# Patient Record
Sex: MALE | Race: BLACK OR AFRICAN AMERICAN | NOT HISPANIC OR LATINO | Employment: OTHER | URBAN - METROPOLITAN AREA
[De-identification: names, ages, dates, MRNs, and addresses within clinical notes are randomized per-mention and may not be internally consistent; named-entity substitution may affect disease eponyms.]

---

## 2017-03-27 RX ORDER — MELOXICAM 15 MG/1
TABLET ORAL
Qty: 90 TABLET | Refills: 0 | Status: SHIPPED | OUTPATIENT
Start: 2017-03-27 | End: 2017-06-25 | Stop reason: SDUPTHER

## 2017-06-25 RX ORDER — MELOXICAM 15 MG/1
TABLET ORAL
Qty: 90 TABLET | Refills: 0 | Status: SHIPPED | OUTPATIENT
Start: 2017-06-25 | End: 2017-09-07 | Stop reason: SDUPTHER

## 2017-09-07 RX ORDER — MELOXICAM 15 MG/1
TABLET ORAL
Qty: 90 TABLET | Refills: 0 | Status: SHIPPED | OUTPATIENT
Start: 2017-09-07 | End: 2017-11-17 | Stop reason: SDUPTHER

## 2017-09-07 NOTE — TELEPHONE ENCOUNTER
Please notify the patient that this will be the last refill until being seen in my office. Patient should call ASAP to make an appointement. Thank you.

## 2017-09-20 DIAGNOSIS — I10 UNSPECIFIED ESSENTIAL HYPERTENSION: ICD-10-CM

## 2017-09-20 RX ORDER — LISINOPRIL AND HYDROCHLOROTHIAZIDE 10; 12.5 MG/1; MG/1
TABLET ORAL
Qty: 90 TABLET | Refills: 0 | Status: SHIPPED | OUTPATIENT
Start: 2017-09-20 | End: 2018-01-04 | Stop reason: SDUPTHER

## 2017-10-30 ENCOUNTER — OFFICE VISIT (OUTPATIENT)
Dept: INTERNAL MEDICINE | Facility: CLINIC | Age: 67
End: 2017-10-30
Attending: INTERNAL MEDICINE
Payer: MEDICARE

## 2017-10-30 VITALS
OXYGEN SATURATION: 99 % | WEIGHT: 201 LBS | BODY MASS INDEX: 28.14 KG/M2 | SYSTOLIC BLOOD PRESSURE: 118 MMHG | DIASTOLIC BLOOD PRESSURE: 78 MMHG | HEART RATE: 57 BPM | HEIGHT: 71 IN

## 2017-10-30 DIAGNOSIS — Z13.31 SCREENING FOR DEPRESSION: ICD-10-CM

## 2017-10-30 DIAGNOSIS — G89.29 CHRONIC PAIN OF RIGHT ANKLE: ICD-10-CM

## 2017-10-30 DIAGNOSIS — Z13.39 SCREENING FOR ALCOHOLISM: ICD-10-CM

## 2017-10-30 DIAGNOSIS — Z00.00 ROUTINE HISTORY AND PHYSICAL EXAMINATION OF ADULT: Primary | ICD-10-CM

## 2017-10-30 DIAGNOSIS — R42 DIZZY SPELLS: ICD-10-CM

## 2017-10-30 DIAGNOSIS — Z23 NEED FOR VACCINATION AGAINST STREPTOCOCCUS PNEUMONIAE USING PNEUMOCOCCAL CONJUGATE VACCINE 7: ICD-10-CM

## 2017-10-30 DIAGNOSIS — Z23 INFLUENZA VACCINATION ADMINISTERED AT CURRENT VISIT: Primary | ICD-10-CM

## 2017-10-30 DIAGNOSIS — I10 ESSENTIAL HYPERTENSION: ICD-10-CM

## 2017-10-30 DIAGNOSIS — M25.571 CHRONIC PAIN OF RIGHT ANKLE: ICD-10-CM

## 2017-10-30 PROCEDURE — G0008 ADMIN INFLUENZA VIRUS VAC: HCPCS | Mod: S$GLB,,, | Performed by: INTERNAL MEDICINE

## 2017-10-30 PROCEDURE — 99214 OFFICE O/P EST MOD 30 MIN: CPT | Mod: 25,S$GLB,, | Performed by: INTERNAL MEDICINE

## 2017-10-30 PROCEDURE — G0009 ADMIN PNEUMOCOCCAL VACCINE: HCPCS | Mod: S$GLB,,, | Performed by: INTERNAL MEDICINE

## 2017-10-30 PROCEDURE — G0442 ANNUAL ALCOHOL SCREEN 15 MIN: HCPCS | Mod: 59,S$GLB,, | Performed by: INTERNAL MEDICINE

## 2017-10-30 PROCEDURE — 90686 IIV4 VACC NO PRSV 0.5 ML IM: CPT | Mod: S$GLB,,, | Performed by: INTERNAL MEDICINE

## 2017-10-30 PROCEDURE — G0444 DEPRESSION SCREEN ANNUAL: HCPCS | Mod: 59,S$GLB,, | Performed by: INTERNAL MEDICINE

## 2017-10-30 PROCEDURE — 90670 PCV13 VACCINE IM: CPT | Mod: S$GLB,,, | Performed by: INTERNAL MEDICINE

## 2017-10-30 NOTE — PROGRESS NOTES
Subjective:       Patient ID: Jerman Miguel is a 67 y.o. male.    Chief Complaint: Follow-up; Ankle Pain (right / cannot put weight on it / problems walking); and Dizziness    Right ankle hurts with weight bearing.  2 episodes of dizziness when 1st standing in the morning.      Ankle Pain    The incident occurred more than 1 week ago.   Dizziness:   Chronicity:  New  Onset:  1 to 4 weeks ago  Progression since onset:  Resolved    Review of Systems   Constitutional: Negative.    Respiratory: Negative.    Cardiovascular: Negative.    Musculoskeletal: Positive for arthralgias.   Neurological: Positive for dizziness.       Objective:      Physical Exam   Constitutional: He appears well-developed and well-nourished.   HENT:   Head: Normocephalic and atraumatic.   Eyes: Pupils are equal, round, and reactive to light.   Cardiovascular: Normal rate, regular rhythm and normal heart sounds.    Pulmonary/Chest: Effort normal.   Musculoskeletal: He exhibits no edema.        Right ankle: He exhibits decreased range of motion.   Neurological: He is alert.       Assessment:       1. Influenza vaccination administered at current visit    2. Need for vaccination against Streptococcus pneumoniae using pneumococcal conjugate vaccine 7    3. Chronic pain of right ankle    4. Dizzy spells        Plan:       Per orders and D/C instructions.  Continue meds/diet for HTN, which is stable.  Stay hydrated to prevent dizziness. Consider stopping HCTZ.  See Dr. Veliz for arthritis of right ankle.    Screening: The patient was screened for depression with the PHQ2 questionnaire and possible health consequences were discussed with the patient, who understands (15 minutes spent). The patient was screened for the misuse of alcohol, by asking the number of drinks per average week, and if pt has had more than 4 drinks (more than 3 for women and elderly) in 1 day within the past year. The health and legal consequences of misuse were  discussed (15 minutes spent). The patient was screened for obesity (BMI>30), If the current BMI > 30, then the possible consequences of obesity, as well as the benefits of diet, exercise, and weight loss were discussed (15 minutes spent).

## 2017-11-02 PROBLEM — E78.00 HIGH CHOLESTEROL: Status: ACTIVE | Noted: 2017-11-02

## 2017-11-02 LAB
25(OH)D3+25(OH)D2 SERPL-MCNC: 18 NG/ML (ref 30–100)
ALBUMIN SERPL-MCNC: 4.6 G/DL (ref 3.6–5.1)
ALBUMIN/GLOB SERPL: 1.8 (CALC) (ref 1–2.5)
ALP SERPL-CCNC: 55 U/L (ref 40–115)
ALT SERPL-CCNC: 13 U/L (ref 9–46)
AST SERPL-CCNC: 17 U/L (ref 10–35)
BASOPHILS # BLD AUTO: 71 CELLS/UL (ref 0–200)
BASOPHILS NFR BLD AUTO: 1 %
BILIRUB SERPL-MCNC: 0.7 MG/DL (ref 0.2–1.2)
BUN SERPL-MCNC: 17 MG/DL (ref 7–25)
BUN/CREAT SERPL: NORMAL (CALC) (ref 6–22)
CALCIUM SERPL-MCNC: 9.6 MG/DL (ref 8.6–10.3)
CHLORIDE SERPL-SCNC: 103 MMOL/L (ref 98–110)
CHOLEST SERPL-MCNC: 231 MG/DL
CHOLEST/HDLC SERPL: 5.1 (CALC)
CO2 SERPL-SCNC: 27 MMOL/L (ref 20–31)
CREAT SERPL-MCNC: 1.04 MG/DL (ref 0.7–1.25)
EOSINOPHIL # BLD AUTO: 121 CELLS/UL (ref 15–500)
EOSINOPHIL NFR BLD AUTO: 1.7 %
ERYTHROCYTE [DISTWIDTH] IN BLOOD BY AUTOMATED COUNT: 12.8 % (ref 11–15)
GFR SERPL CREATININE-BSD FRML MDRD: 73 ML/MIN/1.73M2
GLOBULIN SER CALC-MCNC: 2.6 G/DL (CALC) (ref 1.9–3.7)
GLUCOSE SERPL-MCNC: 83 MG/DL (ref 65–99)
HBA1C MFR BLD: 5.2 % OF TOTAL HGB
HCT VFR BLD AUTO: 42.8 % (ref 38.5–50)
HDLC SERPL-MCNC: 45 MG/DL
HGB BLD-MCNC: 14.1 G/DL (ref 13.2–17.1)
LDLC SERPL CALC-MCNC: 166 MG/DL (CALC)
LYMPHOCYTES # BLD AUTO: 1598 CELLS/UL (ref 850–3900)
LYMPHOCYTES NFR BLD AUTO: 22.5 %
MCH RBC QN AUTO: 29.1 PG (ref 27–33)
MCHC RBC AUTO-ENTMCNC: 32.9 G/DL (ref 32–36)
MCV RBC AUTO: 88.2 FL (ref 80–100)
MONOCYTES # BLD AUTO: 582 CELLS/UL (ref 200–950)
MONOCYTES NFR BLD AUTO: 8.2 %
NEUTROPHILS # BLD AUTO: 4729 CELLS/UL (ref 1500–7800)
NEUTROPHILS NFR BLD AUTO: 66.6 %
NONHDLC SERPL-MCNC: 186 MG/DL (CALC)
PLATELET # BLD AUTO: 196 THOUSAND/UL (ref 140–400)
PMV BLD REES-ECKER: 12.5 FL (ref 7.5–12.5)
POTASSIUM SERPL-SCNC: 4.7 MMOL/L (ref 3.5–5.3)
PROT SERPL-MCNC: 7.2 G/DL (ref 6.1–8.1)
PSA SERPL-MCNC: 0.8 NG/ML
RBC # BLD AUTO: 4.85 MILLION/UL (ref 4.2–5.8)
SODIUM SERPL-SCNC: 139 MMOL/L (ref 135–146)
TRIGL SERPL-MCNC: 91 MG/DL
TSH SERPL-ACNC: 0.66 MIU/L (ref 0.4–4.5)
VIT B12 SERPL-MCNC: 526 PG/ML (ref 200–1100)
VITAMIN D2 SERPL-MCNC: <4 NG/ML
VITAMIN D3 SERPL-MCNC: 18 NG/ML
WBC # BLD AUTO: 7.1 THOUSAND/UL (ref 3.8–10.8)

## 2017-11-18 RX ORDER — MELOXICAM 15 MG/1
TABLET ORAL
Qty: 90 TABLET | Refills: 1 | Status: SHIPPED | OUTPATIENT
Start: 2017-11-18 | End: 2018-05-15 | Stop reason: SDUPTHER

## 2018-01-04 DIAGNOSIS — I10 ESSENTIAL HYPERTENSION: ICD-10-CM

## 2018-01-04 RX ORDER — LISINOPRIL AND HYDROCHLOROTHIAZIDE 10; 12.5 MG/1; MG/1
TABLET ORAL
Qty: 90 TABLET | Refills: 3 | Status: SHIPPED | OUTPATIENT
Start: 2018-01-04 | End: 2018-12-27 | Stop reason: SDUPTHER

## 2018-01-11 ENCOUNTER — HOSPITAL ENCOUNTER (OUTPATIENT)
Dept: RADIOLOGY | Facility: OTHER | Age: 68
Discharge: HOME | End: 2018-01-11
Attending: INTERNAL MEDICINE
Payer: MEDICARE

## 2018-01-11 ENCOUNTER — OFFICE VISIT (OUTPATIENT)
Dept: INTERNAL MEDICINE | Facility: CLINIC | Age: 68
End: 2018-01-11
Attending: INTERNAL MEDICINE
Payer: MEDICARE

## 2018-01-11 VITALS
SYSTOLIC BLOOD PRESSURE: 112 MMHG | HEART RATE: 64 BPM | HEIGHT: 71 IN | WEIGHT: 200 LBS | OXYGEN SATURATION: 98 % | BODY MASS INDEX: 28 KG/M2 | DIASTOLIC BLOOD PRESSURE: 74 MMHG

## 2018-01-11 DIAGNOSIS — G89.29 CHRONIC PAIN OF RIGHT ANKLE: ICD-10-CM

## 2018-01-11 DIAGNOSIS — E78.00 HIGH CHOLESTEROL: ICD-10-CM

## 2018-01-11 DIAGNOSIS — I10 ESSENTIAL HYPERTENSION: Primary | ICD-10-CM

## 2018-01-11 DIAGNOSIS — M25.561 ACUTE PAIN OF RIGHT KNEE: ICD-10-CM

## 2018-01-11 DIAGNOSIS — M25.571 CHRONIC PAIN OF RIGHT ANKLE: ICD-10-CM

## 2018-01-11 PROCEDURE — 73562 X-RAY EXAM OF KNEE 3: CPT | Mod: TC,FY,RT

## 2018-01-11 PROCEDURE — 99214 OFFICE O/P EST MOD 30 MIN: CPT | Mod: S$GLB,,, | Performed by: INTERNAL MEDICINE

## 2018-01-11 PROCEDURE — 73562 X-RAY EXAM OF KNEE 3: CPT | Mod: 26,RT,, | Performed by: RADIOLOGY

## 2018-01-11 NOTE — PROGRESS NOTES
Subjective:       Patient ID: Jerman Miguel is a 67 y.o. male.    Chief Complaint: Knee Pain (started swelling Monday)    Seeing Dr. Veliz for ankle pain. Getting inserts. May need ankle replacement 1 day.  Sudden onset right knee pain 3 days ago. No trauma. Hard to walk.      Knee Pain    The incident occurred 3 to 5 days ago. There was no injury mechanism. The pain has been constant since onset.   Hypertension   This is a chronic problem. The problem is controlled.     Review of Systems   Constitutional: Negative.    Respiratory: Negative.    Cardiovascular: Negative.    Musculoskeletal: Positive for arthralgias.   Psychiatric/Behavioral: Negative for dysphoric mood.       Objective:      Physical Exam   Constitutional: He appears well-developed and well-nourished.   HENT:   Head: Normocephalic and atraumatic.   Eyes: Pupils are equal, round, and reactive to light.   Cardiovascular: Normal rate, regular rhythm and normal heart sounds.    Pulmonary/Chest: Effort normal.   Musculoskeletal: He exhibits no edema.        Right knee: He exhibits swelling.        Right ankle: He exhibits decreased range of motion.   Neurological: He is alert.       Assessment:       1. Essential hypertension    2. High cholesterol    3. Acute pain of right knee    4. Chronic pain of right ankle        Plan:       Per orders and D/C instructions.  Continue meds/diet for ankle DJD, HTN, and high cholest. which are stable.     RICE and knee brace for knee pain. Ortho if not better in a few weeks. Check x-ray.    Screening: The patient was screened for depression with the PHQ2 questionnaire and possible health consequences were discussed with the patient, who understands (15 minutes spent). The patient was screened for the misuse of alcohol, by asking the number of drinks per average week, and if pt has had more than 4 drinks (more than 3 for women and elderly) in 1 day within the past year. The health and legal consequences of  misuse were discussed (15 minutes spent). The patient was screened for obesity (BMI>30), If the current BMI > 30, then the possible consequences of obesity, as well as the benefits of diet, exercise, and weight loss were discussed (15 minutes spent).

## 2018-04-13 ENCOUNTER — OFFICE VISIT (OUTPATIENT)
Dept: URGENT CARE | Facility: CLINIC | Age: 68
End: 2018-04-13
Payer: MEDICARE

## 2018-04-13 VITALS
SYSTOLIC BLOOD PRESSURE: 135 MMHG | HEART RATE: 108 BPM | WEIGHT: 200 LBS | HEIGHT: 71 IN | TEMPERATURE: 99 F | BODY MASS INDEX: 28 KG/M2 | DIASTOLIC BLOOD PRESSURE: 88 MMHG | RESPIRATION RATE: 16 BRPM | OXYGEN SATURATION: 95 %

## 2018-04-13 DIAGNOSIS — S99.921A FOOT INJURY, RIGHT, INITIAL ENCOUNTER: ICD-10-CM

## 2018-04-13 DIAGNOSIS — S93.324A LISFRANC DISLOCATION, RIGHT, INITIAL ENCOUNTER: ICD-10-CM

## 2018-04-13 DIAGNOSIS — S92.321A CLOSED DISPLACED FRACTURE OF SECOND METATARSAL BONE OF RIGHT FOOT, INITIAL ENCOUNTER: Primary | ICD-10-CM

## 2018-04-13 PROCEDURE — 29515 APPLICATION SHORT LEG SPLINT: CPT | Mod: RT,S$GLB,, | Performed by: EMERGENCY MEDICINE

## 2018-04-13 PROCEDURE — 3075F SYST BP GE 130 - 139MM HG: CPT | Mod: CPTII,S$GLB,, | Performed by: EMERGENCY MEDICINE

## 2018-04-13 PROCEDURE — 3079F DIAST BP 80-89 MM HG: CPT | Mod: CPTII,S$GLB,, | Performed by: EMERGENCY MEDICINE

## 2018-04-13 PROCEDURE — 99203 OFFICE O/P NEW LOW 30 MIN: CPT | Mod: 25,S$GLB,, | Performed by: EMERGENCY MEDICINE

## 2018-04-13 RX ORDER — HYDROCODONE BITARTRATE AND ACETAMINOPHEN 7.5; 325 MG/1; MG/1
1 TABLET ORAL EVERY 6 HOURS PRN
Qty: 20 TABLET | Refills: 0 | Status: SHIPPED | OUTPATIENT
Start: 2018-04-13 | End: 2018-04-18

## 2018-04-13 NOTE — PATIENT INSTRUCTIONS
Understanding Lisfranc Joint Injury  A Lisfranc joint injury is a kind of injury to the bones or ligaments in the arch of your foot. There is often also damage to the cartilage that covers these bones. The injury gets its name from a [place] surgeon.  Parts of the arch of the foot  In the middle region of your foot, a group of 10 small bones forms an arch. Five of these long bones (metatarsals) lead to your toes. The group also includes the cuboid bone and the medial, middle, and lateral cuneiform bones. Tight connective tissue bands hold all of these bones in place and give the joint its stability. This area of your foot is important to keep your arch steady. It also moves the force from your calves to the front of your feet when you walk.  What causes a Lisfranc joint injury?  A twisting fall may break one or more of these bones or move them out of place. This causes a Lisfranc injury. Its also called a tarsometatarsal joint injury. There are different types of Lisfranc injuries. They depend on the direction of the shifted metatarsals and how badly they are shifted.  A Lisfranc joint injury may be the result of twisting and falling on a foot that is pointing down. This is common in football and soccer players. Lisfranc injuries can also happen from a car accident.  What are the symptoms of a Lisfranc joint injury?  A Lisfranc joint injury can cause symptoms such as:  · Pain in your midfoot  · Pain to the touch  · Swelling or deformity in the middle part of your foot  · Inability to put weight on your foot  · Bruising in the middle of your foot  How is a Lisfranc joint injury diagnosed?  It is important to have a Lisfranc joint injury diagnosed correctly. It has many of the same symptoms as an ankle sprain. But the treatment for an ankle sprain is very different. Your healthcare provider will ask about your health history. He or she will ask questions about your symptoms and recent accidents. He or she will also  check your foot, looking for pain, deformity, bruising, and swelling. Your healthcare provider may hold your toes and move them up and down to see if this causes pain.  You will likely have X-rays. The X-rays will be done at special angles, so they can show the injury. A Lisfranc joint injury may not show up on standard X-rays.  You may also need other imaging tests of your foot. These tests may show a Lisfranc joint injury that an X-ray may not. You may have tests such as:  · MRI scan. This uses strong magnets and a computer to make images of the body. It gives more information about damage to the soft tissues in your foot.  · CT scan. This uses a series of X-rays and a computer to make detailed images. A special kind of imaging dye may be used. The scan gives information about damage to your bones.  Date Last Reviewed: 4/1/2017  © 5647-7671 [employer]. [address] [place] [address]. All rights reserved. This information is not intended as a substitute for professional medical care. Always follow your healthcare professional's instructions.        Foot Fracture  You have a broken bone (fracture) in your foot. This will cause pain, swelling, and often bruising. It will usually take about 4 to 8 weeks to heal. A foot fracture may be treated with a special shoe, splint, cast, or boot.  Home care  Follow these guidelines when caring for yourself at home:  · You may be given a splint, cast, shoe, or boot to keep the injured area from moving. Unless you were told otherwise, use crutches or a walker. Dont put weight on the injured foot until your health care provider says you can do so. (You can rent crutches and a walker at many pharmacies and surgical or orthopedic supply stores.) Dont put weight on a splint, or it will break.  · Keep your leg elevated to reduce pain and swelling. When sleeping, put a pillow under the injured leg. When sitting, support the injured leg so it is above your  waist. This is very important during the first 2 days (48 hours).  · Put an ice pack on the injured area. Do this for 20 minutes every 1 to 2 hours the first day for pain relief. You can make an ice pack by wrapping a plastic bag of ice cubes in a thin towel. As the ice melts, be careful that the splint, cast, boot, or shoe doesnt get wet. You can place the ice pack directly over the splint or cast. Unless told otherwise, you can open the boot or shoe to apply the ice pack. Continue using the ice pack 3 to 4 times a day for the next 2 days. Then use the ice pack as needed to ease pain and swelling.  · Keep the splint, cast, boot, or shoe dry. When bathing, protect it with a large plastic bag, rubber-banded at the top end. If a fiberglass splint or cast or boot gets wet, you can dry it with a hair dryer. Unless told otherwise, you can take off the boot or shoe to bathe.  · You may use acetaminophen or ibuprofen to control pain, unless another pain medicine was prescribed. If you have chronic liver or kidney disease, talk with your healthcare provider before using these medicines. Also talk with your provider if youve had a stomach ulcer or gastrointestinal bleeding.  · Dont put creams or objects under the cast if you have itching.  Follow-up care  Follow up with your healthcare provider, or as advised. This is to make sure the bone is healing the way it should. If you were given a splint, it may be changed to a cast or boot at your follow-up visit.  X-rays may be taken. You will be told of any new findings that may affect your care.  When to seek medical advice  Call your healthcare provider right away if any of these occur:  · The cast or splint cracks  · The plaster cast or splint becomes wet or soft  · The fiberglass cast or splint stays wet for more than 24 hours  · Bad odor from the cast or wound fluid stains the cast  · Tightness or pain under the cast or splint gets worse  · Toes become swollen, cold, blue,  numb, or tingly  · You cant move your toes  · Skin around cast or splint becomes red  · Fever of 100.4ºF (38ºC) or higher, or as directed by your healthcare provider  Date Last Reviewed: 2/1/2017 © 2000-2017 [employer]. [address] [place] [address]. All rights reserved. This information is not intended as a substitute for professional medical care. Always follow your healthcare professional's instructions.        Treatment for Lisfranc Joint Injury   A Lisfranc joint injury is a kind of injury to the bones or ligaments in the arch of your foot. There is often also damage to the cartilage that covers these bones. The injury gets its name from a [place] surgeon.  Types of treatment   Your treatment may vary depending on how severe of your injury is. Your treatment may include:  · Pain medicine  · Wearing a nonweight-bearing cast for 6 weeks  · Wearing a weight-bearing cast or a special foot support after the first 6 weeks  · Serial X-rays to see how your foot is healing  It is important not to put weight on your foot during the initial healing period.  In some cases, you may need surgery. You may need surgery if you:  · Have broken bones  · Your bones are not lined up correctly  · Your ligaments are completely torn  The types of surgery include:  · Open reduction and internal fixation (ORIF). This is the most common type of surgery for the injury. The bones are lined up correctly. Injured ligaments are repaired. Special metal plates and screws hold the bones in place. These may be removed at later date.  · Joint fusion. This type of surgery is only done if the damage is very severe and cant be repaired. This surgery fuses one or more of your bones together. They then heal into a single, solid piece.  After surgery, you need to use a cast for several weeks. You will not be able to put weight on your foot.  Possible complications of a Lisfranc joint injury  A Lisfranc joint injury can cause  arthritis in the injured bones of your foot. This can lead to chronic pain in the area. You are more likely to develop arthritis if you had a severe Lisfranc joint injury that damaged a lot of the cartilage. Arthritis may occur even if your surgery worked well. Some people need to have joint fusion surgery to relieve these symptoms if the arthritis is severe.  There is also a risk that your bones will not heal properly. This may require a follow-up surgery. These risks may be higher if you smoke or have thinned bones (osteoporosis).  When to call your healthcare provider  Call your healthcare provider right away if you have any of these:  · Fever of 100.4°F (38°C) or higher  · Pain that gets worse  · Numbness in your foot   Date Last Reviewed: 8/10/2015  © 0062-2031 [email or web address]. [address] [place] [address], [place] [address]. All rights reserved. This information is not intended as a substitute for professional medical care. Always follow your healthcare professional's instructions.      URGENT REFERRAL TO ORTHOPEDICS DONE FROM CLINIC AND THEY WILL CONTACT YOU FOR APPOINTMENT.    MOBIC OR MOTRIN FOR PAIN/INFLAMMATION  [facility]CO RX FOR SEVERE PAIN  ELEVATE FOOT AND LEG WHEN POSSIBLE  NON-WEIGHT BEARING AT ALL UNTIL SEEN AND FURTHER CASTING OR OTHER RECS GIVEN  CRUTCHES AND POSTERIOR SPLINT AND NONWEIGHTBEARING UNTIL ORTHOPEDICS/BONE AND JOINT DOCTOR APPOINTMENT WHICH IS VERY IMPORTANT WITH THIS TYPE OF INJURY.    RETURN FOR ANY CONCERNS OR PROBLEMS  GO TO THE ER IF FOOT/TOES CHANGING COLORS, GOING NUMB, OR PAIN SEVERE DESPITE MEDS.

## 2018-04-13 NOTE — PROGRESS NOTES
"Subjective:       Patient ID: Jerman Miguel is a 67 y.o. male.    Vitals:    04/13/18 1648   BP: 135/88   Pulse: 108   Resp: 16   Temp: 98.5 °F (36.9 °C)   SpO2: 95%   Weight: 90.7 kg (200 lb)   Height: 5' 11" (1.803 m)       Chief Complaint: Foot Pain    Pt states he fell off a ladder this afternoon. Pt estimates he fell about 7 feet landing on his right foot. Right foot is painful and swollen. HAVING SEVERE PAIN AND SWELLING TO THE RIGHT FOOT AND UNABLE TO BEAR WEIGHT./ PAIN LOCATED DORSAL MIDFOOT. NO ANKLE PAIN, NO KNEE PAIN. NO PRIOR FRACTURE/INJURY TO THAT FOOT PER PATIENT. HE WAS HELPING A FRIEND IN CONSTRUCTION.      Foot Pain   This is a new problem. The current episode started today. The problem occurs constantly. The problem has been unchanged. Associated symptoms include joint swelling. Pertinent negatives include no abdominal pain, chest pain, chills, fever, headaches, nausea, rash, sore throat or vomiting. The symptoms are aggravated by walking. He has tried ice for the symptoms.     Review of Systems   Constitution: Negative for chills and fever.   HENT: Negative for sore throat.    Eyes: Negative for blurred vision.   Cardiovascular: Negative for chest pain.   Respiratory: Negative for shortness of breath.    Skin: Negative for rash.   Musculoskeletal: Positive for joint pain and joint swelling. Negative for back pain.   Gastrointestinal: Negative for abdominal pain, diarrhea, nausea and vomiting.   Neurological: Negative for headaches.   Psychiatric/Behavioral: The patient is not nervous/anxious.        Objective:      Physical Exam   Constitutional: He is oriented to person, place, and time. He appears well-developed and well-nourished. He is cooperative.  Non-toxic appearance. He does not appear ill. No distress.   HENT:   Head: Normocephalic and atraumatic. Head is without abrasion, without contusion and without laceration.   Right Ear: Hearing, tympanic membrane, external ear and ear canal " normal. No hemotympanum.   Left Ear: Hearing, tympanic membrane, external ear and ear canal normal. No hemotympanum.   Nose: No mucosal edema, rhinorrhea or nasal deformity. No epistaxis. Right sinus exhibits no maxillary sinus tenderness and no frontal sinus tenderness. Left sinus exhibits no maxillary sinus tenderness and no frontal sinus tenderness.   Mouth/Throat: Uvula is midline and mucous membranes are normal. No trismus in the jaw. Normal dentition. No uvula swelling. No posterior oropharyngeal erythema.   Eyes: Conjunctivae, EOM and lids are normal. Pupils are equal, round, and reactive to light. Right eye exhibits no discharge. Left eye exhibits no discharge.   Sclera clear bilat   Neck: Trachea normal, normal range of motion, full passive range of motion without pain and phonation normal. Neck supple. No spinous process tenderness and no muscular tenderness present. No neck rigidity. No tracheal deviation present.   Cardiovascular: Normal rate, regular rhythm, normal heart sounds, intact distal pulses and normal pulses.    Pulmonary/Chest: Effort normal and breath sounds normal. No respiratory distress.   Abdominal: Soft. Normal appearance and bowel sounds are normal. He exhibits no pulsatile midline mass.   Musculoskeletal: He exhibits edema and tenderness (SEVERE TTP OF THE MIDFOOT DORSALLY BY THE MID/BASE OF THE 2/3 METATARSAL). He exhibits no deformity.   IS DISTALLY NV INTACT   Neurological: He is alert and oriented to person, place, and time. He has normal strength. No sensory deficit. He exhibits normal muscle tone. He displays no seizure activity. GCS eye subscore is 4. GCS verbal subscore is 5. GCS motor subscore is 6.   Skin: Skin is warm, dry and intact. Capillary refill takes less than 2 seconds. No abrasion, no bruising, no burn, no ecchymosis and no laceration noted. He is not diaphoretic. No pallor.   Psychiatric: He has a normal mood and affect. His speech is normal and behavior is normal.  "Cognition and memory are normal.   Nursing note and vitals reviewed.      X-ray Foot Complete Right    Result Date: 4/13/2018  EXAMINATION: XR FOOT COMPLETE 3 VIEW RIGHT CLINICAL HISTORY: . Unspecified injury of right foot, initial encounter TECHNIQUE: AP, lateral, and oblique views of the right foot were performed. COMPARISON: None FINDINGS: There is approximately 5 mm lateral displacement of the 2nd metatarsal consistent with Lisfranc injury, uncertain if this represents acute or chronic injury.  Otherwise no acute fracture or dislocation seen.  Prominent degenerative changes are visualized involving the hindfoot and ankle.  There is significant soft tissue swelling seen over the dorsum of the foot.  No radiopaque retained foreign body seen.     Acute versus chronic Lisfranc injury.  Correlate with mechanism of injury.  Orthopedic follow-up could be obtained. Electronically signed by: Leslye Washington MD Date:    04/13/2018 Time:    17:29        RIGHT POSTERIOR SPLINT PLACED SO NONWEIGHT BEARING FOLLOWED. NV INTACT PRIOR TO THE PROCEDURE AND NV INTACT AFTER PLACEMENT. FITS WELL. PLASTER AND CAST PADDING AND ACE WRAP USED. 5", 4", 4" RESPECTIVELY. NO COMPLICATION AND FITS/TOLERATED WELL. CRUTCHES ATTEMPTED AND SUCCESSFUL. HE WILL WEAR THIS UNTIL HE SEES ORTHOPEDICS.  Assessment:       1. Closed displaced fracture of second metatarsal bone of right foot, initial encounter    2. Foot injury, right, initial encounter    3. Lisfranc dislocation, right, initial encounter        Plan:       Jerman was seen today for foot pain.    Diagnoses and all orders for this visit:    Closed displaced fracture of second metatarsal bone of right foot, initial encounter  -     Ambulatory referral to Orthopedics    Foot injury, right, initial encounter  -     X-Ray Foot Complete Right; Future    Lisfranc dislocation, right, initial encounter  -     Ambulatory referral to Orthopedics    Other orders  -     hydrocodone-acetaminophen " 7.5-325mg (NORCO) 7.5-325 mg per tablet; Take 1 tablet by mouth every 6 (six) hours as needed for Pain.          Patient Instructions       Understanding Lisfranc Joint Injury  A Lisfranc joint injury is a kind of injury to the bones or ligaments in the arch of your foot. There is often also damage to the cartilage that covers these bones. The injury gets its name from a French surgeon.  Parts of the arch of the foot  In the middle region of your foot, a group of 10 small bones forms an arch. Five of these long bones (metatarsals) lead to your toes. The group also includes the cuboid bone and the medial, middle, and lateral cuneiform bones. Tight connective tissue bands hold all of these bones in place and give the joint its stability. This area of your foot is important to keep your arch steady. It also moves the force from your calves to the front of your feet when you walk.  What causes a Lisfranc joint injury?  A twisting fall may break one or more of these bones or move them out of place. This causes a Lisfranc injury. Its also called a tarsometatarsal joint injury. There are different types of Lisfranc injuries. They depend on the direction of the shifted metatarsals and how badly they are shifted.  A Lisfranc joint injury may be the result of twisting and falling on a foot that is pointing down. This is common in football and soccer players. Lisfranc injuries can also happen from a car accident.  What are the symptoms of a Lisfranc joint injury?  A Lisfranc joint injury can cause symptoms such as:  · Pain in your midfoot  · Pain to the touch  · Swelling or deformity in the middle part of your foot  · Inability to put weight on your foot  · Bruising in the middle of your foot  How is a Lisfranc joint injury diagnosed?  It is important to have a Lisfranc joint injury diagnosed correctly. It has many of the same symptoms as an ankle sprain. But the treatment for an ankle sprain is very different. Your  healthcare provider will ask about your health history. He or she will ask questions about your symptoms and recent accidents. He or she will also check your foot, looking for pain, deformity, bruising, and swelling. Your healthcare provider may hold your toes and move them up and down to see if this causes pain.  You will likely have X-rays. The X-rays will be done at special angles, so they can show the injury. A Lisfranc joint injury may not show up on standard X-rays.  You may also need other imaging tests of your foot. These tests may show a Lisfranc joint injury that an X-ray may not. You may have tests such as:  · MRI scan. This uses strong magnets and a computer to make images of the body. It gives more information about damage to the soft tissues in your foot.  · CT scan. This uses a series of X-rays and a computer to make detailed images. A special kind of imaging dye may be used. The scan gives information about damage to your bones.  Date Last Reviewed: 4/1/2017 © 2000-2017 [employer]. [address] [place] [id number]. All rights reserved. This information is not intended as a substitute for professional medical care. Always follow your healthcare professional's instructions.        Foot Fracture  You have a broken bone (fracture) in your foot. This will cause pain, swelling, and often bruising. It will usually take about 4 to 8 weeks to heal. A foot fracture may be treated with a special shoe, splint, cast, or boot.  Home care  Follow these guidelines when caring for yourself at home:  · You may be given a splint, cast, shoe, or boot to keep the injured area from moving. Unless you were told otherwise, use crutches or a walker. Dont put weight on the injured foot until your health care provider says you can do so. (You can rent crutches and a walker at many pharmacies and surgical or orthopedic supply stores.) Dont put weight on a splint, or it will break.  · Keep your leg  elevated to reduce pain and swelling. When sleeping, put a pillow under the injured leg. When sitting, support the injured leg so it is above your waist. This is very important during the first 2 days (48 hours).  · Put an ice pack on the injured area. Do this for 20 minutes every 1 to 2 hours the first day for pain relief. You can make an ice pack by wrapping a plastic bag of ice cubes in a thin towel. As the ice melts, be careful that the splint, cast, boot, or shoe doesnt get wet. You can place the ice pack directly over the splint or cast. Unless told otherwise, you can open the boot or shoe to apply the ice pack. Continue using the ice pack 3 to 4 times a day for the next 2 days. Then use the ice pack as needed to ease pain and swelling.  · Keep the splint, cast, boot, or shoe dry. When bathing, protect it with a large plastic bag, rubber-banded at the top end. If a fiberglass splint or cast or boot gets wet, you can dry it with a hair dryer. Unless told otherwise, you can take off the boot or shoe to bathe.  · You may use acetaminophen or ibuprofen to control pain, unless another pain medicine was prescribed. If you have chronic liver or kidney disease, talk with your healthcare provider before using these medicines. Also talk with your provider if youve had a stomach ulcer or gastrointestinal bleeding.  · Dont put creams or objects under the cast if you have itching.  Follow-up care  Follow up with your healthcare provider, or as advised. This is to make sure the bone is healing the way it should. If you were given a splint, it may be changed to a cast or boot at your follow-up visit.  X-rays may be taken. You will be told of any new findings that may affect your care.  When to seek medical advice  Call your healthcare provider right away if any of these occur:  · The cast or splint cracks  · The plaster cast or splint becomes wet or soft  · The fiberglass cast or splint stays wet for more than 24  hours  · Bad odor from the cast or wound fluid stains the cast  · Tightness or pain under the cast or splint gets worse  · Toes become swollen, cold, blue, numb, or tingly  · You cant move your toes  · Skin around cast or splint becomes red  · Fever of 100.4ºF (38ºC) or higher, or as directed by your healthcare provider  Date Last Reviewed: 2/1/2017 © 2000-2017 [employer]. [address] [place] [address]. All rights reserved. This information is not intended as a substitute for professional medical care. Always follow your healthcare professional's instructions.        Treatment for Lisfranc Joint Injury   A Lisfranc joint injury is a kind of injury to the bones or ligaments in the arch of your foot. There is often also damage to the cartilage that covers these bones. The injury gets its name from a Yi surgeon.  Types of treatment   Your treatment may vary depending on how severe of your injury is. Your treatment may include:  · Pain medicine  · Wearing a nonweight-bearing cast for 6 weeks  · Wearing a weight-bearing cast or a special foot support after the first 6 weeks  · Serial X-rays to see how your foot is healing  It is important not to put weight on your foot during the initial healing period.  In some cases, you may need surgery. You may need surgery if you:  · Have broken bones  · Your bones are not lined up correctly  · Your ligaments are completely torn  The types of surgery include:  · Open reduction and internal fixation (ORIF). This is the most common type of surgery for the injury. The bones are lined up correctly. Injured ligaments are repaired. Special metal plates and screws hold the bones in place. These may be removed at later date.  · Joint fusion. This type of surgery is only done if the damage is very severe and cant be repaired. This surgery fuses one or more of your bones together. They then heal into a single, solid piece.  After surgery, you need to use a  cast for several weeks. You will not be able to put weight on your foot.  Possible complications of a Lisfranc joint injury  A Lisfranc joint injury can cause arthritis in the injured bones of your foot. This can lead to chronic pain in the area. You are more likely to develop arthritis if you had a severe Lisfranc joint injury that damaged a lot of the cartilage. Arthritis may occur even if your surgery worked well. Some people need to have joint fusion surgery to relieve these symptoms if the arthritis is severe.  There is also a risk that your bones will not heal properly. This may require a follow-up surgery. These risks may be higher if you smoke or have thinned bones (osteoporosis).  When to call your healthcare provider  Call your healthcare provider right away if you have any of these:  · Fever of 100.4°F (38°C) or higher  · Pain that gets worse  · Numbness in your foot   Date Last Reviewed: 8/10/2015  © 1901-7156 [employer]. [address] [place] [address]. All rights reserved. This information is not intended as a substitute for professional medical care. Always follow your healthcare professional's instructions.      URGENT REFERRAL TO ORTHOPEDICS DONE FROM CLINIC AND THEY WILL CONTACT YOU FOR APPOINTMENT.    MOBIC OR MOTRIN FOR PAIN/INFLAMMATION  NORCO RX FOR SEVERE PAIN  ELEVATE FOOT AND LEG WHEN POSSIBLE  NON-WEIGHT BEARING AT ALL UNTIL SEEN AND FURTHER CASTING OR OTHER RECS GIVEN  CRUTCHES AND POSTERIOR SPLINT AND NONWEIGHTBEARING UNTIL ORTHOPEDICS/BONE AND JOINT DOCTOR APPOINTMENT WHICH IS VERY IMPORTANT WITH THIS TYPE OF INJURY.    RETURN FOR ANY CONCERNS OR PROBLEMS  GO TO THE ER IF FOOT/TOES CHANGING COLORS, GOING NUMB, OR PAIN SEVERE DESPITE MEDS.

## 2018-04-30 ENCOUNTER — OFFICE VISIT (OUTPATIENT)
Dept: INTERNAL MEDICINE | Facility: CLINIC | Age: 68
End: 2018-04-30
Attending: INTERNAL MEDICINE
Payer: MEDICARE

## 2018-04-30 VITALS
DIASTOLIC BLOOD PRESSURE: 80 MMHG | HEIGHT: 71 IN | HEART RATE: 89 BPM | BODY MASS INDEX: 28.56 KG/M2 | OXYGEN SATURATION: 99 % | WEIGHT: 204 LBS | SYSTOLIC BLOOD PRESSURE: 124 MMHG

## 2018-04-30 DIAGNOSIS — E78.00 HIGH CHOLESTEROL: ICD-10-CM

## 2018-04-30 DIAGNOSIS — I10 ESSENTIAL HYPERTENSION: Primary | ICD-10-CM

## 2018-04-30 DIAGNOSIS — Z00.00 ROUTINE ADULT HEALTH MAINTENANCE: ICD-10-CM

## 2018-04-30 DIAGNOSIS — Z13.39 SCREENING FOR ALCOHOLISM: ICD-10-CM

## 2018-04-30 DIAGNOSIS — S92.334D CLOSED NONDISPLACED FRACTURE OF THIRD METATARSAL BONE OF RIGHT FOOT WITH ROUTINE HEALING, SUBSEQUENT ENCOUNTER: ICD-10-CM

## 2018-04-30 DIAGNOSIS — Z13.31 SCREENING FOR DEPRESSION: ICD-10-CM

## 2018-04-30 PROCEDURE — 3079F DIAST BP 80-89 MM HG: CPT | Mod: CPTII,S$GLB,, | Performed by: INTERNAL MEDICINE

## 2018-04-30 PROCEDURE — G0444 DEPRESSION SCREEN ANNUAL: HCPCS | Mod: 59,S$GLB,, | Performed by: INTERNAL MEDICINE

## 2018-04-30 PROCEDURE — 1159F MED LIST DOCD IN RCRD: CPT | Mod: S$GLB,,, | Performed by: INTERNAL MEDICINE

## 2018-04-30 PROCEDURE — G0442 ANNUAL ALCOHOL SCREEN 15 MIN: HCPCS | Mod: 59,S$GLB,, | Performed by: INTERNAL MEDICINE

## 2018-04-30 PROCEDURE — 1160F RVW MEDS BY RX/DR IN RCRD: CPT | Mod: S$GLB,,, | Performed by: INTERNAL MEDICINE

## 2018-04-30 PROCEDURE — 99214 OFFICE O/P EST MOD 30 MIN: CPT | Mod: 25,S$GLB,, | Performed by: INTERNAL MEDICINE

## 2018-04-30 PROCEDURE — 3074F SYST BP LT 130 MM HG: CPT | Mod: CPTII,S$GLB,, | Performed by: INTERNAL MEDICINE

## 2018-04-30 PROCEDURE — 3008F BODY MASS INDEX DOCD: CPT | Mod: CPTII,S$GLB,, | Performed by: INTERNAL MEDICINE

## 2018-04-30 RX ORDER — VIT C/E/ZN/COPPR/LUTEIN/ZEAXAN 250MG-90MG
2000 CAPSULE ORAL DAILY
COMMUNITY

## 2018-04-30 NOTE — PROGRESS NOTES
Subjective:       Patient ID: Jerman Miguel is a 67 y.o. male.    Chief Complaint: Follow-up    Fell off a latter and Fx right 3rd and 4th metatarsals.      Hypertension   This is a chronic problem. The current episode started more than 1 year ago. The problem is controlled.     Review of Systems   Constitutional: Negative.    Respiratory: Negative.    Cardiovascular: Negative.    Psychiatric/Behavioral: Negative for dysphoric mood.       Objective:      Physical Exam   Constitutional: He appears well-developed and well-nourished.   HENT:   Head: Normocephalic and atraumatic.   Eyes: Pupils are equal, round, and reactive to light.   Cardiovascular: Normal rate, regular rhythm and normal heart sounds.    Pulmonary/Chest: Effort normal.   Musculoskeletal: He exhibits no edema.        Right knee: He exhibits swelling.        Right ankle: He exhibits decreased range of motion.   Right foot wrapped in ACE.   Neurological: He is alert.       Assessment:       1. Essential hypertension    2. High cholesterol    3. Routine adult health maintenance    4. Screening for depression    5. Screening for alcoholism    6. Closed nondisplaced fracture of third metatarsal bone of right foot with routine healing, subsequent encounter        Plan:       Per orders and D/C instructions.  Continue meds/diet for HTN, and high cholest. which are stable.     F/u with Dr. Veliz for metatarsal Fx.    Screening: The patient was screened for depression with the PHQ2 questionnaire and possible health consequences were discussed with the patient, who understands (15 minutes spent). The patient was screened for the misuse of alcohol, by asking the number of drinks per average week, and if pt has had more than 4 drinks (more than 3 for women and elderly) in 1 day within the past year. The health and legal consequences of misuse were discussed (15 minutes spent). The patient was screened for obesity (BMI>30), If the current BMI > 30, then  the possible consequences of obesity, as well as the benefits of diet, exercise, and weight loss were discussed (15 minutes spent).

## 2018-04-30 NOTE — PATIENT INSTRUCTIONS
Low-Fat Diet    A low-fat diet will help you lose weight. It also can lower cholesterol and prevent symptoms of gallbladder disease. The average American diet contains up to 50% fat. This means that 50% of all calories come from fat (about 80 grams to 100 grams of fat per day). Choosing normal portions of foods from the list below can help lower your fat intake. Experts recommend that only 20% to 35% of your daily calories come from fat. The remaining 65% to 80% of calories will come from protein and carbohydrates. This is much healthier for you.  Breads  Ok: Whole-wheat or rye bread, giovanny or soda crackers, nicole toast, plain rolls, bagels, English muffins  Avoid: Rolls and breads containing whole milk or egg; waffles, pancakes, biscuits, corn bread; cheese crackers, other flavored crackers, pastries, doughnuts  Cereals  Ok: Oatmeal, whole-wheat, bran, multigrain, rice  Avoid: Granola or other cereals that have oil, coconut, or more than 2 grams of fat per serving  Cheese and eggs  Ok: Cheeses labeled low-fat; 3 whole eggs per week; egg whites and egg substitutes as desired  Avoid: All other cheeses  Desserts  Ok: Gelatin, slushy, desi food cake, meringues, nonfat yogurt, and puddings or sherbet made with nonfat milk  Avoid: Any other store-bought desserts, or desserts that have fat, whole milk, cream, chocolate, and coconut  Drinks  Ok: Nonfat milk, coffee, tea, fizzy (carbonated) drinks  Avoid: Whole and reduced-fat milk, evaporated and condensed milk, hot chocolate mixes, milk shakes, malts, eggnog  Fats  Ok: You may have up to 3 teaspoons of fat daily. This can be butter, margarine, mayonnaise, or healthy oils (canola or olive)  Avoid: Cream, nondairy creams, cream cheese, gravies, and cream sauces  Fruits  Ok: All fruits made without fat  Avoid: Coconut, olives  Meats, poultry, fish  Ok: Limit meat to 6 ounces daily (broiled, roasted, baked, grilled, or boiled). Buy lean cuts, and trim off the fat. Try  beef, fish, lamb, pork, and canned fish packed in water; also chicken and turkey with the skin removed.  Avoid: Fried meats, fish, or poultry; fried eggs, and fish canned in oils; fatty meats such as mayorga, sausage, corned beef, hot dogs, and lunch meats; meats with gravies and sauces  Potatoes, beans, pasta  Ok: Dried beans, split peas, lentils, potatoes, rice, pasta made without added fat  Avoid: French fries, potato chips, potatoes prepared with butter, refried beans  Soups  Ok: Clear broth soups without fat and with allowed vegetables  Avoid: Cream-based soups  Vegetables  Ok: Fresh, frozen, canned or dried vegetables, all made without added fat  Avoid: Fried vegetables and those prepared with butter, cream, sauces  Miscellaneous  Ok: Salt, sugar, jelly, hard candy, marshmallows, honey, syrup, spices and herbs, mustard, ketchup, lemon, and vinegar. Try to limit sweets and added sugars.  Avoid: Chocolate, nuts, coconut, and cream candies; sunflower, sesame, and other seeds; fried foods; cream sauces and gravies; pizza  Date Last Reviewed: 8/1/2016 © 2000-2017 [employer]. [address] [place] [id number]. All rights reserved. This information is not intended as a substitute for professional medical care. Always follow your healthcare professional's instructions.

## 2018-05-15 DIAGNOSIS — R52 PAIN: Primary | ICD-10-CM

## 2018-05-15 RX ORDER — MELOXICAM 15 MG/1
TABLET ORAL
Qty: 90 TABLET | Refills: 0 | Status: SHIPPED | OUTPATIENT
Start: 2018-05-15 | End: 2019-04-29 | Stop reason: SDUPTHER

## 2018-07-05 DIAGNOSIS — S93.324D DISLOCATION OF TARSOMETATARSAL JOINT OF RIGHT FOOT, SUBSEQUENT ENCOUNTER: Primary | ICD-10-CM

## 2018-07-12 NOTE — PROGRESS NOTES
The following patient has been discharged.  Please complete the discharge summary for this patient.

## 2018-07-13 ENCOUNTER — CLINICAL SUPPORT (OUTPATIENT)
Dept: REHABILITATION | Facility: HOSPITAL | Age: 68
End: 2018-07-13
Payer: MEDICARE

## 2018-07-13 DIAGNOSIS — M25.673 DECREASED ROM OF ANKLE: ICD-10-CM

## 2018-07-13 DIAGNOSIS — M79.671 RIGHT FOOT PAIN: ICD-10-CM

## 2018-07-13 PROCEDURE — 97110 THERAPEUTIC EXERCISES: CPT | Mod: PO

## 2018-07-13 PROCEDURE — G8978 MOBILITY CURRENT STATUS: HCPCS | Mod: CL,PO

## 2018-07-13 PROCEDURE — G8979 MOBILITY GOAL STATUS: HCPCS | Mod: CK,PO

## 2018-07-13 PROCEDURE — 97161 PT EVAL LOW COMPLEX 20 MIN: CPT | Mod: PO

## 2018-07-13 NOTE — PLAN OF CARE
[facility]  Physical Therapy Initial Evaluation    Name: Jerman Miguel  Clinic Number: [id number]    Therapy Diagnosis:   Encounter Diagnoses   Name Primary?    Right foot pain     Decreased ROM of ankle      Physician: Fan Veliz MD    Physician Orders: PT Eval and Treat/ gait training  Medical Diagnosis from Referral: Dislocation of tarsometatarsal joints of R foot w/ fracture   Evaluation Date: 7/13/2018  Authorization Period Expiration: 6/5/18  Plan of Care Expiration: 9/13/18  Visit # / Visits authorized: 1/ 1    Time In: 9:00AM  Time Out: 10:00AM  Total Billable Time: 60 minutes    Precautions: Weightbearing as tolerated for 3 weeks, ween from the boot at the 3 week cindy (as of 6/19/18)    Subjective   Date of onset: 4/13/18   History of current condition - Jerman reports: on 4/13/18 he was on a ladder assisting his brother with a house project when he slipped off the ladder and fell about 8 ft and landed on his R foot. Pt reports he went to urgent care at which time it was determined conservative treatment would be sufficient, however after getting a second opinion from Dr. Boateng pt opted to undergo surgery and received an ORIF in his R foot on 5/14/17t. Pt had received orders for PT however did not get insurance approval until recently. Pt has been ambulating with walking boot and crutches and using a WC for longer distances or when experiencing pain. Pt reports difficulty driving, picking up objects, navigating stairs, and standing for extended periods.     Past Medical History:   Diagnosis Date    Arthritis of right foot 10/5/2016    HTN (hypertension) 12/2/2014 2014     Jerman Miguel  has a past surgical history that includes Spine surgery (1995) and Right elbow (1988).    Jerman has a current medication list which includes the following prescription(s): cholecalciferol (vitamin d3), lisinopril-hydrochlorothiazide, and meloxicam.    Review of  patient's allergies indicates:  No Known Allergies     Imaging, x-ray positive for fx of 2-3rd metatarsal fx and tarsometatarsal dislocation:     Prior Therapy: None  Social History: Pt lives on a two story house, his bed, kitchen and bathroom are all on first floor lives with their spouse and lives with their daughter  Occupation: Retired   Prior Level of Function: Pt was independent with all ADLs  Current Level of Function: Pt has been ambulating WBAT in walking boot with bilateral axillary crutches and using WC for long distances and when experiencing increased pain    Pain:  Current 6/10, worst 7/10, best 3/10   Location: right feet   Description: Aching, Deep and Sharp  Aggravating Factors: Sitting, Night Time and Getting out of bed/chair, picking objects off floor in standing  Easing Factors: relaxation, pain medication, ice, rest and elevation    Pts goals: Pt would like to be able to walk without assistive device, navigate stairs, pick objects up of the floor and regain strength and mobility in his R foot    Objective     Ankle Range of Motion: AROM (PROM)  Ankle Left Right   Dorsiflexion 0  5    Plantarflexion 50  23    Inversion 30  25    Eversion 10  15     1st MTPext     38        20  Strength:  Ankle Left Right   Dorsiflexion 5 4+   Plantarflexion 5 NT   Inversion 5 4-   Eversion 5 4     Joint play:    Talocrural joint:  R = 2/6   L= 3/6  Subtalar joint:  R= 2/6   L = 3/6  *Significant crepitus bilat during joint play assessment; pt reports no pain but feels stretch in joint at end range    Edema:    Figure 8:  R=58 cm L=58cm  Transmalleolar:  R=31cm L= 30.5cm    Integumentary    2 scars on R foot showing good healing no draining or signs of infection  1. Between 2nd-3rd met = 5cm  2. Medial aspect of 1st met = 2.5cm    CMS Impairment/Limitation/Restriction for FOTO Foot Survey    Therapist reviewed FOTO scores for Jerman Miguel on 7/13/2018.   FOTO documents entered into EPIC - see Media  section.    Limitation Score: 63%  Category: Mobility    Current : CL = least 60% but < 80% impaired, limited or restricted  Goal: CK = at least 40% but < 60% impaired, limited or restricted  Discharge: TBD         TREATMENT   Treatment Time In: 9:00AM  Treatment Time Out: 10:00AM  Total Treatment time separate from Evaluation: 25 minutes    Jerman received therapeutic exercises to develop strength, endurance, ROM and flexibility for 15 minutes including:    R ankle only    OTB inversion 2x20  OTB eversion 2x20  OTB plantar flexion 2x20  Strap gastroc stretch: 2x30 sec  AROM alphabet 2 rounds    Jerman received the following manual therapy techniques: Joint mobilizations were applied to the: R ankle for 5 minutes, including:    Posterior TC glide Gr II-III  Anterior TC glide Gr II-III  TC distraction Gr III-IIII  Subtalar supination/ pronation mobilization Gr II-III  Plantar/dorsal glides to metatarsal 2-4 Gr II-III  Plantar glide for 1st MTP extension Gr II-III    Jerman participated in gait training to improve functional mobility and safety for 5  minutes, including:    Sequencing with bilat axillary crutches during gait cycle. Adjusted crutches to decrease risk of axillary neurovascular impingement    Jerman received cold pack for 15 minutes to R ankle.    Home Exercises and Patient Education Provided    Education provided re: assistive device use, weight bearing precautions, pain/edema management, role of PT    Written Home Exercises Provided: Yes; exercises performed today printed and given to pt.  Exercises were reviewed and Jerman was able to demonstrate them prior to the end of the session.   Pt received a written copy of exercises to perform at home. Jerman demonstrated good  understanding of the education provided.     See EMR under patient instructions for exercises given.   Assessment   Jerman is a 67 y.o. male referred to outpatient Physical Therapy s/p ORIF to 2nd-3rd metatarsals. Pt presents with  decreased strength, decreased ROM, decreased flexibility, decreased joint play, impaired gait, and increased pain. Due to impairments, pt is unable to pick objects off the floor, walk for extended periods, drive and navigate stairs.    Pt prognosis is Good.   Pt will benefit from skilled outpatient Physical Therapy to address the deficits stated above and in the chart below, provide pt/family education, and to maximize pt's level of independence.     Plan of care discussed with patient: Yes  Pt's spiritual, cultural and educational needs considered and patient is agreeable to the plan of care and goals as stated below:     Anticipated Barriers for therapy: difficulty getting a ride to therapy, prolonged initiation of PT due to late insurance authorization    Medical Necessity is demonstrated by the following  History  Co-morbidities and personal factors that may impact the plan of care Co-morbidities:   HTN    Personal Factors:   no deficits     low   Examination  Body Structures and Functions, activity limitations and participation restrictions that may impact the plan of care Body Regions:   lower extremities    Body Systems:    ROM  strength  gait  transfers    Participation Restrictions:   Pt unable to ambulate or pick objects off the floor    Activity limitations:   Learning and applying knowledge  no deficits    General Tasks and Commands  no deficits    Communication  no deficits    Mobility  lifting and carrying objects  walking  moving around using equipment (WC)  using transportation (bus, train, plane, car)  driving (bike, car, motorcycle)    Self care  no deficits    Domestic Life  shopping  cooking  doing house work (cleaning house, washing dishes, laundry)    Interactions/Relationships  no deficits    Life Areas  no deficits    Community and Social Life  no deficits         low   Clinical Presentation stable and uncomplicated low   Decision Making/ Complexity Score: low     Goals:  Short Term Goals: 3  weeks   1. Pt will demonstrate a 5 degree increase in ankle dorsiflexion bilat  2. Pt will be able to ambulate with walking boot and without any assistive device reporting no pain  3. Pt will be able to tolerate SLS for 10 seconds on R LE without LOB    Long Term Goals: 10 weeks   1. Pt will demonstrate symmetrical ankle strength at least 4+/5 in all directions  2. Pt will be able to walk for 1 mile reporting no pian in the R foot and ankle   3. Pt will be independent in home exercises/maintenance routine    Plan   Plan of care Certification: 7/13/2018 to 9/13/17.    Outpatient Physical Therapy 2 times weekly for 10 weeks to include the following interventions: Aquatic Therapy, Gait Training, Manual Therapy, Moist Heat/ Ice, Neuromuscular Re-ed, Patient Education, Self Care, Therapeutic Activites, Therapeutic Exercise and Ultrasound.     Alvino Hung, PT

## 2018-07-16 ENCOUNTER — CLINICAL SUPPORT (OUTPATIENT)
Dept: REHABILITATION | Facility: HOSPITAL | Age: 68
End: 2018-07-16
Payer: MEDICARE

## 2018-07-16 DIAGNOSIS — M25.673 DECREASED ROM OF ANKLE: ICD-10-CM

## 2018-07-16 DIAGNOSIS — M79.671 RIGHT FOOT PAIN: Primary | ICD-10-CM

## 2018-07-16 PROCEDURE — 97140 MANUAL THERAPY 1/> REGIONS: CPT | Mod: PO

## 2018-07-16 PROCEDURE — 97110 THERAPEUTIC EXERCISES: CPT | Mod: PO

## 2018-07-16 NOTE — PROGRESS NOTES
Physical Therapy Daily Treatment Note     Name: Jerman GALINDO [facility]  Clinic Number: [id number]    Therapy Diagnosis:   Encounter Diagnoses   Name Primary?    Right foot pain Yes    Decreased ROM of ankle      Visit Date: 7/16/2018  Physician Orders: PT Eval and Treat/ gait training  Medical Diagnosis from Referral: Dislocation of tarsometatarsal joints of R foot w/ fracture   Evaluation Date: 7/13/2018  Authorization Period Expiration: 6/5/18  Plan of Care Expiration: 9/13/18  Visit # / Visits authorized: 2/ 12  Time In:8:00  Time Out:  9:00  Treatment time: 50  Total Billable Time: 50 minutes    Precautions:Weightbearing as tolerated for 3 weeks, ween from the boot at the 3 week cindy (as of 6/19/18)      Subjective     Pt reports: some continued (R) foot pain/discomfort. States that the pain is mostly on the dorsum of his (R) foot.   Pain: 5-6/10     Objective   Patient to clinic ambulating Modified independently with (B) axillary crutches, (R) LE walking boot.    Jerman received therapeutic exercises to develop (R) foot/ankle strength, endurance, ROM and flexibility for 40 minutes including:    Seated ex's:   · Gastroc stretch with strap 2 x 30 sec  · Ankle alphabet x 1  · Seated heel slides for DF/PF stretch x 10 with 5 sec hold  · Seated INv/EV towel sweeps x 10 with 5 sec hold  · Ankle ROM/proprioceptive training performing DF/PF, Inv/EV and CW/CCW motions x 10 each ( cues to avoid compensations )  · Seated heel raises 2 x 10  · 4 way ankle t-band with OTB 2 x 20 each     ( Next visit: Stationary bike, weight shifts with shoe on?)    Patient instructed in gait training x 5 minutes with (U) axillary crutch on (L), (R) walking boot  with CGA.50 feet x 2 trials + cues for sequence/technique.    Jerman received the following manual therapy techniques: Joint mobilizations were applied to the: (R) ankle for 10 minutes, including:  Posterior TC glide Gr II-III  Anterior TC glide Gr II-III  TC distraction Gr  III-IIII  Subtalar supination/ pronation mobilization Gr II-III  Plantar/dorsal glides to metatarsal 2-4 Gr II-III  Plantar glide for 1st MTP extension Gr II-III     Written Home Exercises Provided:  Patient educated to continue with previously issued HEP to tolerance along with updated HEP to include: Seated/heel/toe rasies.  Exercises were reviewed and Jerman was able to demonstrate them prior to the end of the session.  Jerman demonstrated good  understanding of the education provided.     Assessment     Patient allan Tx fairly well. He was able to perform the above ex's/activities without increase pain symptoms. He was progressed to perform limited gait with use of (U) crutch and walking boot however, he did have some unsteadiness with this requiring CGA + cues for coordination/sequencing. He was educated to continue using (B) crutches at this time for safety which he voices understanding. He was also instructed to bring his tennis shoe next session for practice with weaning out of the walking boot.  Patient noted to have pes planus (B) and may benefit from arch supports. Some improvement with (L) ankle/foot flexibility after manual techniques. Pain level remained 3-4/10 post session. Will monitor and attempt to progress as tolerated.  Pt will continue to benefit from skilled outpatient physical therapy to address the deficits listed in the problem list box on initial evaluation, provide pt/family education and to maximize pt's level of independence in the home and community environment.      Goals:  Short Term Goals: 3 weeks   1. Pt will demonstrate a 5 degree increase in ankle dorsiflexion bilat  2. Pt will be able to ambulate with walking boot and without any assistive device reporting no pain  3. Pt will be able to tolerate SLS for 10 seconds on R LE without LOB     Long Term Goals: 10 weeks   1. Pt will demonstrate symmetrical ankle strength at least 4+/5 in all directions  2. Pt will be able to walk for 1  jessica reporting no pian in the R foot and ankle   3. Pt will be independent in home exercises/maintenance routine    Plan     Continue PT towards established plan of care and goals.     Collin Crow, PTA

## 2018-07-18 ENCOUNTER — CLINICAL SUPPORT (OUTPATIENT)
Dept: REHABILITATION | Facility: HOSPITAL | Age: 68
End: 2018-07-18
Payer: MEDICARE

## 2018-07-18 DIAGNOSIS — G89.29 CHRONIC PAIN OF RIGHT ANKLE: ICD-10-CM

## 2018-07-18 DIAGNOSIS — M25.571 CHRONIC PAIN OF RIGHT ANKLE: ICD-10-CM

## 2018-07-18 DIAGNOSIS — M79.671 RIGHT FOOT PAIN: ICD-10-CM

## 2018-07-18 DIAGNOSIS — M25.673 DECREASED ROM OF ANKLE: ICD-10-CM

## 2018-07-18 PROCEDURE — 97140 MANUAL THERAPY 1/> REGIONS: CPT | Mod: PO

## 2018-07-18 PROCEDURE — 97110 THERAPEUTIC EXERCISES: CPT | Mod: PO

## 2018-07-18 NOTE — PROGRESS NOTES
Physical Therapy Daily Treatment Note     Name: Jerman Miguel  Clinic Number: [id number]    Therapy Diagnosis:   No diagnosis found.  Visit Date: 7/18/2018  Physician Orders: PT Eval and Treat/ gait training  Medical Diagnosis from Referral: Dislocation of tarsometatarsal joints of R foot w/ fracture   Evaluation Date: 7/13/2018  Authorization Period Expiration: 6/5/18  Plan of Care Expiration: 9/13/18  Visit # / Visits authorized: 3/ 12  Time In:8:00  Time Out:  8;55  Treatment time: 55  Total Billable Time: 50 minutes    Precautions:Weightbearing as tolerated for 3 weeks, ween from the boot at the 3 week cindy (as of 6/19/18)      Subjective     Pt reports: some continued (R) foot pain/discomfort. States that the pain is mostly on the dorsum of his (R) foot. He reports no problems after last session.  He reports some (R) foot swelling on occasion.   Pain: 5/10 to (R) foot     Objective   Patient to clinic ambulating Modified independently with (B) axillary crutches, wearing (R)LE tennis shoe    Jerman received therapeutic exercises to develop (R) foot/ankle strength, endurance, ROM and flexibility for 45 minutes including:    Recumbent stationary bike x 5 minutes level 1    Seated ex's:   · Gastroc stretch with strap 2 x 30 sec  · Ankle alphabet x 1   · Seated heel slides for DF/PF stretch x 10 with 5 sec hold  · Seated INv/EV towel sweeps x 10 with 5 sec hold  · Ankle ROM/proprioceptive training performing DF/PF, Inv/EV and CW/CCW motions x 20 each ( cues to avoid compensations )  · Seated heel raises with min manual resistance 2 x 10  · 4 way ankle t-band with OTB 2 x 20 each     Standing ex's:   · Lateral weight shifts <-> x 2 minutes with Light UE support  · Staggered stance weight shifts x 2 minutes with focus on (R)LE push off, Heel strike->foot flat  ( with (U) supoport)         Patient instructed in gait training x 5 minutes with (U) axillary crutch on (L),  with SBA .75feet x 2 trials     [facility]  received the following manual therapy techniques: Joint mobilizations were applied to the: (R) ankle for 10 minutes, including:  Posterior TC glide Gr II-III  Anterior TC glide Gr II-III  TC distraction Gr III-IIII  Subtalar supination/ pronation mobilization Gr II-III  Plantar/dorsal glides to metatarsal 2-4 Gr II-III  Plantar glide for 1st MTP extension Gr II-III     Patient to apply cryotherapy at home.     Written Home Exercises Provided:  Patient educated to continue with previously issued HEP to tolerance. Educated on weaning out of boot techniques with lateral and staggered stance weight shifts with UE support as needed.  Exercises were reviewed and Jerman was able to demonstrate them prior to the end of the session.  Jerman demonstrated good  understanding of the education provided.     Assessment     Patient allan Tx fairly well. He was progressed to perform weaning from boot activities while wearing a tennis shoe on (R) activities without increased pain symptoms.. Improved coordination/technique for progression to gait with (U) crutch on (L) on level surfaces today although remains cautious.  Some improvement with (L) ankle/foot flexibility after manual techniques. Pain level =  3-4/10 post session. Encouraged continued cryotherapy and elevation at home to assist with pain/edema contgorl. . Will monitor and attempt to progress as tolerated.  Pt will continue to benefit from skilled outpatient physical therapy to address the deficits listed in the problem list box on initial evaluation, provide pt/family education and to maximize pt's level of independence in the home and community environment.      Goals:  Short Term Goals: 3 weeks   1. Pt will demonstrate a 5 degree increase in ankle dorsiflexion bilat  2. Pt will be able to ambulate with walking boot and without any assistive device reporting no pain  3. Pt will be able to tolerate SLS for 10 seconds on R LE without LOB     Long Term Goals: 10 weeks   1.  Pt will demonstrate symmetrical ankle strength at least 4+/5 in all directions  2. Pt will be able to walk for 1 mile reporting no pian in the R foot and ankle   3. Pt will be independent in home exercises/maintenance routine    Plan     Continue PT towards established plan of care and goals.     Collin Crow, PTA

## 2018-07-23 ENCOUNTER — CLINICAL SUPPORT (OUTPATIENT)
Dept: REHABILITATION | Facility: HOSPITAL | Age: 68
End: 2018-07-23
Payer: MEDICARE

## 2018-07-23 DIAGNOSIS — M79.671 RIGHT FOOT PAIN: ICD-10-CM

## 2018-07-23 DIAGNOSIS — M25.673 DECREASED ROM OF ANKLE: ICD-10-CM

## 2018-07-23 PROCEDURE — 97110 THERAPEUTIC EXERCISES: CPT | Mod: PO

## 2018-07-23 PROCEDURE — 97140 MANUAL THERAPY 1/> REGIONS: CPT | Mod: PO

## 2018-07-23 NOTE — PROGRESS NOTES
Physical Therapy Daily Treatment Note     Name: Jerman Miguel  Clinic Number: [id number]    Therapy Diagnosis:   Encounter Diagnoses   Name Primary?    Right foot pain     Decreased ROM of ankle      Visit Date: 7/23/2018  Physician Orders: PT Eval and Treat/ gait training  Medical Diagnosis from Referral: Dislocation of tarsometatarsal joints of R foot w/ fracture   Evaluation Date: 7/13/2018  Authorization Period Expiration: 6/5/18  Plan of Care Expiration: 9/13/18  Visit # / Visits authorized: 4/ 12  Time In:8:00  Time Out:  8;55  Treatment time: 55  Total Billable Time: 25 minutes    Precautions:Weightbearing as tolerated for 3 weeks, ween from the boot at the 3 week cindy (as of 6/19/18)      Subjective     Pt reports: some continued (R) foot pain/discomfort. States that the pain is mostly on the dorsum of his (R) foot. He reports that he has been trying to walk more around the house with just one crutch. States that he had some increased foot swelling on the weekend but was able to ice it.   Pain: 5/10 to (R) foot     Objective   Patient to clinic ambulating Modified independently with (B) axillary crutches, wearing (R)LE tennis shoe    Jerman received therapeutic exercises to develop (R) foot/ankle strength, endurance, ROM and flexibility for 45 minutes including:    Recumbent stationary bike x 5 minutes level 1    Seated ex's:   · Gastroc stretch with strap 3 x 30 sec  · Ankle alphabet x 1   · Seated heel slides for DF/PF stretch x 10 with 5 sec hold  · Seated INv/EV towel sweeps x 10 with 5 sec hold  · Ankle ROM/proprioceptive training performing DF/PF, Inv/EV and CW/CCW motions x 20 each ( cues to avoid compensations )  · Seated heel raises with min manual resistance 2 x 10  · 4 way ankle t-band with GTB   x 20 each     Standing ex's:   · Lateral weight shifts <-> x 2 minutes with Light UE support--NP  · Staggered stance weight shifts x 2 minutes with focus on (R)LE push off, Heel strike->foot flat   ( with (U) supoport)   · Rocker board weight shifts A/P and laterally x 20 each with UE support    Supine ex's:   ·  SLR 2 x 10  · SAQ with 2# 2 x 10     Patient instructed in gait training x 5 minutes with (U) axillary crutch on (L),  with SBA .75feet x 2 trials     Jerman received the following manual therapy techniques: Joint mobilizations were applied to the: (R) ankle for 10 minutes, including:  Posterior TC glide Gr II-III  Anterior TC glide Gr II-III  TC distraction Gr III-IIII  Subtalar supination/ pronation mobilization Gr II-III  Plantar/dorsal glides to metatarsal 2-4 Gr II-III  Plantar glide for 1st MTP extension Gr II-III     Patient to apply cryotherapy at home.     Written Home Exercises Provided:  Patient educated to continue with previously issued HEP to tolerance. Educated on weaning out of boot techniques with lateral and staggered stance weight shifts with UE support as needed.  Exercises were reviewed and Jerman was able to demonstrate them prior to the end of the session.  Jerman demonstrated good  understanding of the education provided.     Assessment     Patient allan Tx fairly well.He is still getting used to (R)LE wb'ing tolerance out of his boot and pain is still somewhat of an issue although he was able to demonstrate improved confidence/stability for gait with (U crutch today. He was progressed to perform rocker board weight shifts with UE support without much difficulty or c/o.  .  Some improvement with (L) ankle/foot flexibility after manual techniques. Pain level =  3-4/10 post session. Encouraged continued cryotherapy and elevation at home to assist with pain/edema contgorl. . Will monitor and attempt to progress as tolerated.  Pt will continue to benefit from skilled outpatient physical therapy to address the deficits listed in the problem list box on initial evaluation, provide pt/family education and to maximize pt's level of independence in the home and community environment.       Goals:  Short Term Goals: 3 weeks   1. Pt will demonstrate a 5 degree increase in ankle dorsiflexion bilat  2. Pt will be able to ambulate with walking boot and without any assistive device reporting no pain  3. Pt will be able to tolerate SLS for 10 seconds on R LE without LOB     Long Term Goals: 10 weeks   1. Pt will demonstrate symmetrical ankle strength at least 4+/5 in all directions  2. Pt will be able to walk for 1 mile reporting no pian in the R foot and ankle   3. Pt will be independent in home exercises/maintenance routine    Plan     Continue PT towards established plan of care and goals.     Collin Crow, PTA

## 2018-07-25 ENCOUNTER — CLINICAL SUPPORT (OUTPATIENT)
Dept: REHABILITATION | Facility: HOSPITAL | Age: 68
End: 2018-07-25
Payer: MEDICARE

## 2018-07-25 DIAGNOSIS — M25.673 DECREASED ROM OF ANKLE: ICD-10-CM

## 2018-07-25 DIAGNOSIS — M79.671 RIGHT FOOT PAIN: ICD-10-CM

## 2018-07-25 PROCEDURE — 97140 MANUAL THERAPY 1/> REGIONS: CPT | Mod: PO

## 2018-07-25 PROCEDURE — 97110 THERAPEUTIC EXERCISES: CPT | Mod: PO

## 2018-07-25 NOTE — PROGRESS NOTES
Physical Therapy Daily Treatment Note     Name: Jerman Miguel  Clinic Number: [id number]    Therapy Diagnosis:   Encounter Diagnoses   Name Primary?    Right foot pain     Decreased ROM of ankle      Visit Date: 7/25/2018  Physician Orders: PT Eval and Treat/ gait training  Medical Diagnosis from Referral: Dislocation of tarsometatarsal joints of R foot w/ fracture   Evaluation Date: 7/13/2018  Authorization Period Expiration: 6/5/18  Plan of Care Expiration: 9/13/18  Visit # / Visits authorized: 5/ 12  Time In:8:00  Time Out:  8;55  Treatment time: 55  Total Billable Time: 25 minutes    Precautions:Weightbearing as tolerated for 3 weeks, ween from the boot at the 3 week cindy (as of 6/19/18)      Subjective     Pt reports: some continued (R) foot pain/discomfort but feels it is improving. States that he has been tryin to owean more out of his walking boot and is walking with only 1 crutch consistently now. . States that he met with his MD yesterday who said things are healing appropriately  Pain: 4/10 to (R) foot ( dorsal aspect)     Objective   Patient to clinic ambulating Modified independently with (B) axillary crutches, wearing (R)LE tennis shoe    Jerman received therapeutic exercises to develop (R) foot/ankle strength, endurance, ROM and flexibility for 45 minutes including:    Recumbent stationary bike x8 minutes level 1    Seated ex's:   · Gastroc stretch with strap 3 x 30 sec  · Ankle alphabet x 1   · Seated heel slides for DF/PF stretch x 10 with 5 sec hold  · Seated INv/EV towel sweeps x 10 with 5 sec hold  · Seated heel raises with min manual resistance 3 x 10  · 4 way ankle t-band with GTB   x 20 each     Standing ex's:   · Lateral weight shifts <-> x 2 minutes with Light UE support--NP  · Staggered stance weight shifts x 2 minutes with focus on (R)LE push off, Heel strike->foot flat  ( with (U) supoport)   · Rocker board weight shifts A/P and laterally x 25 each with UE support  · Gastroc  stretch on incline board 3 x 20 sec, level 2  · Ankle ROM/proprioceptive training performing DF/PF, Inv/EV and CW/CCW motions x 20 each ( cues to avoid compensations )  · Lateral step over cups while working on (R)LE weight shift 3 laps of 10 feet with light UE support on counter top    Supine ex's:   ·  SLR with 1# 2 x 10  · SAQ with 2.5# 2 x 10  · Hooklying Hip abdER with GTB 2 x 10      Jerman received the following manual therapy techniques: Joint mobilizations were applied to the: (R) ankle for 10 minutes, including:  Posterior TC glide Gr II-III  Anterior TC glide Gr II-III  TC distraction Gr III-IIII  Subtalar supination/ pronation mobilization Gr II-III  Plantar/dorsal glides to metatarsal 2-4 Gr II-III  Plantar glide for 1st MTP extension Gr II-III     Patient to apply cryotherapy at home.     Written Home Exercises Provided:  Patient educated to continue with previously issued HEP to tolerance. Educated on continued weaning out of boot techniques to tolerance along with   Exercises were reviewed and Jerman was able to demonstrate them prior to the end of the session.  Jerman demonstrated good  understanding of the education provided.     Assessment     Patient allan Tx fairly well. He has progressed to perform consistent gait with (U) crutch Modified independently and is weaning out of the boot and is demonstrating improving (R)LE limb confidence.  Appropriate healing per MD visit this week.   He was progressed to perform (R) ankle proprioceptive training/ankle ROM on dynadisc in standing  Today without much difficulty or c/o Some improvement with (L) ankle/foot flexibility after manual techniques. Pain level =  3-4/10 post session. Encouraged continued cryotherapy and elevation at home to assist with pain/edema control. . Will monitor and attempt to progress as tolerated.  Pt will continue to benefit from skilled outpatient physical therapy to address the deficits listed in the problem list box on initial  evaluation, provide pt/family education and to maximize pt's level of independence in the home and community environment.      Goals:  Short Term Goals: 3 weeks   1. Pt will demonstrate a 5 degree increase in ankle dorsiflexion bilat  2. Pt will be able to ambulate with walking boot and without any assistive device reporting no pain  3. Pt will be able to tolerate SLS for 10 seconds on R LE without LOB     Long Term Goals: 10 weeks   1. Pt will demonstrate symmetrical ankle strength at least 4+/5 in all directions  2. Pt will be able to walk for 1 mile reporting no pian in the R foot and ankle   3. Pt will be independent in home exercises/maintenance routine    Plan     Continue PT towards established plan of care and goals.     Collin Crow, PTA

## 2018-07-30 ENCOUNTER — CLINICAL SUPPORT (OUTPATIENT)
Dept: REHABILITATION | Facility: HOSPITAL | Age: 68
End: 2018-07-30
Payer: MEDICARE

## 2018-07-30 DIAGNOSIS — M25.673 DECREASED ROM OF ANKLE: ICD-10-CM

## 2018-07-30 DIAGNOSIS — M79.671 RIGHT FOOT PAIN: ICD-10-CM

## 2018-07-30 PROCEDURE — 97140 MANUAL THERAPY 1/> REGIONS: CPT | Mod: PO

## 2018-07-30 PROCEDURE — 97110 THERAPEUTIC EXERCISES: CPT | Mod: PO

## 2018-07-30 NOTE — PROGRESS NOTES
Physical Therapy Daily Treatment Note     Name: Jerman Miguel  Clinic Number: [id number]    Therapy Diagnosis:   Encounter Diagnoses   Name Primary?    Right foot pain     Decreased ROM of ankle      Visit Date: 7/30/2018  Physician Orders: PT Eval and Treat/ gait training  Medical Diagnosis from Referral: Dislocation of tarsometatarsal joints of R foot w/ fracture   Evaluation Date: 7/13/2018  Authorization Period Expiration: 6/5/18  Plan of Care Expiration: 9/13/18  Visit # / Visits authorized: 6/ 12  Time In:7:50  Time Out:  8:50  Treatment time: 55  Total Billable Time: 25 minutes    Precautions:Weightbearing as tolerated for 3 weeks, ween from the boot at the 3 week cindy (as of 6/19/18)      Subjective     Pt reports: some continued (R) foot pain/discomfort but feels it is improving. States that he has started driving again which is encouraging.   Pain: 4/10 to (R) foot ( dorsal aspect)     Objective   Patient to clinic ambulating Modified independently with ((U) axillary crutch on (L)     Jerman received therapeutic exercises to develop (R) foot/ankle strength, endurance, ROM and flexibility for 45 minutes including:    Recumbent stationary bike x 10 minutes level 1    Seated ex's:   · Gastroc stretch with strap 3 x 30 sec--NP (HEP)  · Ankle alphabet x 1-NP (HEP)  · Seated heel slides for DF/PF stretch x 10 with 5 sec hold--NP ( HEP  · Seated INv/EV towel sweeps x 10 with 5 sec hold-NP ( HEP  · Seated heel raises with min manual resistance 3 x 10  · 4 way ankle t-band with GTB   x 20 each     Standing ex's:   · Lateral weight shifts <-> x 2 minutes with Light UE support--NP  · Staggered stance weight shifts x 2 minutes with focus on (R)LE push off, Heel strike->foot flat  ( with (U) supoport)   · Rocker board weight shifts A/P and laterally x 25 each with UE support  · Gastroc stretch on incline board 3 x 20 sec, level 2  · Ankle ROM/proprioceptive training performing DF/PF, Inv/EV and CW/CCW motions x  20 each ( cues to avoid compensations )  · Lateral step over cups while working on (R)LE weight shift 3 laps of 10 feet with light UE support on counter top  · Lateral band walk with YTB 4 laps of 10 feet    Supine ex's:   ·  SLR with 1# 2 x 10  · SAQ with 3# 2 x 10  · Hooklying Hip abdER with BTB 2 x 10  · Bridging 2 x 10    Machines:    · Shuttle leg press with 2.5bands (B) 2 x 10  · Heel raises on shuttle with 1 black band 2 x 10    Jerman received the following manual therapy techniques: Joint mobilizations were applied to the: (R) ankle for 10 minutes, including:  Posterior TC glide Gr II-III  Anterior TC glide Gr II-III  TC distraction Gr III-IIII  Subtalar supination/ pronation mobilization Gr II-III  Plantar/dorsal glides to metatarsal 2-4 Gr II-III  Plantar glide for 1st MTP extension Gr II-III     Patient to apply cryotherapy at home.     Written Home Exercises Provided:  Patient educated to continue with previously issued HEP to tolerance.    Exercises were reviewed and Jerman was able to demonstrate them prior to the end of the session.  Jerman demonstrated good  understanding of the education provided.     Assessment     Patient allan Tx fairly well.  He was progresses to perform the shuttle leg press and heel raises on shuttle leg press without c/o. He demonstrates improved function with his ability to drivie again .  He has progressed to perform consistent gait with (U) crutch Modified independently and is weaning out of the boot and is demonstrating improving (R)LE limb confidence although still not ready to ambulate without AD at this time. .  Appropriate healing per MD visit last week.    Some improvement with (L) ankle/foot flexibility after manual techniques. Pain level =  3-4/10 post session. Encouraged continued cryotherapy and elevation at home to assist with pain/edema control. . Will monitor and attempt to progress as tolerated.  Pt will continue to benefit from skilled outpatient physical  therapy to address the deficits listed in the problem list box on initial evaluation, provide pt/family education and to maximize pt's level of independence in the home and community environment.      Goals:  Short Term Goals: 3 weeks   1. Pt will demonstrate a 5 degree increase in ankle dorsiflexion bilat  2. Pt will be able to ambulate with walking boot and without any assistive device reporting no pain  3. Pt will be able to tolerate SLS for 10 seconds on R LE without LOB     Long Term Goals: 10 weeks   1. Pt will demonstrate symmetrical ankle strength at least 4+/5 in all directions  2. Pt will be able to walk for 1 mile reporting no pian in the R foot and ankle   3. Pt will be independent in home exercises/maintenance routine    Plan     Continue PT towards established plan of care and goals.     Collin Crow, PTA

## 2018-08-01 ENCOUNTER — CLINICAL SUPPORT (OUTPATIENT)
Dept: REHABILITATION | Facility: HOSPITAL | Age: 68
End: 2018-08-01
Payer: MEDICARE

## 2018-08-01 DIAGNOSIS — M79.671 RIGHT FOOT PAIN: ICD-10-CM

## 2018-08-01 DIAGNOSIS — M25.673 DECREASED ROM OF ANKLE: ICD-10-CM

## 2018-08-01 PROCEDURE — 97110 THERAPEUTIC EXERCISES: CPT | Mod: PO

## 2018-08-01 NOTE — PROGRESS NOTES
Re-assessment   Physical Therapy Daily Treatment Note     Name: Jerman Miguel  Clinic Number: [id number]    Therapy Diagnosis:   Encounter Diagnoses   Name Primary?    Right foot pain     Decreased ROM of ankle      Visit Date: 8/1/2018  Physician Orders: PT Eval and Treat/ gait training  Medical Diagnosis from Referral: Dislocation of tarsometatarsal joints of R foot w/ fracture   Evaluation Date: 7/13/2018  Authorization Period Expiration: 6/5/18  Plan of Care Expiration: 9/13/18  Visit # / Visits authorized: 6/ 12  Time In:9:00  Time Out:  10:10  Treatment time: 60  Total Billable Time: 25 minutes    Precautions:Weightbearing as tolerated for 3 weeks, ween from the boot at the 3 week cindy (as of 6/19/18)      Subjective     Pt reports: he feels everything seems to be going well. Pt has just about completely d/c walking boot, only uses it about 3 hrs a day if ti is feeling more sore. Pt continues to ambulate with one crutch due to balance concerns and some soreness. Pt reports some concern about the swelling, reports he has been icing at least one hour a day (15min on 15 min off). Pt reports he still sleeps with the walking boot.     Pain: 4/10 to (R) foot ( dorsal aspect)     Objective     Re-assessment = 20 min    Patient to clinic ambulating Modified independently with ((U) axillary crutch on (L)     Gait: pt demonstrates increased pronation bilat however falls into supination at push off, potentially due to restricted 1st MTP extension. Pt demonstrates decreased stride length L>R, decreased stance time on R. Pt reports walking barefoot feels better than walking in his shoes at times    Ankle Range of Motion: AROM (PROM)  Ankle Left Right   Dorsiflexion 8  5    Plantarflexion 25 26   Inversion 30  15   Eversion 10  10      Strength:  Ankle Left Right   Dorsiflexion 5 5   Plantarflexion 5 4- P!   Inversion 5 4   Eversion 5 4     SLS:  L = 3 sec  R = 2 sec P! On the dorsal aspect of foot    Jerman dominique  therapeutic exercises to develop (R) foot/ankle strength, endurance, ROM and flexibility for 38 minutes including:    Recumbent stationary bike x 10 minutes level 1    Seated ex's:   · Seated heel slides for DF/PF stretch x 10 with 5 sec hold--NP ( HEP  · Seated INv/EV towel sweeps x 10 with 5 sec hold-NP ( HEP  · Seated heel raises with min manual resistance 3 x 10 - NP  · 4 way ankle t-band with GTB   x 20 each - NP  · Arch doming 2x10  · Toe curl with ankle circles 2x15 each way    Standing ex's:   · Lateral weight shifts <-> x 2 minutes with Light UE support--NP  · Staggered stance weight shifts x 2 minutes with focus on (R)LE push off, Heel strike->foot flat  ( with (U) supoport)   · Rocker board weight shifts A/P and laterally x 25 each with UE support  · Gastroc stretch on incline board 3 x 20 sec, level 2  · Ankle ROM/proprioceptive training performing DF/PF, Inv/EV and CW/CCW motions x 20 each ( cues to avoid compensations )  · Lateral step over cups while working on (R)LE weight shift 3 laps of 10 feet with light UE support on counter top  · Lateral band walk with YTB 4 laps of 10 feet  · Supination/ Pronation step 2x10  · 1st MTP extension push off 2x15 each    Supine ex's:   ·  SLR with 1# 2 x 10  · SAQ with 3# 2 x 10  · Hooklying Hip abdER with BTB 2 x 10  · Bridging 2 x 10    Machines:    · Shuttle leg press with 2.5bands (B) 2 x 10  · Heel raises on shuttle with 1 black band 2 x 10    Jerman received the following manual therapy techniques: Joint mobilizations were applied to the: (R) ankle for 2 minutes, including:  Posterior TC glide Gr II-III  Anterior TC glide Gr II-III  TC distraction Gr III-IIII  Subtalar supination/ pronation mobilization Gr II-III  Plantar/dorsal glides to metatarsal 2-4 Gr II-III  Plantar glide for 1st MTP extension Gr II-III     Applied cold pack to R ankle for 15 min post treatment    Patient to apply cryotherapy at home.     Written Home Exercises Provided:  Patient  educated to continue with previously issued HEP to tolerance.    Exercises were reviewed and Jerman was able to demonstrate them prior to the end of the session.  Jerman demonstrated good  understanding of the education provided.     Assessment     Patient has made modest gains in function since the eval. Pt has been able to ween off one crutch and the walking boot but still requires one axillary crutch for balance and so he does not have to be FWB on R. Some objective measurements could be skewed due to increased edema in R ankle compared to eval. Pt continues to demonstrate decreased strength, decreased ROM, decreased flexibility, faulty posture, decreased balance, impaired gait, and increased pain. Due to impairments, pt is unable to ambulate for extended periods, navigate stairs and  objects from the floor . Pt will continue to benefit from skilled outpatient physical therapy to address the deficits listed in the problem list box on initial evaluation, provide pt/family education and to maximize pt's level of independence in the home and community environment.      Goals:  Short Term Goals: 3 weeks   1. Pt will demonstrate a 5 degree increase in ankle dorsiflexion bilat - MET  2. Pt will be able to ambulate with walking boot and without any assistive device reporting no pain - progressing  3. Pt will be able to tolerate SLS for 10 seconds on R LE without LOB - Progressing     Long Term Goals: 10 weeks   1. Pt will demonstrate symmetrical ankle strength at least 4+/5 in all directions  2. Pt will be able to walk for 1 mile reporting no pian in the R foot and ankle   3. Pt will be independent in home exercises/maintenance routine    New/updated goals:    1. Pt will be able to walk without assistive device or any loss of balance within 2 weeks  2. Pt will be able to ascend/ descend 1 flight of stairs reporting <2/10 pain within 6 weeks to demonstrate improved ankle ROM / function     Plan     Continue PT  towards established plan of care and goals.     Alvino Hung, PT

## 2018-08-06 ENCOUNTER — CLINICAL SUPPORT (OUTPATIENT)
Dept: REHABILITATION | Facility: HOSPITAL | Age: 68
End: 2018-08-06
Payer: MEDICARE

## 2018-08-06 DIAGNOSIS — M79.671 RIGHT FOOT PAIN: ICD-10-CM

## 2018-08-06 DIAGNOSIS — M25.673 DECREASED ROM OF ANKLE: ICD-10-CM

## 2018-08-06 PROCEDURE — 97140 MANUAL THERAPY 1/> REGIONS: CPT | Mod: PO

## 2018-08-06 PROCEDURE — 97110 THERAPEUTIC EXERCISES: CPT | Mod: PO

## 2018-08-06 NOTE — PROGRESS NOTES
Re-assessment   Physical Therapy Daily Treatment Note     [facility]  [facility] Number: [id number]    Therapy Diagnosis:   No diagnosis found.  Visit Date: 8/6/2018  Physician Orders: PT Eval and Treat/ gait training  Medical Diagnosis from Referral: Dislocation of tarsometatarsal joints of R foot w/ fracture   Evaluation Date: 7/13/2018  Authorization Period Expiration: 6/5/18  Plan of Care Expiration: 9/13/18  Visit # / Visits authorized: 7/ 12  Time In: 8:00  Time Out:  9:00  Treatment time: 60  Total Billable Time: 60 minutes    Precautions:Weightbearing as tolerated for 3 weeks, ween from the boot at the 3 week cindy (as of 6/19/18)      Subjective     Pt reports:he has been having too much pain with shoes, prefers to walk with walking boot. Pt reports his wife bought him some new shoes however the crease continues to dig into the dorsal aspect of his foot which has been painful. Pt reports walking barefoot is more comfortable than walking in sneakers. Continues to ice, no problems with HEP exercises although balance continues to be a challenge    Pain: 3/10 to (R) foot ( dorsal aspect)     Objective       Jerman received therapeutic exercises to develop (R) foot/ankle strength, endurance, ROM and flexibility for 37 minutes including:    Recumbent stationary bike x 10 minutes level 1    Seated ex's:   · Seated heel slides for DF/PF stretch x 10 with 5 sec hold--NP ( HEP  · Seated INv/EV towel sweeps x 10 with 5 sec hold-NP ( HEP  · Seated heel raises with min manual resistance 3 x 10 - NP  · 4 way ankle t-band with GTB   x 20 each   · Arch doming 2x10  · Toe curl with ankle circles 2x15 each way    Standing ex's:   · Lateral weight shifts <-> x 2 minutes with Light UE support--NP  · Staggered stance weight shifts x 2 minutes with focus on (R)LE push off, Heel strike->foot flat  ( with (U) supoport)   · Rocker board weight shifts A/P and laterally x 25 each with UE support - NP  · Gastroc stretch on  incline board 3 x 20 sec, level 2  · Ankle ROM/proprioceptive training performing DF/PF, Inv/EV and CW/CCW motions x 20 each ( cues to avoid compensations) - NP  · Lateral step over cups while working on (R)LE weight shift 3 laps of 10 feet with light UE support on counter top  · Lateral band walk with YTB 4 laps of 10 feet - NP  · Supination/ Pronation step 2x10 (limited ROM on the cross over step)  · 1st MTP extension push off 2x15 each  · Green foam SLS star drill (bilat UE support w/ dowels) 2x10  · Gait training with ladder and retro walk with focus on supination/protation during gait cycle (UE support from dowels) - 10 min  · Standing feet together balance:  · Eyes closed 2x10 sec  · 10 head turns  · 10 head nods    Supine ex's:   ·  SLR with 1# 2 x 10 - NP  · SAQ with 3# 2 x 10 - NP  · Hooklying Hip abdER with BTB 2 x 10  · Bridging 2 x 10 - NP    Machines:    · Shuttle leg press with 2.5bands (B) 2 x 10 - NP  - Heel raises on shuttle with 1 black band 2 x 10 - NP      Jerman received the following manual therapy techniques: Joint mobilizations were applied to the: (R) ankle for 13 minutes, including:  Posterior TC glide Gr II-III  Anterior TC glide Gr II-III  TC distraction Gr III-IIII  Subtalar supination/ pronation mobilization Gr II-III  Plantar/dorsal glides to metatarsal 2-4 Gr II-III  Plantar glide for 1st MTP extension Gr II-III     Applied cold pack to R ankle for 15 min post treatment -- NP    Patient to apply cryotherapy at home.     Written Home Exercises Provided:  Patient educated to continue with previously issued HEP to tolerance.    Exercises were reviewed and Jerman was able to demonstrate them prior to the end of the session.  Jerman demonstrated good  understanding of the education provided.     Assessment     Pt presents with walking boot and no assistive device. Pt demonstrates improved edema since last treatment. Pt continues to be restricted into 1st MTP extension, benefits from  mobiliziations and stretching to increase ROM. Pt demonstrates decreased balance bilat, modified some standing exercises to decrease balance demands. Pt tolerated gait training barefoot today without any reports of pain, although does demonstrate difficulty during single leg stance. In double leg stance pt able to maintain balance with eyes closed and with head turns well. Will continue to focus on dynamic balance exercises.        Goals:  Short Term Goals: 3 weeks   1. Pt will demonstrate a 5 degree increase in ankle dorsiflexion bilat - MET  2. Pt will be able to ambulate with walking boot and without any assistive device reporting no pain - progressing  3. Pt will be able to tolerate SLS for 10 seconds on R LE without LOB - Progressing     Long Term Goals: 10 weeks   1. Pt will demonstrate symmetrical ankle strength at least 4+/5 in all directions  2. Pt will be able to walk for 1 mile reporting no pian in the R foot and ankle   3. Pt will be independent in home exercises/maintenance routine    New/updated goals:    1. Pt will be able to walk without assistive device or any loss of balance within 2 weeks  2. Pt will be able to ascend/ descend 1 flight of stairs reporting <2/10 pain within 6 weeks to demonstrate improved ankle ROM / function     Plan     Continue PT towards established plan of care and goals.     Alvino Hung, PT

## 2018-08-08 ENCOUNTER — CLINICAL SUPPORT (OUTPATIENT)
Dept: REHABILITATION | Facility: HOSPITAL | Age: 68
End: 2018-08-08
Payer: MEDICARE

## 2018-08-08 DIAGNOSIS — M25.673 DECREASED ROM OF ANKLE: ICD-10-CM

## 2018-08-08 DIAGNOSIS — M79.671 RIGHT FOOT PAIN: ICD-10-CM

## 2018-08-08 PROCEDURE — 97110 THERAPEUTIC EXERCISES: CPT | Mod: PO

## 2018-08-08 PROCEDURE — 97140 MANUAL THERAPY 1/> REGIONS: CPT | Mod: PO

## 2018-08-08 NOTE — PROGRESS NOTES
Re-assessment   Physical Therapy Daily Treatment Note     Name: Jerman Miguel  Clinic Number: [id number]    Therapy Diagnosis:   No diagnosis found.  Visit Date: 8/8/2018  Physician Orders: PT Eval and Treat/ gait training  Medical Diagnosis from Referral: Dislocation of tarsometatarsal joints of R foot w/ fracture   Evaluation Date: 7/13/2018  Authorization Period Expiration: 6/5/18  Plan of Care Expiration: 9/13/18  Visit # / Visits authorized: 9/ 12  Time In: 8:00  Time Out:  9:00  Treatment time: 60  Total Billable Time: 60 minutes    Precautions:Weightbearing as tolerated for 3 weeks, ween from the boot at the 3 week cindy (as of 6/19/18)      Subjective     Pt reports: he was looking for shoes yesterday with his wife. Found some that did not cause pain on the top of foot however they were too expensive. Pt reports he has an appointment with an orthopedic shoe specialist Thursday. Pt reports no soreness after last treatment no issues walking barefoot around the house.  Pt reports he noticed his R knee got swollen after performing the exercise but reports no pain.   Pain: 3-4/10 to (R) foot ( dorsal aspect) mostly when wearing shoes    Objective     BP taken in L UE seated (after riding stationary bike): 125/93    Circumference at mid patella  R knee 43 cm  L knee 42 cm    Jerman received therapeutic exercises to develop (R) foot/ankle strength, endurance, ROM and flexibility for 40 minutes including:    Recumbent stationary bike x 10 minutes level 1    Seated ex's:   · Seated heel slides for DF/PF stretch x 10 with 5 sec hold--NP ( HEP  · 4 way ankle t-band with GTB   x 20 each   · Arch doming 2x10  · Toe yoga 2x10  · Toe curl with ankle circles 2x15 each way    Standing ex's:   · Staggered stance weight shifts x 2 minutes with focus on (R)LE push off, Heel strike->foot flat  ( with (U) supoport)   · Rocker board weight shifts A/P and laterally x 25 each with UE support  · Gastroc stretch on incline board 3  x 20 sec, level 2  · Ankle ROM/proprioceptive training performing DF/PF, Inv/EV and CW/CCW motions x 20 each ( cues to avoid compensations) - NP  · Lateral step over cups while working on (R)LE weight shift 3 laps of 10 feet with light UE support on counter top  · Lateral band walk with OTB 4 laps of 10 feet  · Supination/ Pronation step 2x10 (limited ROM on the cross over step)  · 1st MTP extension push off 2x15 each  · Green foam SLS star drill (bilat UE support w/ dowels) 2x10 - NP  · Gait training with two dowel UE support) and retro walk with focus on supination/protation during gait cycle (UE support from dowels) - 10 min  · Standing feet together/ tandem stance balance:  · Eyes closed 2x10 sec  · 10 head turns  · 10 head nods    Supine ex's:   ·  SLR with 1# 2 x 10 - NP  · SAQ with 3# 2 x 10 - NP  · Hooklying Hip abdER with BTB 2 x 10  · Bridging 2 x 10 - NP    Machines:    · Shuttle leg press with 2.5bands (B) 2 x 10 - NP  - Heel raises on shuttle with 1 black band 2 x 10 - NP      Jerman received the following manual therapy techniques: Joint mobilizations were applied to the: (R) ankle for 10 minutes, including:  Posterior TC glide Gr II-III  Anterior TC glide Gr II-III  TC distraction Gr III-IIII  Subtalar supination/ pronation mobilization Gr II-III  Plantar/dorsal glides to metatarsal 2-4 Gr II-III  Plantar glide for 1st MTP extension Gr II-III     Applied cold pack to R ankle for 15 min post treatment -- NP    Patient to apply cryotherapy at home.     Written Home Exercises Provided:  Patient educated to continue with previously issued HEP to tolerance adding balance work and monitoring swelling in the knee.    Exercises were reviewed and Jerman was able to demonstrate them prior to the end of the session.  Jerman demonstrated good  understanding of the education provided.     Assessment     Pt presents with no walking boot and 1 axillary crutch. Pt able to ambulate without crutches if he is not  wearing shoes. Pt demonstrates slight edema to R knee compared to L. Pt benefits from 1st MTP mobilizations and anterior TC mobilizations (pt anterior TC capsule more restricted than posterior), continues to demonstrate greater 1st MTP extension on R vs L. Pt demonstrates good balance with feet together however once in tandem exhibits more ankle/hip strategies.  Pt will continue to benefit from balance, gait and ROM exercises.      Goals:  Short Term Goals: 3 weeks   1. Pt will demonstrate a 5 degree increase in ankle dorsiflexion bilat - MET  2. Pt will be able to ambulate with walking boot and without any assistive device reporting no pain - progressing  3. Pt will be able to tolerate SLS for 10 seconds on R LE without LOB - Progressing     Long Term Goals: 10 weeks   1. Pt will demonstrate symmetrical ankle strength at least 4+/5 in all directions  2. Pt will be able to walk for 1 mile reporting no pian in the R foot and ankle   3. Pt will be independent in home exercises/maintenance routine    New/updated goals:    1. Pt will be able to walk without assistive device or any loss of balance within 2 weeks  2. Pt will be able to ascend/ descend 1 flight of stairs reporting <2/10 pain within 6 weeks to demonstrate improved ankle ROM / function     Plan     Continue PT towards established plan of care and goals.     Alvino Hung, PT

## 2018-08-13 ENCOUNTER — CLINICAL SUPPORT (OUTPATIENT)
Dept: REHABILITATION | Facility: HOSPITAL | Age: 68
End: 2018-08-13
Payer: MEDICARE

## 2018-08-13 DIAGNOSIS — M25.673 DECREASED ROM OF ANKLE: ICD-10-CM

## 2018-08-13 DIAGNOSIS — M79.671 RIGHT FOOT PAIN: ICD-10-CM

## 2018-08-13 PROCEDURE — 97110 THERAPEUTIC EXERCISES: CPT | Mod: PO

## 2018-08-13 PROCEDURE — 97140 MANUAL THERAPY 1/> REGIONS: CPT | Mod: PO

## 2018-08-13 NOTE — PROGRESS NOTES
Physical Therapy Daily Treatment Note     [facility]  Cl[facility] Number: [id number]    Therapy Diagnosis:   No diagnosis found.  Visit Date: 8/13/2018  Physician Orders: PT Eval and Treat/ gait training  Medical Diagnosis from Referral: Dislocation of tarsometatarsal joints of R foot w/ fracture   Evaluation Date: 7/13/2018  Authorization Period Expiration: 6/5/18  Plan of Care Expiration: 9/13/18  Visit # / Visits authorized: 10/ 12  Time In: 8:00  Time Out:  9:00  Treatment time:   Total Billable Time:  35 minutes    Precautions:Weightbearing as tolerated for 3 weeks, ween from the boot at the 3 week cindy (as of 6/19/18)      Subjective     Pt reports: he was seen by an orthotics specialist last week. Pt reports he suggested pt get some custom orthotics to help with over pronation. Pt reports he was also instructed to not to wear his sneakers due to the symptoms they were provoking     Pain: 3-4/10 to (R) foot ( dorsal aspect) mostly when wearing shoes    Objective     Dorsiflexion stretch    Jerman received therapeutic exercises to develop (R) foot/ankle strength, endurance, ROM and flexibility for 40 minutes including:    Recumbent stationary bike x 10 minutes level 1    Seated ex's:   · Seated heel slides for DF/PF stretch x 10 with 5 sec hold--NP ( HEP  · 4 way ankle t-band with GTB   x 20 each   · Arch doming 2x10  · Toe yoga 2x10  · Toe curl with ankle circles 2x15 each way    Standing ex's:   · Staggered stance weight shifts x 2 minutes with focus on (R)LE push off, Heel strike->foot flat  ( with (U) supoport)   · Rocker board weight shifts A/P and laterally x 25 each with UE support  · Gastroc stretch on incline board 3 x 20 sec, level 2  · Ankle ROM/proprioceptive training performing DF/PF, Inv/EV and CW/CCW motions x 20 each ( cues to avoid compensations) - NP  · Lateral step over cups while working on (R)LE weight shift 3 laps of 10 feet with light UE support on counter top  · Lateral band  walk with OTB 4 laps of 10 feet  · Supination/ Pronation step 2x10 (limited ROM on the cross over step)  · 1st MTP extension push off 2x15 each  · Green foam SLS star drill (bilat UE support w/ dowels) 2x10 - NP  · Gait training with two dowel UE support) and retro walk with focus on supination/protation during gait cycle (UE support from dowels) - 10 min  · Standing feet together/ tandem stance balance:  · Eyes closed 2x10 sec  · 10 head turns  · 10 head nods    Supine ex's:   ·  SLR with 1# 2 x 10 - NP  · SAQ with 3# 2 x 10 - NP  · Hooklying Hip abdER with BTB 2 x 10 - NP  · Bridging 2 x 10     Machines:    · Shuttle leg press with 2.5bands (B) 2 x 10 - NP  - Heel raises on shuttle with 1 black band 2 x 10 - NP      Jerman received the following manual therapy techniques: Joint mobilizations were applied to the: (R) ankle for 10 minutes, including:    Anterior TC glide Gr II-III  TC distraction Gr III-IIII  Subtalar supination/ pronation mobilization Gr II-III  Plantar/dorsal glides to metatarsal 2-4 Gr II-III  Plantar glide for 1st MTP extension Gr II-III     Applied cold pack to R ankle for 15 min post treatment -- NP    Patient to apply cryotherapy at home.     Written Home Exercises Provided:  Patient educated to continue with previously issued HEP to tolerance adding balance work and monitoring swelling in the knee.    Exercises were reviewed and Jerman was able to demonstrate them prior to the end of the session.  Jerman demonstrated good  understanding of the education provided.     Assessment     Pt presents with no assistive device wearing slippers. Pt continues to demonstrate decreased plantarflexion, tolerates anterior TC glide well. Pt appears to be making some progress with intrinsic foot muscle activation during arch doming and is better able to supinate/pronate during exaggerated walk form. Pt's balance has been improving however ankle strategies limited by lack of ROM at the ankle. Will continue to  progress ROM exercises to pt tolerance.      Goals:  Short Term Goals: 3 weeks   1. Pt will demonstrate a 5 degree increase in ankle dorsiflexion bilat - MET  2. Pt will be able to ambulate with walking boot and without any assistive device reporting no pain - progressing  3. Pt will be able to tolerate SLS for 10 seconds on R LE without LOB - Progressing     Long Term Goals: 10 weeks   1. Pt will demonstrate symmetrical ankle strength at least 4+/5 in all directions  2. Pt will be able to walk for 1 mile reporting no pian in the R foot and ankle   3. Pt will be independent in home exercises/maintenance routine    New/updated goals:    1. Pt will be able to walk without assistive device or any loss of balance within 2 weeks  2. Pt will be able to ascend/ descend 1 flight of stairs reporting <2/10 pain within 6 weeks to demonstrate improved ankle ROM / function     Plan     Continue PT towards established plan of care and goals.     Alvino Hung, PT

## 2018-08-15 ENCOUNTER — CLINICAL SUPPORT (OUTPATIENT)
Dept: REHABILITATION | Facility: HOSPITAL | Age: 68
End: 2018-08-15
Attending: INTERNAL MEDICINE
Payer: MEDICARE

## 2018-08-15 DIAGNOSIS — M79.671 RIGHT FOOT PAIN: ICD-10-CM

## 2018-08-15 DIAGNOSIS — M25.673 DECREASED ROM OF ANKLE: ICD-10-CM

## 2018-08-15 PROCEDURE — 97140 MANUAL THERAPY 1/> REGIONS: CPT | Mod: PO

## 2018-08-15 PROCEDURE — 97110 THERAPEUTIC EXERCISES: CPT | Mod: PO

## 2018-08-15 NOTE — PROGRESS NOTES
Physical Therapy Daily Treatment Note     Name: Jerman Miguel  Clinic Number: [id number]    Therapy Diagnosis:   Encounter Diagnoses   Name Primary?    Right foot pain     Decreased ROM of ankle      Visit Date: 8/15/2018  Physician Orders: PT Eval and Treat/ gait training  Medical Diagnosis from Referral: Dislocation of tarsometatarsal joints of R foot w/ fracture   Evaluation Date: 7/13/2018  Authorization Period Expiration: 6/5/18  Plan of Care Expiration: 9/13/18  Visit # / Visits authorized: 11/ 12  Time In: 8:00  Time Out:  8:58  Treatment time:   Total Billable Time:  58 minutes    Precautions:Weightbearing as tolerated for 3 weeks, ween from the boot at the 3 week cindy (as of 6/19/18)      Subjective     Pt reports: he was unable to fit into his new shoes due to them not being broken in and he feels his ankle may be a little swollen. Pt reports his knee seems to be swelling again, he is unsure if he should return to wearing walking boot    Pain: 3-4/10 to (R) foot ( dorsal aspect) mostly in the afternoon after walking/ doing exercises    Objective         R Knee 43 cm     Jerman received therapeutic exercises to develop (R) foot/ankle strength, endurance, ROM and flexibility for 40 minutes including:    Recumbent stationary bike x 5 minutes level 1    Seated ex's:   · Seated heel slides for DF/PF stretch x 10 with 5 sec hold-  · 4 way ankle t-band with GTB   x 20 each - NP  · Arch doming 2x10 - NP  · Toe yoga 2x10 - NP  · Toe curl with ankle circles 2x15 each way - NP  · BAPS board lvl 2: 2x20 circles each way     Standing ex's:   · Staggered stance weight shifts x 2 minutes with focus on (R)LE push off, Heel strike->foot flat  ( with (U) supoport)   · Rocker board weight shifts A/P and laterally x 25 each with UE support - NP  · Gastroc stretch on incline board 3 x 20 sec, level 2  · Gastroc/ soleus stretch on floor against wall  · Ankle ROM/proprioceptive training performing DF/PF, Inv/EV and  CW/CCW motions x 20 each ( cues to avoid compensations) - NP  · Lateral step over cups while working on (R)LE weight shift 3 laps of 10 feet with light UE support on counter top - NP  · Lateral band walk with OTB 4 laps of 10 feet - NP  · Supination/ Pronation step 2x10 (limited ROM on the cross over step)  · 1st MTP extension push off 2x15 each  · Green foam SLS star drill (bilat UE support w/ dowels) 2x10 - NP  · Gait training with two dowel UE support) and retro walk with focus on supination/protation during gait cycle (UE support from dowels) - 10 min  · Standing feet together/ tandem stance balance:  · Eyes closed 2x10 sec  · 10 head turns  · 10 head nods    Supine ex's:   ·  SLR with 1# 2 x 10 - NP  · SAQ with 3# 2 x 10 - NP  · Hooklying Hip abdER with BTB 2 x 10 - NP  · Bridging 2 x 10 - NP    Machines:    · Shuttle leg press with 2.5bands (B) 2 x 10 - NP  - Heel raises on shuttle with 1 black band 2 x 10 - NP      Jerman received the following manual therapy techniques: Joint mobilizations were applied to the: (R) ankle for 18 minutes, including:    Posterior TC glide III-IV  Anterior TC glide Gr III-IV  TC distraction Gr III-IIII  Subtalar supination/ pronation mobilization Gr   Plantar/dorsal glides to metatarsal 2-4 Gr II-III  Plantar glide for 1st MTP extension Gr II-III     Applied cold pack to R ankle for 15 min post treatment -- NP    Patient to apply cryotherapy at home.     Written Home Exercises Provided:  Patient educated to continue with previously issued HEP to tolerance adding balance work and monitoring swelling in the knee.    Exercises were reviewed and Jerman was able to demonstrate them prior to the end of the session.  Jerman demonstrated good  understanding of the education provided.     Assessment     Pt presents with no assistive device wearing slip on shoes. Pt continues to demonstrate decreased TC mobility in both DF and PF. Pt was educated that he is unable to achieve terminal knee  extension during the gait cycle due to restrictions in ankle mobility which could be contributing to the knee swelling and soreness, pt educated only to use walking boot if he is having foot pain and the walking boot would not help his knee pain. Pt demonstrates improved balance and intrinsic foot control, minimal arch noted in standing. Pt was educated to emphasize ROM exercises for home program.      Goals:  Short Term Goals: 3 weeks   1. Pt will demonstrate a 5 degree increase in ankle dorsiflexion bilat - MET  2. Pt will be able to ambulate with walking boot and without any assistive device reporting no pain - progressing  3. Pt will be able to tolerate SLS for 10 seconds on R LE without LOB - Progressing     Long Term Goals: 10 weeks   1. Pt will demonstrate symmetrical ankle strength at least 4+/5 in all directions  2. Pt will be able to walk for 1 mile reporting no pian in the R foot and ankle   3. Pt will be independent in home exercises/maintenance routine    New/updated goals:    1. Pt will be able to walk without assistive device or any loss of balance within 2 weeks  2. Pt will be able to ascend/ descend 1 flight of stairs reporting <2/10 pain within 6 weeks to demonstrate improved ankle ROM / function     Plan     Continue PT towards established plan of care and goals.     Alvino Hung, PT

## 2018-10-29 ENCOUNTER — OFFICE VISIT (OUTPATIENT)
Dept: INTERNAL MEDICINE | Facility: CLINIC | Age: 68
End: 2018-10-29
Attending: INTERNAL MEDICINE
Payer: MEDICARE

## 2018-10-29 VITALS
OXYGEN SATURATION: 99 % | HEART RATE: 69 BPM | WEIGHT: 210 LBS | SYSTOLIC BLOOD PRESSURE: 112 MMHG | DIASTOLIC BLOOD PRESSURE: 70 MMHG | HEIGHT: 71 IN | BODY MASS INDEX: 29.4 KG/M2

## 2018-10-29 DIAGNOSIS — E03.9 HYPOTHYROIDISM, UNSPECIFIED TYPE: ICD-10-CM

## 2018-10-29 DIAGNOSIS — D50.8 OTHER IRON DEFICIENCY ANEMIA: ICD-10-CM

## 2018-10-29 DIAGNOSIS — I10 ESSENTIAL HYPERTENSION: Primary | ICD-10-CM

## 2018-10-29 DIAGNOSIS — Z12.5 SCREENING FOR PROSTATE CANCER: ICD-10-CM

## 2018-10-29 DIAGNOSIS — R79.89 OTHER SPECIFIED ABNORMAL FINDINGS OF BLOOD CHEMISTRY: ICD-10-CM

## 2018-10-29 DIAGNOSIS — E78.9 DISORDER OF LIPID METABOLISM: ICD-10-CM

## 2018-10-29 DIAGNOSIS — E55.9 VITAMIN D DEFICIENCY: ICD-10-CM

## 2018-10-29 DIAGNOSIS — E78.00 HIGH CHOLESTEROL: ICD-10-CM

## 2018-10-29 DIAGNOSIS — D51.0 PERNICIOUS ANEMIA: ICD-10-CM

## 2018-10-29 PROCEDURE — G0008 ADMIN INFLUENZA VIRUS VAC: HCPCS | Mod: S$GLB,,, | Performed by: INTERNAL MEDICINE

## 2018-10-29 PROCEDURE — 99213 OFFICE O/P EST LOW 20 MIN: CPT | Mod: 25,S$GLB,, | Performed by: INTERNAL MEDICINE

## 2018-10-29 PROCEDURE — 90662 IIV NO PRSV INCREASED AG IM: CPT | Mod: S$GLB,,, | Performed by: INTERNAL MEDICINE

## 2018-10-29 PROCEDURE — 3078F DIAST BP <80 MM HG: CPT | Mod: CPTII,S$GLB,, | Performed by: INTERNAL MEDICINE

## 2018-10-29 PROCEDURE — 3074F SYST BP LT 130 MM HG: CPT | Mod: CPTII,S$GLB,, | Performed by: INTERNAL MEDICINE

## 2018-10-29 NOTE — PROGRESS NOTES
Subjective:       Patient ID: Jerman Miguel is a 68 y.o. male.    Chief Complaint: Follow-up (6 month)    Hypertension   This is a chronic problem. The current episode started more than 1 year ago. The problem is controlled.     Review of Systems   Constitutional: Negative.    Respiratory: Negative.    Cardiovascular: Negative.    Musculoskeletal: Positive for arthralgias.       Objective:      Physical Exam   Constitutional: He appears well-developed and well-nourished.   HENT:   Head: Normocephalic and atraumatic.   Eyes: Pupils are equal, round, and reactive to light.   Cardiovascular: Normal rate, regular rhythm and normal heart sounds.   Pulmonary/Chest: Effort normal.   Musculoskeletal: He exhibits no edema.        Right knee: He exhibits swelling.        Right ankle: He exhibits decreased range of motion.   Right foot in walking boot.   Neurological: He is alert.       Assessment:       1. Essential hypertension    2. High cholesterol        Plan:       Per orders and D/C instructions.  Continue meds/diet for HTN, and high cholest. which are stable.     Check labs.

## 2018-11-01 LAB
1,25(OH)2D SERPL-MCNC: 39 PG/ML (ref 18–72)
1,25(OH)2D2 SERPL-MCNC: <8 PG/ML
1,25(OH)2D3 SERPL-MCNC: 39 PG/ML
ALBUMIN SERPL-MCNC: 4.4 G/DL (ref 3.6–5.1)
ALBUMIN/GLOB SERPL: 1.5 (CALC) (ref 1–2.5)
ALP SERPL-CCNC: 68 U/L (ref 40–115)
ALT SERPL-CCNC: 11 U/L (ref 9–46)
AST SERPL-CCNC: 14 U/L (ref 10–35)
BASOPHILS # BLD AUTO: 69 CELLS/UL (ref 0–200)
BASOPHILS NFR BLD AUTO: 1 %
BILIRUB SERPL-MCNC: 0.7 MG/DL (ref 0.2–1.2)
BUN SERPL-MCNC: 17 MG/DL (ref 7–25)
BUN/CREAT SERPL: NORMAL (CALC) (ref 6–22)
CALCIUM SERPL-MCNC: 9.7 MG/DL (ref 8.6–10.3)
CHLORIDE SERPL-SCNC: 101 MMOL/L (ref 98–110)
CHOLEST SERPL-MCNC: 244 MG/DL
CHOLEST/HDLC SERPL: 6.3 (CALC)
CO2 SERPL-SCNC: 27 MMOL/L (ref 20–32)
CREAT SERPL-MCNC: 1.12 MG/DL (ref 0.7–1.25)
EOSINOPHIL # BLD AUTO: 90 CELLS/UL (ref 15–500)
EOSINOPHIL NFR BLD AUTO: 1.3 %
ERYTHROCYTE [DISTWIDTH] IN BLOOD BY AUTOMATED COUNT: 13.1 % (ref 11–15)
GFR SERPL CREATININE-BSD FRML MDRD: 67 ML/MIN/1.73M2
GLOBULIN SER CALC-MCNC: 2.9 G/DL (CALC) (ref 1.9–3.7)
GLUCOSE SERPL-MCNC: 92 MG/DL (ref 65–99)
HBA1C MFR BLD: 5.4 % OF TOTAL HGB
HCT VFR BLD AUTO: 42.6 % (ref 38.5–50)
HDLC SERPL-MCNC: 39 MG/DL
HGB BLD-MCNC: 14.4 G/DL (ref 13.2–17.1)
LDLC SERPL CALC-MCNC: 182 MG/DL (CALC)
LYMPHOCYTES # BLD AUTO: 1304 CELLS/UL (ref 850–3900)
LYMPHOCYTES NFR BLD AUTO: 18.9 %
MCH RBC QN AUTO: 29.4 PG (ref 27–33)
MCHC RBC AUTO-ENTMCNC: 33.8 G/DL (ref 32–36)
MCV RBC AUTO: 87.1 FL (ref 80–100)
MONOCYTES # BLD AUTO: 552 CELLS/UL (ref 200–950)
MONOCYTES NFR BLD AUTO: 8 %
NEUTROPHILS # BLD AUTO: 4885 CELLS/UL (ref 1500–7800)
NEUTROPHILS NFR BLD AUTO: 70.8 %
NONHDLC SERPL-MCNC: 205 MG/DL (CALC)
PLATELET # BLD AUTO: 180 THOUSAND/UL (ref 140–400)
PMV BLD REES-ECKER: 12.5 FL (ref 7.5–12.5)
POTASSIUM SERPL-SCNC: 4.6 MMOL/L (ref 3.5–5.3)
PROT SERPL-MCNC: 7.3 G/DL (ref 6.1–8.1)
PSA SERPL-MCNC: 1.5 NG/ML
RBC # BLD AUTO: 4.89 MILLION/UL (ref 4.2–5.8)
SODIUM SERPL-SCNC: 138 MMOL/L (ref 135–146)
TRIGL SERPL-MCNC: 105 MG/DL
TSH SERPL-ACNC: 0.93 MIU/L (ref 0.4–4.5)
VIT B12 SERPL-MCNC: 624 PG/ML (ref 200–1100)
WBC # BLD AUTO: 6.9 THOUSAND/UL (ref 3.8–10.8)

## 2018-12-27 DIAGNOSIS — I10 ESSENTIAL HYPERTENSION: ICD-10-CM

## 2018-12-27 RX ORDER — LISINOPRIL AND HYDROCHLOROTHIAZIDE 10; 12.5 MG/1; MG/1
TABLET ORAL
Qty: 90 TABLET | Refills: 3 | Status: SHIPPED | OUTPATIENT
Start: 2018-12-27 | End: 2019-04-29

## 2019-01-10 ENCOUNTER — DOCUMENTATION ONLY (OUTPATIENT)
Dept: REHABILITATION | Facility: HOSPITAL | Age: 69
End: 2019-01-10

## 2019-01-10 NOTE — PROGRESS NOTES
PHYSICAL THERAPY DISCHARGE SUMMARY     Name: Jerman Miguel  Clinic Number: [id number]  Physician: No ref. provider found  Treatment Orders: PT Eval and Treat dislocation of tarsometatarsal joints of R foot, s/p ORIF  Past Medical History:   Diagnosis Date    Arthritis of right foot 10/5/2016    HTN (hypertension) 12/2/2014 2014       Initial visit: 7/13/18  Date of Last visit: 8/15/18  Date of Discharge Note:  01/10/2019   Total Visits Received: 11    ASSESSMENT   Status Towards Goals Met:  Not met due to non-compliance    Goals Not achieved and why: see above    Discharge reason : Pt has not re-scheduled further follow-up sessions      PLAN   This patient is discharged from Physical Therapy Services.     .

## 2019-04-29 ENCOUNTER — OFFICE VISIT (OUTPATIENT)
Dept: INTERNAL MEDICINE | Facility: CLINIC | Age: 69
End: 2019-04-29
Attending: INTERNAL MEDICINE
Payer: MEDICARE

## 2019-04-29 VITALS
HEIGHT: 71 IN | DIASTOLIC BLOOD PRESSURE: 80 MMHG | SYSTOLIC BLOOD PRESSURE: 120 MMHG | HEART RATE: 62 BPM | BODY MASS INDEX: 28 KG/M2 | WEIGHT: 200 LBS | OXYGEN SATURATION: 99 %

## 2019-04-29 DIAGNOSIS — R52 PAIN: ICD-10-CM

## 2019-04-29 DIAGNOSIS — E78.00 HIGH CHOLESTEROL: ICD-10-CM

## 2019-04-29 DIAGNOSIS — Z00.00 ROUTINE ADULT HEALTH MAINTENANCE: ICD-10-CM

## 2019-04-29 DIAGNOSIS — Z13.39 SCREENING FOR ALCOHOLISM: ICD-10-CM

## 2019-04-29 DIAGNOSIS — Z13.31 SCREENING FOR DEPRESSION: ICD-10-CM

## 2019-04-29 DIAGNOSIS — R42 DIZZY SPELLS: ICD-10-CM

## 2019-04-29 DIAGNOSIS — I10 ESSENTIAL HYPERTENSION: Primary | ICD-10-CM

## 2019-04-29 PROCEDURE — 90670 PCV13 VACCINE IM: CPT | Mod: S$GLB,,, | Performed by: INTERNAL MEDICINE

## 2019-04-29 PROCEDURE — G0444 PR DEPRESSION SCREENING: ICD-10-PCS | Mod: 59,S$GLB,, | Performed by: INTERNAL MEDICINE

## 2019-04-29 PROCEDURE — 3074F PR MOST RECENT SYSTOLIC BLOOD PRESSURE < 130 MM HG: ICD-10-PCS | Mod: CPTII,S$GLB,, | Performed by: INTERNAL MEDICINE

## 2019-04-29 PROCEDURE — G0442 ANNUAL ALCOHOL SCREEN 15 MIN: HCPCS | Mod: 59,S$GLB,, | Performed by: INTERNAL MEDICINE

## 2019-04-29 PROCEDURE — 99499 UNLISTED E&M SERVICE: CPT | Mod: S$GLB,,, | Performed by: INTERNAL MEDICINE

## 2019-04-29 PROCEDURE — 3074F SYST BP LT 130 MM HG: CPT | Mod: CPTII,S$GLB,, | Performed by: INTERNAL MEDICINE

## 2019-04-29 PROCEDURE — 3079F DIAST BP 80-89 MM HG: CPT | Mod: CPTII,S$GLB,, | Performed by: INTERNAL MEDICINE

## 2019-04-29 PROCEDURE — 99214 OFFICE O/P EST MOD 30 MIN: CPT | Mod: 25,S$GLB,, | Performed by: INTERNAL MEDICINE

## 2019-04-29 PROCEDURE — G0444 DEPRESSION SCREEN ANNUAL: HCPCS | Mod: 59,S$GLB,, | Performed by: INTERNAL MEDICINE

## 2019-04-29 PROCEDURE — G0009 ADMIN PNEUMOCOCCAL VACCINE: HCPCS | Mod: S$GLB,,, | Performed by: INTERNAL MEDICINE

## 2019-04-29 PROCEDURE — 3079F PR MOST RECENT DIASTOLIC BLOOD PRESSURE 80-89 MM HG: ICD-10-PCS | Mod: CPTII,S$GLB,, | Performed by: INTERNAL MEDICINE

## 2019-04-29 PROCEDURE — 99214 PR OFFICE/OUTPT VISIT, EST, LEVL IV, 30-39 MIN: ICD-10-PCS | Mod: 25,S$GLB,, | Performed by: INTERNAL MEDICINE

## 2019-04-29 PROCEDURE — 90670 PNEUMOCOCCAL CONJUGATE VACCINE 13-VALENT LESS THAN 5YO & GREATER THAN: ICD-10-PCS | Mod: S$GLB,,, | Performed by: INTERNAL MEDICINE

## 2019-04-29 PROCEDURE — G0009 PNEUMOCOCCAL CONJUGATE VACCINE 13-VALENT LESS THAN 5YO & GREATER THAN: ICD-10-PCS | Mod: S$GLB,,, | Performed by: INTERNAL MEDICINE

## 2019-04-29 PROCEDURE — G0442 PR  ALCOHOL SCREENING: ICD-10-PCS | Mod: 59,S$GLB,, | Performed by: INTERNAL MEDICINE

## 2019-04-29 PROCEDURE — 99499 RISK ADDL DX/OHS AUDIT: ICD-10-PCS | Mod: S$GLB,,, | Performed by: INTERNAL MEDICINE

## 2019-04-29 RX ORDER — LISINOPRIL 10 MG/1
10 TABLET ORAL DAILY
Qty: 90 TABLET | Refills: 3 | Status: SHIPPED | OUTPATIENT
Start: 2019-04-29 | End: 2020-05-04

## 2019-04-29 RX ORDER — MELOXICAM 15 MG/1
TABLET ORAL
Qty: 90 TABLET | Refills: 1 | Status: SHIPPED | OUTPATIENT
Start: 2019-04-29 | End: 2019-11-15 | Stop reason: SDUPTHER

## 2019-04-29 NOTE — PROGRESS NOTES
Subjective:       Patient ID: Jerman Miguel is a 68 y.o. male.    Chief Complaint: Follow-up (6 month) and Dizziness (thinks it may be the HCTZ)    Gets dizzy with standing, leta in the morning. Wants to stop HCTZ.  Lost 10 lb.    Dizziness:   Chronicity:  New  Onset:  More than 1 month ago  Progression since onset:  Unchanged    Review of Systems   Constitutional: Negative.    Respiratory: Negative.    Cardiovascular: Negative.    Neurological: Positive for dizziness.   Psychiatric/Behavioral: Negative for dysphoric mood.       Objective:      Physical Exam   Constitutional: He appears well-developed and well-nourished.   HENT:   Head: Normocephalic and atraumatic.   Eyes: Pupils are equal, round, and reactive to light.   Cardiovascular: Normal rate, regular rhythm and normal heart sounds.   Pulmonary/Chest: Effort normal.   Musculoskeletal: He exhibits no edema.        Right knee: He exhibits swelling.        Right ankle: He exhibits decreased range of motion.   Right foot in walking boot.   Neurological: He is alert.       Assessment:       1. Essential hypertension    2. High cholesterol    3. Pain    4. Routine adult health maintenance    5. Screening for depression    6. Screening for alcoholism    7. Dizzy spells        Plan:       Per orders and D/C instructions.     Meloxicam for arthritis.  D/C HCTZ due to dizziness and cont Lisinopril for HTN.  LFD for high cholest.    Screening: The patient was screened for depression with the PHQ2 questionnaire and possible health consequences were discussed with the patient, who understands (15 minutes spent). The patient was screened for the misuse of alcohol, by asking the number of drinks per average week, and if pt has had more than 4 drinks (more than 3 for women and elderly) in 1 day within the past year. The health and legal consequences of misuse were discussed (15 minutes spent). The patient was screened for obesity (BMI>30), If the current BMI > 30,  then the possible consequences of obesity, as well as the benefits of diet, exercise, and weight loss were discussed. Any behavioral risks were identified, and methods to achieve appropriate treatment goals were discussed (15 minutes spent).

## 2019-08-28 ENCOUNTER — HOSPITAL ENCOUNTER (OUTPATIENT)
Dept: RADIOLOGY | Facility: HOSPITAL | Age: 69
Discharge: HOME | End: 2019-08-28
Attending: PODIATRIST
Payer: MEDICARE

## 2019-08-28 ENCOUNTER — OFFICE VISIT (OUTPATIENT)
Dept: PODIATRY | Facility: CLINIC | Age: 69
End: 2019-08-28
Payer: MEDICARE

## 2019-08-28 VITALS
BODY MASS INDEX: 28.98 KG/M2 | WEIGHT: 207 LBS | SYSTOLIC BLOOD PRESSURE: 150 MMHG | HEART RATE: 70 BPM | DIASTOLIC BLOOD PRESSURE: 86 MMHG | HEIGHT: 71 IN

## 2019-08-28 DIAGNOSIS — M21.41 PES PLANUS OF BOTH FEET: ICD-10-CM

## 2019-08-28 DIAGNOSIS — M21.42 PES PLANUS OF BOTH FEET: ICD-10-CM

## 2019-08-28 DIAGNOSIS — M19.072 PRIMARY OSTEOARTHRITIS OF BOTH ANKLES: ICD-10-CM

## 2019-08-28 DIAGNOSIS — M19.071 PRIMARY OSTEOARTHRITIS OF BOTH ANKLES: ICD-10-CM

## 2019-08-28 DIAGNOSIS — M19.072 PRIMARY OSTEOARTHRITIS OF BOTH ANKLES: Primary | ICD-10-CM

## 2019-08-28 DIAGNOSIS — M19.071 PRIMARY OSTEOARTHRITIS OF BOTH ANKLES: Primary | ICD-10-CM

## 2019-08-28 PROCEDURE — 3079F PR MOST RECENT DIASTOLIC BLOOD PRESSURE 80-89 MM HG: ICD-10-PCS | Mod: CPTII,S$GLB,, | Performed by: PODIATRIST

## 2019-08-28 PROCEDURE — 73610 XR ANKLE COMPLETE 3 VIEW BILATERAL: ICD-10-PCS | Mod: 26,50,, | Performed by: RADIOLOGY

## 2019-08-28 PROCEDURE — 20605 PR DRAIN/INJECT INTERMEDIATE JOINT/BURSA: ICD-10-PCS | Mod: LT,S$GLB,, | Performed by: PODIATRIST

## 2019-08-28 PROCEDURE — 73610 X-RAY EXAM OF ANKLE: CPT | Mod: 50,TC

## 2019-08-28 PROCEDURE — 73610 X-RAY EXAM OF ANKLE: CPT | Mod: 26,50,, | Performed by: RADIOLOGY

## 2019-08-28 PROCEDURE — 3079F DIAST BP 80-89 MM HG: CPT | Mod: CPTII,S$GLB,, | Performed by: PODIATRIST

## 2019-08-28 PROCEDURE — 99999 PR PBB SHADOW E&M-EST. PATIENT-LVL III: ICD-10-PCS | Mod: PBBFAC,,, | Performed by: PODIATRIST

## 2019-08-28 PROCEDURE — 20605 DRAIN/INJ JOINT/BURSA W/O US: CPT | Mod: LT,S$GLB,, | Performed by: PODIATRIST

## 2019-08-28 PROCEDURE — 3077F PR MOST RECENT SYSTOLIC BLOOD PRESSURE >= 140 MM HG: ICD-10-PCS | Mod: CPTII,S$GLB,, | Performed by: PODIATRIST

## 2019-08-28 PROCEDURE — 99203 OFFICE O/P NEW LOW 30 MIN: CPT | Mod: 25,S$GLB,, | Performed by: PODIATRIST

## 2019-08-28 PROCEDURE — 99203 PR OFFICE/OUTPT VISIT, NEW, LEVL III, 30-44 MIN: ICD-10-PCS | Mod: 25,S$GLB,, | Performed by: PODIATRIST

## 2019-08-28 PROCEDURE — 3077F SYST BP >= 140 MM HG: CPT | Mod: CPTII,S$GLB,, | Performed by: PODIATRIST

## 2019-08-28 PROCEDURE — 99999 PR PBB SHADOW E&M-EST. PATIENT-LVL III: CPT | Mod: PBBFAC,,, | Performed by: PODIATRIST

## 2019-08-28 RX ORDER — DEXAMETHASONE SODIUM PHOSPHATE 4 MG/ML
4 INJECTION, SOLUTION INTRA-ARTICULAR; INTRALESIONAL; INTRAMUSCULAR; INTRAVENOUS; SOFT TISSUE
Status: COMPLETED | OUTPATIENT
Start: 2019-08-28 | End: 2019-08-28

## 2019-08-28 RX ADMIN — DEXAMETHASONE SODIUM PHOSPHATE 4 MG: 4 INJECTION, SOLUTION INTRA-ARTICULAR; INTRALESIONAL; INTRAMUSCULAR; INTRAVENOUS; SOFT TISSUE at 08:08

## 2019-08-28 NOTE — PROGRESS NOTES
Subjective:      Patient ID: Jerman Miguel is a 68 y.o. male.    Chief Complaint: Foot Pain (lt more than thr right )    Jerman is a 68 y.o. male who presents to the podiatry clinic  with complaint of  bilateral foot pain. Onset of the symptoms was several months ago. Precipitating event: none known. Current symptoms include: ability to bear weight, but with some pain. Aggravating factors: standing. Symptoms have gradually worsened. Patient has had no prior foot problems. Evaluation to date: none. Treatment to date: avoidance of offending activity. Patients rates pain 8/10 on pain scale.        Review of Systems   Constitution: Negative for chills and fever.   HENT: Negative for congestion and tinnitus.    Eyes: Negative for double vision and visual disturbance.   Cardiovascular: Negative for chest pain and claudication.   Respiratory: Negative for hemoptysis and shortness of breath.    Endocrine: Negative for cold intolerance and heat intolerance.   Hematologic/Lymphatic: Negative for adenopathy and bleeding problem.   Skin: Positive for dry skin. Negative for color change and nail changes.   Musculoskeletal: Positive for arthritis, joint pain and joint swelling. Negative for myalgias and stiffness.   Gastrointestinal: Negative for nausea and vomiting.   Genitourinary: Negative for dysuria and hematuria.   Neurological: Negative for numbness and paresthesias.   Psychiatric/Behavioral: Negative for altered mental status and suicidal ideas.   Allergic/Immunologic: Negative for environmental allergies and persistent infections.           Objective:      Physical Exam   Constitutional: He is oriented to person, place, and time. Vital signs are normal. He appears well-developed and well-nourished.   Cardiovascular:   Pulses:       Dorsalis pedis pulses are 2+ on the right side, and 2+ on the left side.        Posterior tibial pulses are 2+ on the right side, and 2+ on the left side.   Musculoskeletal:         Right foot: There is normal range of motion and no deformity.        Left foot: There is normal range of motion and no deformity.   Inspection and palpation of the muscles joints and bones of both lower extremities reveal that muscle strength for the anterior, lateral, and posterior muscle groups and intrinsic muscle groups of the foot are all 5 over 5 symmetrical.  Excessive pronation noted bilaterally with calcaneal valgus.  Tenderness to bilateral posterior tibial tendon insertion sites as well as tenderness to the subtalar joint/sinus tarsi left greater than right.   Feet:   Right Foot:   Skin Integrity: Negative for skin breakdown or erythema.   Left Foot:   Skin Integrity: Negative for skin breakdown or erythema.   Neurological: He is oriented to person, place, and time. He has normal strength. No sensory deficit.   Reflex Scores:       Patellar reflexes are 2+ on the right side and 2+ on the left side.       Achilles reflexes are 2+ on the right side and 2+ on the left side.  Sharp, dull, light touch, vibratory, and proprioceptive sensation are intact bilaterally. Deep tendon reflexes to patellar and Achilles tendon are symmetrical, 2/4 bilaterally. No ankle clonus or Babinski reflexes noted bilaterally. Coordination is normal to both feet and lower extremities.   Skin: Skin is warm, dry and intact. No cyanosis. Nails show no clubbing.   Skin turgor is normal bilaterally. Skin texture is well hydrated to both lower extremities. No lesions or rashes or wounds appreciated bilaterally. Nail plates 1 through 5 bilaterally are within normal limits for length and thickness. No nail clubbing or incurvation noted.             Assessment:       Encounter Diagnoses   Name Primary?    Primary osteoarthritis of both ankles Yes    Pes planus of both feet          Plan:       Jerman was seen today for foot pain.    Diagnoses and all orders for this visit:    Primary osteoarthritis of both ankles  -     X-Ray Ankle  Complete Bilateral; Future  -     ORTHOTIC DEVICE (DME)    Pes planus of both feet      I counseled the patient on his conditions, their implications and medical management.      A steroid injection was performed at left subtalar joint/sinus tarsi using 1% plain Lidocaine and 1 mL of dexamethasone. This was well tolerated.    Radiographs ordered bilateral ankle joint, prescription orthotics dispensed today, follow up in 2 months.    .

## 2019-09-08 ENCOUNTER — OFFICE VISIT (OUTPATIENT)
Dept: URGENT CARE | Facility: CLINIC | Age: 69
End: 2019-09-08
Payer: MEDICARE

## 2019-09-08 VITALS
BODY MASS INDEX: 28.98 KG/M2 | TEMPERATURE: 98 F | HEART RATE: 71 BPM | HEIGHT: 71 IN | DIASTOLIC BLOOD PRESSURE: 72 MMHG | RESPIRATION RATE: 20 BRPM | WEIGHT: 207 LBS | SYSTOLIC BLOOD PRESSURE: 117 MMHG | OXYGEN SATURATION: 98 %

## 2019-09-08 DIAGNOSIS — T78.40XA ALLERGIC REACTION, INITIAL ENCOUNTER: Primary | ICD-10-CM

## 2019-09-08 DIAGNOSIS — R22.0 LIP SWELLING: ICD-10-CM

## 2019-09-08 PROCEDURE — 99214 PR OFFICE/OUTPT VISIT, EST, LEVL IV, 30-39 MIN: ICD-10-PCS | Mod: 25,S$GLB,, | Performed by: FAMILY MEDICINE

## 2019-09-08 PROCEDURE — 3074F SYST BP LT 130 MM HG: CPT | Mod: CPTII,S$GLB,, | Performed by: FAMILY MEDICINE

## 2019-09-08 PROCEDURE — 3074F PR MOST RECENT SYSTOLIC BLOOD PRESSURE < 130 MM HG: ICD-10-PCS | Mod: CPTII,S$GLB,, | Performed by: FAMILY MEDICINE

## 2019-09-08 PROCEDURE — 3078F DIAST BP <80 MM HG: CPT | Mod: CPTII,S$GLB,, | Performed by: FAMILY MEDICINE

## 2019-09-08 PROCEDURE — 99214 OFFICE O/P EST MOD 30 MIN: CPT | Mod: 25,S$GLB,, | Performed by: FAMILY MEDICINE

## 2019-09-08 PROCEDURE — 3078F PR MOST RECENT DIASTOLIC BLOOD PRESSURE < 80 MM HG: ICD-10-PCS | Mod: CPTII,S$GLB,, | Performed by: FAMILY MEDICINE

## 2019-09-08 PROCEDURE — 96372 THER/PROPH/DIAG INJ SC/IM: CPT | Mod: S$GLB,,, | Performed by: FAMILY MEDICINE

## 2019-09-08 PROCEDURE — 96372 PR INJECTION,THERAP/PROPH/DIAG2ST, IM OR SUBCUT: ICD-10-PCS | Mod: S$GLB,,, | Performed by: FAMILY MEDICINE

## 2019-09-08 RX ORDER — EPINEPHRINE 0.3 MG/.3ML
1 INJECTION SUBCUTANEOUS ONCE
Qty: 1 DEVICE | Refills: 0 | Status: SHIPPED | OUTPATIENT
Start: 2019-09-08 | End: 2021-03-09

## 2019-09-08 RX ORDER — PREDNISONE 10 MG/1
TABLET ORAL
Qty: 15 TABLET | Refills: 0 | Status: SHIPPED | OUTPATIENT
Start: 2019-09-08 | End: 2019-09-15

## 2019-09-08 NOTE — PROGRESS NOTES
"Subjective:       Patient ID: Jerman Miguel is a 69 y.o. male.    Vitals:  height is 5' 11" (1.803 m) and weight is 93.9 kg (207 lb). His temperature is 98.2 °F (36.8 °C). His blood pressure is 117/72 and his pulse is 71. His respiration is 20 and oxygen saturation is 98%.     Chief Complaint: Allergic Reaction    Pt states he was eating chicken from a place every day before.  Very shortly after started to feel lip swelling and the back of his tongue swelling. He had redness, itching and hives to the face and arms. Pt took 2 benadryls prior to coming in.  Pt has no other known food allergies.  States the redness and itching to the face and arm significantly better no more swelling to the back of his tongue, just mild upper lip swelling.  Never had wheezing or shortness of breath - never had trouble swallowing.  He has had reactions like this before when eating out that have resolved with Benadryl never had a reaction this long.  Has not had allergy testing before. No other new contacts that he thinks could have caused the reaction. Overall reports sx significantly improved.   Patient does take lisinopril he has been taking it for the last 4 years    Allergic Reaction   This is a new problem. The current episode started today (1ha ago). The problem is unchanged. The problem is mild. Associated with: chicken. The time of exposure was just prior to onset. The exposure occurred at home. Associated symptoms include a rash. Pertinent negatives include no chest pain, coughing, diarrhea or vomiting. Treatments tried: benadryl. The treatment provided mild relief. There is no history of seasonal allergies. Swelling is present on the tongue, lips and throat.       Constitution: Negative for chills, fatigue and fever.   HENT: Negative for congestion and sore throat.         Lip swelling   Neck: Negative for painful lymph nodes.   Cardiovascular: Negative for chest pain and leg swelling.   Eyes: Negative for double vision " and blurred vision.   Respiratory: Negative for cough and shortness of breath.    Gastrointestinal: Negative for nausea, vomiting and diarrhea.   Genitourinary: Negative for dysuria, frequency and urgency.   Musculoskeletal: Negative for joint pain, joint swelling, muscle cramps and muscle ache.   Skin: Positive for rash. Negative for color change and pale.   Allergic/Immunologic: Negative for seasonal allergies.   Neurological: Negative for dizziness, history of vertigo, light-headedness, passing out and headaches.   Hematologic/Lymphatic: Negative for swollen lymph nodes, easy bruising/bleeding and history of blood clots. Does not bruise/bleed easily.   Psychiatric/Behavioral: Negative for nervous/anxious, sleep disturbance and depression. The patient is not nervous/anxious.        Objective:      Physical Exam   Constitutional: He is oriented to person, place, and time. He appears well-developed and well-nourished. He is cooperative.  Non-toxic appearance. He does not appear ill. No distress.   HENT:   Head: Normocephalic and atraumatic.   Right Ear: Hearing, tympanic membrane, external ear and ear canal normal.   Left Ear: Hearing, tympanic membrane, external ear and ear canal normal.   Nose: Nose normal. No mucosal edema, rhinorrhea or nasal deformity. No epistaxis. Right sinus exhibits no maxillary sinus tenderness and no frontal sinus tenderness. Left sinus exhibits no maxillary sinus tenderness and no frontal sinus tenderness.   Mouth/Throat: Uvula is midline, oropharynx is clear and moist and mucous membranes are normal. No trismus in the jaw. Normal dentition. No uvula swelling. No posterior oropharyngeal edema or posterior oropharyngeal erythema.       Eyes: Conjunctivae and lids are normal. No scleral icterus.   Sclera clear bilat   Neck: Trachea normal, full passive range of motion without pain and phonation normal. Neck supple.   Cardiovascular: Normal rate, regular rhythm, normal heart sounds, intact  distal pulses and normal pulses.   Pulmonary/Chest: Effort normal and breath sounds normal. No stridor. No respiratory distress. He has no wheezes.   Patient in no acute distress able to speaking clear complete sentences no stridor   Abdominal: Soft. Normal appearance and bowel sounds are normal. He exhibits no distension. There is no tenderness.   Musculoskeletal: Normal range of motion. He exhibits no edema or deformity.   Neurological: He is alert and oriented to person, place, and time. He exhibits normal muscle tone. Coordination normal.   Skin: Skin is warm, dry and intact. He is not diaphoretic. No pallor.   Some redness to the arms   Psychiatric: He has a normal mood and affect. His speech is normal and behavior is normal. Judgment and thought content normal. Cognition and memory are normal.   Nursing note and vitals reviewed.      Assessment:       1. Allergic reaction, initial encounter    2. Lip swelling        Plan:         Allergic reaction, initial encounter  -     Discontinue: methylPREDNISolone sod suc(PF) injection 125 mg  -     methylPREDNISolone sod suc(PF) injection 125 mg    Lip swelling    PT HAD IMPROVEMENT IN THE LIP SWELLING AFTER SOLUMEDROL INJECTION  I do not feel the lisinopril cause of this as he had improvement with benadryl and there was hives on the arms and face, and he has had similar rxn with certain foods. Advised lisinopril can be a cause of lip/tongue swelling if he has these sx in the future that he does not think is related to food. Gave epi pen to fill and have with him if needed. If he does use go to the ER after. Advised f/u with allergy for testing  Will give prednisone taper to start tomorrow and he needs to monitor his bp. Advised antihistamine regimen for the next few days at home    Patient Instructions   PLEASE READ YOUR DISCHARGE INSTRUCTIONS ENTIRELY AS IT CONTAINS IMPORTANT INFORMATION.      Please drink plenty of fluids.  Please get plenty of rest.  Please return  here or go to the Emergency Department for any concerns or worsening of condition (worsening lip swelling, difficulty swallowing, shortness of breath, passing out). Use the epi pen first    Please take over the counter Zantac or pepcid as directed for the next 24-72hours as needed.    If you were given a steroid shot in the clinic and have also been given a prescription for a steroid such as Prednisone or a Medrol Dose Pack, please begin taking them tomorrow.    You received a steroid today - this can elevate your blood pressure, elevate your blood sugar, water weight gain, nervous energy, redness to the face and dimpling of the skin where the shot goes in if you had an injection.   Do not use steroids more than 3 times per year.   If you have diabetes, please check you blood sugar frequently.  If you have high blood pressure, please check your blood pressure frequently.     Please take an over the counter antihistamine medication (allegra/Claritin/Zyrtec) of your choice as directed.  Benadryl at night - may make you drowsy do not drive after    Please follow up with an allergist for specific allergen testing  A referral to allergy has been placed for you.  Please call [phone] to make an appt      If you  smoke, please stop smoking.    Please arrange follow up with your primary medical clinic as soon as possible. You must understand that you've received an Urgent Care treatment only and that you may be released before all of your medical problems are known or treated. You, the patient, will arrange for follow up as instructed. If your symptoms worsen or fail to improve you should go to the Emergency Room.    General Allergic Reactions  An allergic reaction is a set of symptoms caused by an allergen. An allergen is something that causes a persons immune system to react. When a person comes in contact with an allergen, it causes the body to release chemicals. These include the chemical histamine. Histamine  causes swelling and itching. It may affect the entire body. This is called a general allergic reaction. Often symptoms affect only 1 part of the body. This is called a local allergic reaction.  You are having an allergic reaction. Almost anything can cause one. Different people are allergic to different things. It is usually something that you ate or swallowed, came into contact with by getting or putting it on your skin or clothes, or something you breathed in the air. This can be very annoying and sometimes scary.  Most of us think of allergic reactions when we have a rash or itchy skin. Symptoms can include:  · Itching of the eyes, nose, and roof of the mouth  · Runny or stuffy nose  · Watery eyes   · Sneezing or coughing   · A blocked feeling in the ear  · Red, itchy rash called hives  · Red and purple spots  · Rash, redness, welts, blisters  · Itching, burning, stinging, pain  · Dry, flaky, cracking, scaly skin  Severe symptoms include:  · Swelling of the face, lips, or other parts of the body  · Hoarse voice  · Trouble swallowing, feeling like your throat is closing  · Trouble breathing, wheezing  · Nausea, vomiting, diarrhea, stomach cramps  · Feeling faint or lightheaded, rapid heart rate  Sometimes the cause may be obvious. But there are so many things that can cause a reaction that you may not be able to figure out. The most important things to help find your allergen are:  · Remembering when it started  · What you were doing at the time or just before that  · Any activities you were involved in  · Any new products or contacts  Below are some common causes. But remember that almost anything can cause a reaction. You may not even be aware that you came into contact with one of these things:  · Dust, mold, pollen  · Plants (common ones are poison ivy and poison oak, but there are many others)   · Animals  · Foods such as shrimp, shellfish, peanuts, milk products, gluten, and eggs. Also food colorings,  flavorings, and additives.  · Insect bites or stings such as bees, mosquitos, fleas, ticks  · Medicines such as penicillin, sulfa medicines, amoxicillin, aspirin, and ibuprofen. But any medicine can cause a reaction.  · Jewelry such as nickel or gold. This can be new, or something youve worn for a while, including zippers and buttons.  · Latex such as in gloves, clothes, toys, balloons, or some tapes. Some people allergic to latex may also have problems with foods like bananas, avocados, kiwi, papaya, or chestnuts.  · Lotions, perfumes, cosmetics, soaps, shampoos, skincare products, nail products  · Chemicals or dyes in clothing, linen, , hair dyes, soaps, iodine  Many viruses and common colds can cause a rash that is not an allergic reaction. Sometimes it is hard to tell the difference between allergies, sensitivity, or an intolerance to something. This is especially true with food. Many things can cause diarrhea, vomiting, stomach cramps, and skin irritation.  Home care    The goal of treatment is to help relieve the symptoms and get you feeling better. The rash will usually fade over several days. But it can sometimes last a couple of weeks. Over the next couple of days, there may be times when it is gets a little worse, and then better again. Here are some things to do:  · If you know what you are allergic to, stay away from it. Future reactions could be worse than this one.  · Avoid tight clothing and anything that heats up your skin (hot showers or baths, direct sunlight). Heat will make itching worse.  · An ice pack will relieve local areas of intense itching and redness. To make an ice pack, put ice cubes in a plastic bag that seals at the top. Wrap it in a thin, clean towel. Dont put the ice directly on the skin because it can damage the skin.  · Oral diphenhydramine is an over-the-counter antihistamine sold at pharmacy and grocery stores. Unless a prescription antihistamine was given,  diphenhydramine may be used to reduce itching if large areas of the skin are involved. It may make you sleepy. So be careful using it in the daytime or when going to school, working, or driving. Note: Dont use diphenhydramine if you have glaucoma or if you are a man with trouble urinating due to an enlarged prostate. There are other antihistamines that wont make you so sleepy. These are good choices for daytime use. Ask your pharmacist for suggestions.  · Dont use diphenhydramine cream on your skin. It can cause a further reaction in some people.  · To help prevent an infection, don't scratch the affected area. Scratching may worsen the reaction and damage your skin. It can also lead to an infection. Always check the affected for signs of an infection.  · Call your healthcare provider and ask what you can use to help decrease the itching.  · To decrease allergic reactions, try the following:    · Use heat-steam to clean your home  · Use high-efficiency particulate (HEPA) vacuums and filters  · Stay away from food and pet triggers  · Kill any cockroaches  · Clean your house often  Follow-up care  Follow up with your healthcare provider, or as advised. If you had a severe reaction today, or if you have had several mild to medium allergic reactions in the past, ask your provider about allergy testing. This can help you find out what you are allergic to. If your reaction included dizziness, fainting, or trouble breathing or swallowing, ask your provider about carrying auto-injectable epinephrine.  Call 911  Call 911 if any of these occur:  · Trouble breathing or swallowing, wheezing  · Cool, moist, pale skin  · Shortness of breath  · Hoarse voice or trouble speaking  · Confused   · Very drowsy or trouble awakening  · Fainting or loss of consciousness  · Rapid heart rate  · Feeling of dizziness or weakness or a sudden drop in blood pressure  · Feeling of doom  · Feeling lightheaded  · Severe nausea or vomiting, or  diarrhea  · Seizure  · Swelling in the face, eyelids, lips, mouth, throat or tongue  · Drooling  When to seek medical advice  Call your healthcare provider right away if any of these occur:  · Spreading areas of itching, redness or swelling  · Nausea or stomach cramps or abdominal pain  · Continuing or recurring symptoms  · Spreading areas of redness, swelling, or itching  · Signs of infection at the affected site:  ¨ Spreading redness  ¨ Increased pain or swelling  ¨ Fluid or colored drainage from the site  ¨ Fever of 100.4°F (38°C) or above lasting for 24 to 48 hours, or as directed by your provider  Date Last Reviewed: 3/1/2017  © 6555-3872 [employer]. [address] [place] [address], [place] [address]. All rights reserved. This information is not intended as a substitute for professional medical care. Always follow your healthcare professional's instructions.

## 2019-09-08 NOTE — PATIENT INSTRUCTIONS
PLEASE READ YOUR DISCHARGE INSTRUCTIONS ENTIRELY AS IT CONTAINS IMPORTANT INFORMATION.      Please drink plenty of fluids.  Please get plenty of rest.  Please return here or go to the Emergency Department for any concerns or worsening of condition (worsening lip swelling, difficulty swallowing, shortness of breath, passing out). Use the epi pen first - outside of the thigh    Please take over the counter Zantac or pepcid as directed for the next 24-72hours as needed.    If you were given a steroid shot in the clinic and have also been given a prescription for a steroid such as Prednisone or a Medrol Dose Pack, please begin taking them tomorrow.    You received a steroid today - this can elevate your blood pressure, elevate your blood sugar, water weight gain, nervous energy, redness to the face and dimpling of the skin where the shot goes in if you had an injection.   Do not use steroids more than 3 times per year.   If you have diabetes, please check you blood sugar frequently.  If you have high blood pressure, please check your blood pressure frequently.     Please take an over the counter antihistamine medication (allegra/Claritin/Zyrtec) of your choice as directed.  Benadryl at night - may make you drowsy do not drive after    Please follow up with an allergist for specific allergen testing  A referral to allergy has been placed for you.  Please call [phone] to make an appt      If you  smoke, please stop smoking.    Please arrange follow up with your primary medical clinic as soon as possible. You must understand that you've received an Urgent Care treatment only and that you may be released before all of your medical problems are known or treated. You, the patient, will arrange for follow up as instructed. If your symptoms worsen or fail to improve you should go to the Emergency Room.    General Allergic Reactions  An allergic reaction is a set of symptoms caused by an allergen. An allergen is something  that causes a persons immune system to react. When a person comes in contact with an allergen, it causes the body to release chemicals. These include the chemical histamine. Histamine causes swelling and itching. It may affect the entire body. This is called a general allergic reaction. Often symptoms affect only 1 part of the body. This is called a local allergic reaction.  You are having an allergic reaction. Almost anything can cause one. Different people are allergic to different things. It is usually something that you ate or swallowed, came into contact with by getting or putting it on your skin or clothes, or something you breathed in the air. This can be very annoying and sometimes scary.  Most of us think of allergic reactions when we have a rash or itchy skin. Symptoms can include:  · Itching of the eyes, nose, and roof of the mouth  · Runny or stuffy nose  · Watery eyes   · Sneezing or coughing   · A blocked feeling in the ear  · Red, itchy rash called hives  · Red and purple spots  · Rash, redness, welts, blisters  · Itching, burning, stinging, pain  · Dry, flaky, cracking, scaly skin  Severe symptoms include:  · Swelling of the face, lips, or other parts of the body  · Hoarse voice  · Trouble swallowing, feeling like your throat is closing  · Trouble breathing, wheezing  · Nausea, vomiting, diarrhea, stomach cramps  · Feeling faint or lightheaded, rapid heart rate  Sometimes the cause may be obvious. But there are so many things that can cause a reaction that you may not be able to figure out. The most important things to help find your allergen are:  · Remembering when it started  · What you were doing at the time or just before that  · Any activities you were involved in  · Any new products or contacts  Below are some common causes. But remember that almost anything can cause a reaction. You may not even be aware that you came into contact with one of these things:  · Dust, mold, pollen  · Plants  (common ones are poison ivy and poison oak, but there are many others)   · Animals  · Foods such as shrimp, shellfish, peanuts, milk products, gluten, and eggs. Also food colorings, flavorings, and additives.  · Insect bites or stings such as bees, mosquitos, fleas, ticks  · Medicines such as penicillin, sulfa medicines, amoxicillin, aspirin, and ibuprofen. But any medicine can cause a reaction.  · Jewelry such as nickel or gold. This can be new, or something youve worn for a while, including zippers and buttons.  · Latex such as in gloves, clothes, toys, balloons, or some tapes. Some people allergic to latex may also have problems with foods like bananas, avocados, kiwi, papaya, or chestnuts.  · Lotions, perfumes, cosmetics, soaps, shampoos, skincare products, nail products  · Chemicals or dyes in clothing, linen, , hair dyes, soaps, iodine  Many viruses and common colds can cause a rash that is not an allergic reaction. Sometimes it is hard to tell the difference between allergies, sensitivity, or an intolerance to something. This is especially true with food. Many things can cause diarrhea, vomiting, stomach cramps, and skin irritation.  Home care    The goal of treatment is to help relieve the symptoms and get you feeling better. The rash will usually fade over several days. But it can sometimes last a couple of weeks. Over the next couple of days, there may be times when it is gets a little worse, and then better again. Here are some things to do:  · If you know what you are allergic to, stay away from it. Future reactions could be worse than this one.  · Avoid tight clothing and anything that heats up your skin (hot showers or baths, direct sunlight). Heat will make itching worse.  · An ice pack will relieve local areas of intense itching and redness. To make an ice pack, put ice cubes in a plastic bag that seals at the top. Wrap it in a thin, clean towel. Dont put the ice directly on the skin  because it can damage the skin.  · Oral diphenhydramine is an over-the-counter antihistamine sold at pharmacy and grocery stores. Unless a prescription antihistamine was given, diphenhydramine may be used to reduce itching if large areas of the skin are involved. It may make you sleepy. So be careful using it in the daytime or when going to school, working, or driving. Note: Dont use diphenhydramine if you have glaucoma or if you are a man with trouble urinating due to an enlarged prostate. There are other antihistamines that wont make you so sleepy. These are good choices for daytime use. Ask your pharmacist for suggestions.  · Dont use diphenhydramine cream on your skin. It can cause a further reaction in some people.  · To help prevent an infection, don't scratch the affected area. Scratching may worsen the reaction and damage your skin. It can also lead to an infection. Always check the affected for signs of an infection.  · Call your healthcare provider and ask what you can use to help decrease the itching.  · To decrease allergic reactions, try the following:    · Use heat-steam to clean your home  · Use high-efficiency particulate (HEPA) vacuums and filters  · Stay away from food and pet triggers  · Kill any cockroaches  · Clean your house often  Follow-up care  Follow up with your healthcare provider, or as advised. If you had a severe reaction today, or if you have had several mild to medium allergic reactions in the past, ask your provider about allergy testing. This can help you find out what you are allergic to. If your reaction included dizziness, fainting, or trouble breathing or swallowing, ask your provider about carrying auto-injectable epinephrine.  Call 911  Call 911 if any of these occur:  · Trouble breathing or swallowing, wheezing  · Cool, moist, pale skin  · Shortness of breath  · Hoarse voice or trouble speaking  · Confused   · Very drowsy or trouble awakening  · Fainting or loss of  consciousness  · Rapid heart rate  · Feeling of dizziness or weakness or a sudden drop in blood pressure  · Feeling of doom  · Feeling lightheaded  · Severe nausea or vomiting, or diarrhea  · Seizure  · Swelling in the face, eyelids, lips, mouth, throat or tongue  · Drooling  When to seek medical advice  Call your healthcare provider right away if any of these occur:  · Spreading areas of itching, redness or swelling  · Nausea or stomach cramps or abdominal pain  · Continuing or recurring symptoms  · Spreading areas of redness, swelling, or itching  · Signs of infection at the affected site:  ¨ Spreading redness  ¨ Increased pain or swelling  ¨ Fluid or colored drainage from the site  ¨ Fever of 100.4°F (38°C) or above lasting for 24 to 48 hours, or as directed by your provider  Date Last Reviewed: 3/1/2017  © 7270-1599 [employer]. [address] [place] [address]. All rights reserved. This information is not intended as a substitute for professional medical care. Always follow your healthcare professional's instructions.

## 2019-09-10 ENCOUNTER — TELEPHONE (OUTPATIENT)
Dept: URGENT CARE | Facility: CLINIC | Age: 69
End: 2019-09-10

## 2019-10-28 ENCOUNTER — OFFICE VISIT (OUTPATIENT)
Dept: INTERNAL MEDICINE | Facility: CLINIC | Age: 69
End: 2019-10-28
Attending: INTERNAL MEDICINE
Payer: MEDICARE

## 2019-10-28 ENCOUNTER — LAB VISIT (OUTPATIENT)
Dept: LAB | Facility: OTHER | Age: 69
End: 2019-10-28
Attending: INTERNAL MEDICINE
Payer: MEDICARE

## 2019-10-28 VITALS
HEIGHT: 71 IN | DIASTOLIC BLOOD PRESSURE: 74 MMHG | OXYGEN SATURATION: 99 % | BODY MASS INDEX: 27.44 KG/M2 | HEART RATE: 54 BPM | SYSTOLIC BLOOD PRESSURE: 130 MMHG | WEIGHT: 196 LBS

## 2019-10-28 DIAGNOSIS — E55.9 VITAMIN D DEFICIENCY: ICD-10-CM

## 2019-10-28 DIAGNOSIS — D50.8 OTHER IRON DEFICIENCY ANEMIA: ICD-10-CM

## 2019-10-28 DIAGNOSIS — Z23 FLU VACCINE NEED: Primary | ICD-10-CM

## 2019-10-28 DIAGNOSIS — D51.0 PERNICIOUS ANEMIA: ICD-10-CM

## 2019-10-28 DIAGNOSIS — L84 CORN OF TOE: ICD-10-CM

## 2019-10-28 DIAGNOSIS — E03.9 HYPOTHYROIDISM, UNSPECIFIED TYPE: ICD-10-CM

## 2019-10-28 DIAGNOSIS — E78.00 HIGH CHOLESTEROL: ICD-10-CM

## 2019-10-28 DIAGNOSIS — R79.89 OTHER SPECIFIED ABNORMAL FINDINGS OF BLOOD CHEMISTRY: ICD-10-CM

## 2019-10-28 DIAGNOSIS — E78.9 DISORDER OF LIPID METABOLISM: ICD-10-CM

## 2019-10-28 DIAGNOSIS — I10 ESSENTIAL HYPERTENSION: ICD-10-CM

## 2019-10-28 DIAGNOSIS — M20.40 HAMMER TOE, UNSPECIFIED LATERALITY: ICD-10-CM

## 2019-10-28 LAB
25(OH)D3+25(OH)D2 SERPL-MCNC: 31 NG/ML (ref 30–96)
ALBUMIN SERPL BCP-MCNC: 4.3 G/DL (ref 3.5–5.2)
ALP SERPL-CCNC: 69 U/L (ref 55–135)
ALT SERPL W/O P-5'-P-CCNC: 17 U/L (ref 10–44)
ANION GAP SERPL CALC-SCNC: 8 MMOL/L (ref 8–16)
AST SERPL-CCNC: 18 U/L (ref 10–40)
BASOPHILS # BLD AUTO: 0.07 K/UL (ref 0–0.2)
BASOPHILS NFR BLD: 1 % (ref 0–1.9)
BILIRUB SERPL-MCNC: 0.7 MG/DL (ref 0.1–1)
BUN SERPL-MCNC: 17 MG/DL (ref 8–23)
CALCIUM SERPL-MCNC: 9.4 MG/DL (ref 8.7–10.5)
CHLORIDE SERPL-SCNC: 105 MMOL/L (ref 95–110)
CHOLEST SERPL-MCNC: 198 MG/DL (ref 120–199)
CHOLEST/HDLC SERPL: 4.1 {RATIO} (ref 2–5)
CO2 SERPL-SCNC: 27 MMOL/L (ref 23–29)
CREAT SERPL-MCNC: 1 MG/DL (ref 0.5–1.4)
DIFFERENTIAL METHOD: ABNORMAL
EOSINOPHIL # BLD AUTO: 0.1 K/UL (ref 0–0.5)
EOSINOPHIL NFR BLD: 1.6 % (ref 0–8)
ERYTHROCYTE [DISTWIDTH] IN BLOOD BY AUTOMATED COUNT: 13.3 % (ref 11.5–14.5)
EST. GFR  (AFRICAN AMERICAN): >60 ML/MIN/1.73 M^2
EST. GFR  (NON AFRICAN AMERICAN): >60 ML/MIN/1.73 M^2
ESTIMATED AVG GLUCOSE: 105 MG/DL (ref 68–131)
GLUCOSE SERPL-MCNC: 91 MG/DL (ref 70–110)
HBA1C MFR BLD HPLC: 5.3 % (ref 4–5.6)
HCT VFR BLD AUTO: 43.6 % (ref 40–54)
HDLC SERPL-MCNC: 48 MG/DL (ref 40–75)
HDLC SERPL: 24.2 % (ref 20–50)
HGB BLD-MCNC: 13.9 G/DL (ref 14–18)
IMM GRANULOCYTES # BLD AUTO: 0.02 K/UL (ref 0–0.04)
IMM GRANULOCYTES NFR BLD AUTO: 0.3 % (ref 0–0.5)
LDLC SERPL CALC-MCNC: 134.8 MG/DL (ref 63–159)
LYMPHOCYTES # BLD AUTO: 1.3 K/UL (ref 1–4.8)
LYMPHOCYTES NFR BLD: 19.3 % (ref 18–48)
MCH RBC QN AUTO: 28.8 PG (ref 27–31)
MCHC RBC AUTO-ENTMCNC: 31.9 G/DL (ref 32–36)
MCV RBC AUTO: 90 FL (ref 82–98)
MONOCYTES # BLD AUTO: 0.6 K/UL (ref 0.3–1)
MONOCYTES NFR BLD: 8.3 % (ref 4–15)
NEUTROPHILS # BLD AUTO: 4.7 K/UL (ref 1.8–7.7)
NEUTROPHILS NFR BLD: 69.5 % (ref 38–73)
NONHDLC SERPL-MCNC: 150 MG/DL
NRBC BLD-RTO: 0 /100 WBC
PLATELET # BLD AUTO: 173 K/UL (ref 150–350)
PMV BLD AUTO: 12.7 FL (ref 9.2–12.9)
POTASSIUM SERPL-SCNC: 4.1 MMOL/L (ref 3.5–5.1)
PROT SERPL-MCNC: 7.3 G/DL (ref 6–8.4)
RBC # BLD AUTO: 4.83 M/UL (ref 4.6–6.2)
SODIUM SERPL-SCNC: 140 MMOL/L (ref 136–145)
TRIGL SERPL-MCNC: 76 MG/DL (ref 30–150)
TSH SERPL DL<=0.005 MIU/L-ACNC: 0.55 UIU/ML (ref 0.4–4)
VIT B12 SERPL-MCNC: 395 PG/ML (ref 210–950)
WBC # BLD AUTO: 6.75 K/UL (ref 3.9–12.7)

## 2019-10-28 PROCEDURE — 82306 VITAMIN D 25 HYDROXY: CPT

## 2019-10-28 PROCEDURE — 3078F DIAST BP <80 MM HG: CPT | Mod: CPTII,S$GLB,, | Performed by: INTERNAL MEDICINE

## 2019-10-28 PROCEDURE — 90662 FLU VACCINE - HIGH DOSE (65+) PRESERVATIVE FREE IM: ICD-10-PCS | Mod: S$GLB,,, | Performed by: INTERNAL MEDICINE

## 2019-10-28 PROCEDURE — 82607 VITAMIN B-12: CPT

## 2019-10-28 PROCEDURE — 83036 HEMOGLOBIN GLYCOSYLATED A1C: CPT

## 2019-10-28 PROCEDURE — 80053 COMPREHEN METABOLIC PANEL: CPT

## 2019-10-28 PROCEDURE — 3078F PR MOST RECENT DIASTOLIC BLOOD PRESSURE < 80 MM HG: ICD-10-PCS | Mod: CPTII,S$GLB,, | Performed by: INTERNAL MEDICINE

## 2019-10-28 PROCEDURE — G0008 FLU VACCINE - HIGH DOSE (65+) PRESERVATIVE FREE IM: ICD-10-PCS | Mod: S$GLB,,, | Performed by: INTERNAL MEDICINE

## 2019-10-28 PROCEDURE — 3075F PR MOST RECENT SYSTOLIC BLOOD PRESS GE 130-139MM HG: ICD-10-PCS | Mod: CPTII,S$GLB,, | Performed by: INTERNAL MEDICINE

## 2019-10-28 PROCEDURE — 99214 PR OFFICE/OUTPT VISIT, EST, LEVL IV, 30-39 MIN: ICD-10-PCS | Mod: 25,S$GLB,, | Performed by: INTERNAL MEDICINE

## 2019-10-28 PROCEDURE — 90662 IIV NO PRSV INCREASED AG IM: CPT | Mod: S$GLB,,, | Performed by: INTERNAL MEDICINE

## 2019-10-28 PROCEDURE — 84443 ASSAY THYROID STIM HORMONE: CPT

## 2019-10-28 PROCEDURE — 85025 COMPLETE CBC W/AUTO DIFF WBC: CPT

## 2019-10-28 PROCEDURE — 99214 OFFICE O/P EST MOD 30 MIN: CPT | Mod: 25,S$GLB,, | Performed by: INTERNAL MEDICINE

## 2019-10-28 PROCEDURE — 80061 LIPID PANEL: CPT

## 2019-10-28 PROCEDURE — 3075F SYST BP GE 130 - 139MM HG: CPT | Mod: CPTII,S$GLB,, | Performed by: INTERNAL MEDICINE

## 2019-10-28 PROCEDURE — 36415 COLL VENOUS BLD VENIPUNCTURE: CPT

## 2019-10-28 PROCEDURE — G0008 ADMIN INFLUENZA VIRUS VAC: HCPCS | Mod: S$GLB,,, | Performed by: INTERNAL MEDICINE

## 2019-10-28 NOTE — PROGRESS NOTES
Subjective:       Patient ID: Jerman Miguel is a 69 y.o. male.    Chief Complaint: Follow-up (6 month / discuss shingles vaccine)    He had a blister on the bottom of his left 3rd toe which popped a few weeks ago.    Follow-up   This is a chronic problem. The current episode started more than 1 year ago. The problem has been unchanged.   Hypertension   This is a chronic problem. The current episode started more than 1 year ago. The problem is controlled.     Review of Systems   Constitutional: Negative.    Respiratory: Negative.    Cardiovascular: Negative.        Objective:      Physical Exam   Constitutional: He appears well-developed and well-nourished.   HENT:   Head: Normocephalic and atraumatic.   Eyes: Pupils are equal, round, and reactive to light.   Cardiovascular: Normal rate, regular rhythm and normal heart sounds.   Pulmonary/Chest: Effort normal.   Musculoskeletal: He exhibits no edema.        Feet:    Hammer toes.  Corn right 3rd toe.   Neurological: He is alert.       Assessment:       1. Flu vaccine need    2. Essential hypertension    3. High cholesterol    4. Hammer toe, unspecified laterality    5. Corn of toe        Plan:       Per orders and D/C instructions.  Continue meds/diet for HTN, and high cholesterol, which are stable.     Podietry for hammer toes and corn.  Check labs.

## 2019-11-04 ENCOUNTER — OFFICE VISIT (OUTPATIENT)
Dept: PODIATRY | Facility: CLINIC | Age: 69
End: 2019-11-04
Payer: MEDICARE

## 2019-11-04 VITALS
SYSTOLIC BLOOD PRESSURE: 140 MMHG | DIASTOLIC BLOOD PRESSURE: 91 MMHG | HEART RATE: 61 BPM | WEIGHT: 196 LBS | BODY MASS INDEX: 27.44 KG/M2 | HEIGHT: 71 IN

## 2019-11-04 DIAGNOSIS — M21.41 PES PLANUS OF BOTH FEET: ICD-10-CM

## 2019-11-04 DIAGNOSIS — M19.072 PRIMARY OSTEOARTHRITIS OF BOTH ANKLES: Primary | ICD-10-CM

## 2019-11-04 DIAGNOSIS — M19.071 PRIMARY OSTEOARTHRITIS OF BOTH ANKLES: Primary | ICD-10-CM

## 2019-11-04 DIAGNOSIS — M21.42 PES PLANUS OF BOTH FEET: ICD-10-CM

## 2019-11-04 DIAGNOSIS — M25.572 SINUS TARSITIS OF LEFT FOOT: ICD-10-CM

## 2019-11-04 PROCEDURE — 99214 OFFICE O/P EST MOD 30 MIN: CPT | Mod: 25,S$GLB,, | Performed by: PODIATRIST

## 2019-11-04 PROCEDURE — 99999 PR PBB SHADOW E&M-EST. PATIENT-LVL III: ICD-10-PCS | Mod: PBBFAC,,, | Performed by: PODIATRIST

## 2019-11-04 PROCEDURE — 3077F PR MOST RECENT SYSTOLIC BLOOD PRESSURE >= 140 MM HG: ICD-10-PCS | Mod: CPTII,S$GLB,, | Performed by: PODIATRIST

## 2019-11-04 PROCEDURE — 3080F PR MOST RECENT DIASTOLIC BLOOD PRESSURE >= 90 MM HG: ICD-10-PCS | Mod: CPTII,S$GLB,, | Performed by: PODIATRIST

## 2019-11-04 PROCEDURE — 99999 PR PBB SHADOW E&M-EST. PATIENT-LVL III: CPT | Mod: PBBFAC,,, | Performed by: PODIATRIST

## 2019-11-04 PROCEDURE — 3077F SYST BP >= 140 MM HG: CPT | Mod: CPTII,S$GLB,, | Performed by: PODIATRIST

## 2019-11-04 PROCEDURE — 20605 PR DRAIN/INJECT INTERMEDIATE JOINT/BURSA: ICD-10-PCS | Mod: LT,S$GLB,, | Performed by: PODIATRIST

## 2019-11-04 PROCEDURE — 99214 PR OFFICE/OUTPT VISIT, EST, LEVL IV, 30-39 MIN: ICD-10-PCS | Mod: 25,S$GLB,, | Performed by: PODIATRIST

## 2019-11-04 PROCEDURE — 3080F DIAST BP >= 90 MM HG: CPT | Mod: CPTII,S$GLB,, | Performed by: PODIATRIST

## 2019-11-04 PROCEDURE — 20605 DRAIN/INJ JOINT/BURSA W/O US: CPT | Mod: LT,S$GLB,, | Performed by: PODIATRIST

## 2019-11-04 RX ORDER — TRIAMCINOLONE ACETONIDE 40 MG/ML
40 INJECTION, SUSPENSION INTRA-ARTICULAR; INTRAMUSCULAR
Status: COMPLETED | OUTPATIENT
Start: 2019-11-04 | End: 2019-11-04

## 2019-11-04 RX ADMIN — TRIAMCINOLONE ACETONIDE 40 MG: 40 INJECTION, SUSPENSION INTRA-ARTICULAR; INTRAMUSCULAR at 09:11

## 2019-11-06 NOTE — PROGRESS NOTES
Subjective:      Patient ID: Jerman Miguel is a 69 y.o. male.    Chief Complaint: Primary osteoarthritis of both ankles (both ankles)    Jerman is a 69 y.o. male who presents to the podiatry clinic  with complaint of  bilateral foot pain. Onset of the symptoms was several months ago. Precipitating event: none known. Current symptoms include: ability to bear weight, but with some pain. Aggravating factors: standing. Symptoms have gradually worsened. Patient has had no prior foot problems. Evaluation to date: none. Treatment to date: avoidance of offending activity. Patients rates pain 5/10 on pain scale.        Review of Systems   Constitution: Negative for chills and fever.   HENT: Negative for congestion and tinnitus.    Eyes: Negative for double vision and visual disturbance.   Cardiovascular: Negative for chest pain and claudication.   Respiratory: Negative for hemoptysis and shortness of breath.    Endocrine: Negative for cold intolerance and heat intolerance.   Hematologic/Lymphatic: Negative for adenopathy and bleeding problem.   Skin: Positive for dry skin. Negative for color change and nail changes.   Musculoskeletal: Positive for arthritis, joint pain and joint swelling. Negative for myalgias and stiffness.   Gastrointestinal: Negative for nausea and vomiting.   Genitourinary: Negative for dysuria and hematuria.   Neurological: Negative for numbness and paresthesias.   Psychiatric/Behavioral: Negative for altered mental status and suicidal ideas.   Allergic/Immunologic: Negative for environmental allergies and persistent infections.           Objective:      Physical Exam   Constitutional: He is oriented to person, place, and time. Vital signs are normal. He appears well-developed and well-nourished.   Cardiovascular:   Pulses:       Dorsalis pedis pulses are 2+ on the right side, and 2+ on the left side.        Posterior tibial pulses are 2+ on the right side, and 2+ on the left side.    Musculoskeletal:        Right foot: There is normal range of motion and no deformity.        Left foot: There is normal range of motion and no deformity.   Inspection and palpation of the muscles joints and bones of both lower extremities reveal that muscle strength for the anterior, lateral, and posterior muscle groups and intrinsic muscle groups of the foot are all 5 over 5 symmetrical.  Excessive pronation noted bilaterally with calcaneal valgus.  Tenderness to bilateral posterior tibial tendon insertion sites as well as tenderness to the subtalar joint/sinus tarsi left greater than right.   Feet:   Right Foot:   Skin Integrity: Negative for skin breakdown or erythema.   Left Foot:   Skin Integrity: Negative for skin breakdown or erythema.   Neurological: He is oriented to person, place, and time. He has normal strength. No sensory deficit.   Reflex Scores:       Patellar reflexes are 2+ on the right side and 2+ on the left side.       Achilles reflexes are 2+ on the right side and 2+ on the left side.  Sharp, dull, light touch, vibratory, and proprioceptive sensation are intact bilaterally. Deep tendon reflexes to patellar and Achilles tendon are symmetrical, 2/4 bilaterally. No ankle clonus or Babinski reflexes noted bilaterally. Coordination is normal to both feet and lower extremities.   Skin: Skin is warm, dry and intact. No cyanosis. Nails show no clubbing.   Skin turgor is normal bilaterally. Skin texture is well hydrated to both lower extremities. No lesions or rashes or wounds appreciated bilaterally. Nail plates 1 through 5 bilaterally are within normal limits for length and thickness. No nail clubbing or incurvation noted.             Assessment:       Encounter Diagnoses   Name Primary?    Primary osteoarthritis of both ankles Yes    Pes planus of both feet     Sinus tarsitis of left foot          Plan:       Jerman was seen today for primary osteoarthritis of both ankles.    Diagnoses and all  orders for this visit:    Primary osteoarthritis of both ankles  -     ORTHOTIC DEVICE (DME)    Pes planus of both feet    Sinus tarsitis of left foot    Other orders  -     triamcinolone acetonide injection 40 mg      I counseled the patient on his conditions, their implications and medical management.      A steroid injection was performed at left subtalar joint/sinus tarsi using 1% plain Lidocaine and 1 mL of dexamethasone. This was well tolerated.    I recommended patient be fitted for orthoses.  I explained that orthoses may improve function of the foot, reduce pain, decrease pronation, increase efficiency of muscle function of the foot and ankle and prevent surgery.  Alternative forms of biomechanical control of the foot and ankle were discussed with the patient.      Begin custom orthotics.  Recommend topical anti-inflammatory medication as well as icing and orthotic therapy with appropriate shoe gear which was all discussed in detail.  Follow-up in 6-8 weeks.    .

## 2019-11-15 DIAGNOSIS — R52 PAIN: ICD-10-CM

## 2019-11-16 RX ORDER — MELOXICAM 15 MG/1
TABLET ORAL
Qty: 90 TABLET | Refills: 0 | Status: SHIPPED | OUTPATIENT
Start: 2019-11-16 | End: 2020-03-11

## 2019-12-13 DIAGNOSIS — K04.7 TOOTH ABSCESS: Primary | ICD-10-CM

## 2019-12-13 RX ORDER — AMOXICILLIN 875 MG/1
875 TABLET, FILM COATED ORAL 2 TIMES DAILY
Qty: 14 TABLET | Refills: 0 | Status: SHIPPED | OUTPATIENT
Start: 2019-12-13 | End: 2021-03-09

## 2020-01-06 ENCOUNTER — PROCEDURE VISIT (OUTPATIENT)
Dept: PODIATRY | Facility: CLINIC | Age: 70
End: 2020-01-06
Payer: MEDICARE

## 2020-01-06 VITALS
HEIGHT: 71 IN | DIASTOLIC BLOOD PRESSURE: 91 MMHG | HEART RATE: 62 BPM | SYSTOLIC BLOOD PRESSURE: 135 MMHG | WEIGHT: 196 LBS | BODY MASS INDEX: 27.44 KG/M2

## 2020-01-06 DIAGNOSIS — M20.41 HAMMER TOE OF RIGHT FOOT: Primary | ICD-10-CM

## 2020-01-06 PROCEDURE — 28232 PR INCISION FLEX TOE TENDON: ICD-10-PCS | Mod: T7,S$GLB,, | Performed by: PODIATRIST

## 2020-01-06 PROCEDURE — 28232 INCISION OF TOE TENDON: CPT | Mod: T7,S$GLB,, | Performed by: PODIATRIST

## 2020-01-08 ENCOUNTER — TELEPHONE (OUTPATIENT)
Dept: PODIATRY | Facility: CLINIC | Age: 70
End: 2020-01-08

## 2020-01-08 RX ORDER — CEPHALEXIN 500 MG/1
500 CAPSULE ORAL EVERY 8 HOURS
Qty: 21 CAPSULE | Refills: 0 | Status: SHIPPED | OUTPATIENT
Start: 2020-01-08 | End: 2021-03-09

## 2020-01-08 NOTE — TELEPHONE ENCOUNTER
----- Message from Bunny Gardner sent at 1/8/2020  9:32 AM CST -----  Contact: self  Pt states he has yet to receive his new medication  has prescribed to him. Pt was unable to give me the name of the medication he just knows it was an antibiotic.       Contact Info

## 2020-01-08 NOTE — TELEPHONE ENCOUNTER
Pt states he has a procedure done on 1/6 with Dr. Carty and he would like abx called in. I informed him I'd relay the message to Dr. Carty. Pt voiced understanding and call ended.

## 2020-01-08 NOTE — PROCEDURES
Attention was directed to the right 3rd digit digit where 4cc of 1% lidocaine plain was injected in a digital block fashion, the foot was then prepped and draped using asceptic technique, anesthesia was confirmed, a #15 blade was inserted into the central/plantar aspect of the joint contracture and the flexor tendon was released, the digit was straightened manually and reduction was deemed adequate, tincture of benzoin and steri-strips were applied to splint the digit in a corrected position and closed with 4-0 nylon suture and dry sterile dressing applied to the foot. Capillary fill time was less than 3 seconds.  This was tolerated well with no complications.  Follow-up in 1 week for re-evaluation.  Postoperative instructions were discussed in detail.

## 2020-01-16 ENCOUNTER — OFFICE VISIT (OUTPATIENT)
Dept: PODIATRY | Facility: CLINIC | Age: 70
End: 2020-01-16
Payer: MEDICARE

## 2020-01-16 VITALS
HEIGHT: 71 IN | WEIGHT: 196 LBS | DIASTOLIC BLOOD PRESSURE: 90 MMHG | HEART RATE: 73 BPM | SYSTOLIC BLOOD PRESSURE: 146 MMHG | BODY MASS INDEX: 27.44 KG/M2

## 2020-01-16 DIAGNOSIS — M20.41 HAMMER TOE OF RIGHT FOOT: Primary | ICD-10-CM

## 2020-01-16 PROCEDURE — 99999 PR PBB SHADOW E&M-EST. PATIENT-LVL III: CPT | Mod: PBBFAC,,, | Performed by: PODIATRIST

## 2020-01-16 PROCEDURE — 99999 PR PBB SHADOW E&M-EST. PATIENT-LVL III: ICD-10-PCS | Mod: PBBFAC,,, | Performed by: PODIATRIST

## 2020-01-16 PROCEDURE — 99024 PR POST-OP FOLLOW-UP VISIT: ICD-10-PCS | Mod: S$GLB,,, | Performed by: PODIATRIST

## 2020-01-16 PROCEDURE — 99024 POSTOP FOLLOW-UP VISIT: CPT | Mod: S$GLB,,, | Performed by: PODIATRIST

## 2020-01-16 NOTE — PROGRESS NOTES
One-week status post flexor tenotomy.  He is doing very well.  Toes in good position with the incision showing no signs of infection or dehiscence.  Sutures removed, follow up in 3 months may resume normal activity.

## 2020-03-11 DIAGNOSIS — R52 PAIN: ICD-10-CM

## 2020-03-11 RX ORDER — MELOXICAM 15 MG/1
TABLET ORAL
Qty: 90 TABLET | Refills: 0 | Status: SHIPPED | OUTPATIENT
Start: 2020-03-11 | End: 2020-05-25

## 2020-04-21 RX ORDER — DICLOFENAC SODIUM 10 MG/G
GEL TOPICAL
Qty: 100 G | OUTPATIENT
Start: 2020-04-21

## 2020-05-04 RX ORDER — LISINOPRIL 10 MG/1
TABLET ORAL
Qty: 90 TABLET | Refills: 3 | Status: SHIPPED | OUTPATIENT
Start: 2020-05-04 | End: 2021-04-16

## 2020-05-24 DIAGNOSIS — R52 PAIN: ICD-10-CM

## 2020-05-25 RX ORDER — MELOXICAM 15 MG/1
TABLET ORAL
Qty: 90 TABLET | Refills: 0 | Status: SHIPPED | OUTPATIENT
Start: 2020-05-25 | End: 2020-07-31

## 2021-01-09 ENCOUNTER — IMMUNIZATION (OUTPATIENT)
Dept: PRIMARY CARE CLINIC | Facility: CLINIC | Age: 71
End: 2021-01-09
Payer: MEDICARE

## 2021-01-09 DIAGNOSIS — Z23 NEED FOR VACCINATION: ICD-10-CM

## 2021-01-09 PROCEDURE — 91300 COVID-19, MRNA, LNP-S, PF, 30 MCG/0.3 ML DOSE VACCINE: CPT | Mod: PBBFAC | Performed by: FAMILY MEDICINE

## 2021-01-30 ENCOUNTER — IMMUNIZATION (OUTPATIENT)
Dept: PRIMARY CARE CLINIC | Facility: CLINIC | Age: 71
End: 2021-01-30
Payer: MEDICARE

## 2021-01-30 DIAGNOSIS — Z23 NEED FOR VACCINATION: Primary | ICD-10-CM

## 2021-01-30 PROCEDURE — 91300 COVID-19, MRNA, LNP-S, PF, 30 MCG/0.3 ML DOSE VACCINE: CPT | Mod: PBBFAC | Performed by: EMERGENCY MEDICINE

## 2021-01-30 PROCEDURE — 0002A COVID-19, MRNA, LNP-S, PF, 30 MCG/0.3 ML DOSE VACCINE: CPT | Mod: PBBFAC | Performed by: EMERGENCY MEDICINE

## 2021-03-09 ENCOUNTER — LAB VISIT (OUTPATIENT)
Dept: LAB | Facility: OTHER | Age: 71
End: 2021-03-09
Attending: INTERNAL MEDICINE
Payer: MEDICARE

## 2021-03-09 ENCOUNTER — OFFICE VISIT (OUTPATIENT)
Dept: INTERNAL MEDICINE | Facility: CLINIC | Age: 71
End: 2021-03-09
Attending: INTERNAL MEDICINE
Payer: MEDICARE

## 2021-03-09 VITALS
DIASTOLIC BLOOD PRESSURE: 70 MMHG | OXYGEN SATURATION: 99 % | HEIGHT: 71 IN | HEART RATE: 89 BPM | BODY MASS INDEX: 27.86 KG/M2 | SYSTOLIC BLOOD PRESSURE: 110 MMHG | WEIGHT: 199 LBS

## 2021-03-09 DIAGNOSIS — Z00.00 ROUTINE ADULT HEALTH MAINTENANCE: ICD-10-CM

## 2021-03-09 DIAGNOSIS — G89.29 CHRONIC PAIN OF RIGHT ANKLE: ICD-10-CM

## 2021-03-09 DIAGNOSIS — D50.8 OTHER IRON DEFICIENCY ANEMIA: ICD-10-CM

## 2021-03-09 DIAGNOSIS — Z13.31 SCREENING FOR DEPRESSION: ICD-10-CM

## 2021-03-09 DIAGNOSIS — E55.9 VITAMIN D DEFICIENCY: ICD-10-CM

## 2021-03-09 DIAGNOSIS — E78.00 HIGH CHOLESTEROL: ICD-10-CM

## 2021-03-09 DIAGNOSIS — H53.8 BLURRED VISION, BILATERAL: ICD-10-CM

## 2021-03-09 DIAGNOSIS — M25.673 DECREASED RANGE OF MOTION OF ANKLE, UNSPECIFIED LATERALITY: ICD-10-CM

## 2021-03-09 DIAGNOSIS — D51.0 PERNICIOUS ANEMIA: ICD-10-CM

## 2021-03-09 DIAGNOSIS — Z12.11 COLON CANCER SCREENING: ICD-10-CM

## 2021-03-09 DIAGNOSIS — E03.9 HYPOTHYROIDISM, UNSPECIFIED TYPE: ICD-10-CM

## 2021-03-09 DIAGNOSIS — E78.9 DISORDER OF LIPID METABOLISM: ICD-10-CM

## 2021-03-09 DIAGNOSIS — Z12.5 SCREENING FOR PROSTATE CANCER: ICD-10-CM

## 2021-03-09 DIAGNOSIS — R79.89 OTHER SPECIFIED ABNORMAL FINDINGS OF BLOOD CHEMISTRY: ICD-10-CM

## 2021-03-09 DIAGNOSIS — M25.571 CHRONIC PAIN OF RIGHT ANKLE: ICD-10-CM

## 2021-03-09 DIAGNOSIS — I10 ESSENTIAL HYPERTENSION: Primary | ICD-10-CM

## 2021-03-09 DIAGNOSIS — Z13.39 SCREENING FOR ALCOHOLISM: ICD-10-CM

## 2021-03-09 DIAGNOSIS — M79.671 RIGHT FOOT PAIN: ICD-10-CM

## 2021-03-09 LAB
25(OH)D3+25(OH)D2 SERPL-MCNC: 18 NG/ML (ref 30–96)
ALBUMIN SERPL BCP-MCNC: 4.4 G/DL (ref 3.5–5.2)
ALP SERPL-CCNC: 80 U/L (ref 55–135)
ALT SERPL W/O P-5'-P-CCNC: 15 U/L (ref 10–44)
ANION GAP SERPL CALC-SCNC: 10 MMOL/L (ref 8–16)
AST SERPL-CCNC: 17 U/L (ref 10–40)
BASOPHILS # BLD AUTO: 0.06 K/UL (ref 0–0.2)
BASOPHILS NFR BLD: 0.8 % (ref 0–1.9)
BILIRUB SERPL-MCNC: 0.5 MG/DL (ref 0.1–1)
BUN SERPL-MCNC: 16 MG/DL (ref 8–23)
CALCIUM SERPL-MCNC: 9.6 MG/DL (ref 8.7–10.5)
CHLORIDE SERPL-SCNC: 103 MMOL/L (ref 95–110)
CO2 SERPL-SCNC: 25 MMOL/L (ref 23–29)
CREAT SERPL-MCNC: 1 MG/DL (ref 0.5–1.4)
DIFFERENTIAL METHOD: ABNORMAL
EOSINOPHIL # BLD AUTO: 0 K/UL (ref 0–0.5)
EOSINOPHIL NFR BLD: 0.4 % (ref 0–8)
ERYTHROCYTE [DISTWIDTH] IN BLOOD BY AUTOMATED COUNT: 14 % (ref 11.5–14.5)
EST. GFR  (AFRICAN AMERICAN): >60 ML/MIN/1.73 M^2
EST. GFR  (NON AFRICAN AMERICAN): >60 ML/MIN/1.73 M^2
ESTIMATED AVG GLUCOSE: 103 MG/DL (ref 68–131)
GLUCOSE SERPL-MCNC: 95 MG/DL (ref 70–110)
HBA1C MFR BLD: 5.2 % (ref 4–5.6)
HCT VFR BLD AUTO: 39.6 % (ref 40–54)
HGB BLD-MCNC: 12.4 G/DL (ref 14–18)
IMM GRANULOCYTES # BLD AUTO: 0.03 K/UL (ref 0–0.04)
IMM GRANULOCYTES NFR BLD AUTO: 0.4 % (ref 0–0.5)
LYMPHOCYTES # BLD AUTO: 1.2 K/UL (ref 1–4.8)
LYMPHOCYTES NFR BLD: 15.9 % (ref 18–48)
MCH RBC QN AUTO: 26.4 PG (ref 27–31)
MCHC RBC AUTO-ENTMCNC: 31.3 G/DL (ref 32–36)
MCV RBC AUTO: 84 FL (ref 82–98)
MONOCYTES # BLD AUTO: 0.7 K/UL (ref 0.3–1)
MONOCYTES NFR BLD: 8.7 % (ref 4–15)
NEUTROPHILS # BLD AUTO: 5.8 K/UL (ref 1.8–7.7)
NEUTROPHILS NFR BLD: 73.8 % (ref 38–73)
NRBC BLD-RTO: 0 /100 WBC
PLATELET # BLD AUTO: 156 K/UL (ref 150–350)
PMV BLD AUTO: 13 FL (ref 9.2–12.9)
POTASSIUM SERPL-SCNC: 4.4 MMOL/L (ref 3.5–5.1)
PROT SERPL-MCNC: 7.8 G/DL (ref 6–8.4)
RBC # BLD AUTO: 4.7 M/UL (ref 4.6–6.2)
SODIUM SERPL-SCNC: 138 MMOL/L (ref 136–145)
TSH SERPL DL<=0.005 MIU/L-ACNC: 1.09 UIU/ML (ref 0.4–4)
WBC # BLD AUTO: 7.81 K/UL (ref 3.9–12.7)

## 2021-03-09 PROCEDURE — 82306 VITAMIN D 25 HYDROXY: CPT | Performed by: INTERNAL MEDICINE

## 2021-03-09 PROCEDURE — 99214 OFFICE O/P EST MOD 30 MIN: CPT | Mod: 25,S$GLB,, | Performed by: INTERNAL MEDICINE

## 2021-03-09 PROCEDURE — 90694 FLU VACCINE - QUADRIVALENT - ADJUVANTED: ICD-10-PCS | Mod: S$GLB,,, | Performed by: INTERNAL MEDICINE

## 2021-03-09 PROCEDURE — 1159F MED LIST DOCD IN RCRD: CPT | Mod: S$GLB,,, | Performed by: INTERNAL MEDICINE

## 2021-03-09 PROCEDURE — G0009 FLU VACCINE - QUADRIVALENT - ADJUVANTED: ICD-10-PCS | Mod: S$GLB,,, | Performed by: INTERNAL MEDICINE

## 2021-03-09 PROCEDURE — 1160F RVW MEDS BY RX/DR IN RCRD: CPT | Mod: S$GLB,,, | Performed by: INTERNAL MEDICINE

## 2021-03-09 PROCEDURE — 3078F PR MOST RECENT DIASTOLIC BLOOD PRESSURE < 80 MM HG: ICD-10-PCS | Mod: CPTII,S$GLB,, | Performed by: INTERNAL MEDICINE

## 2021-03-09 PROCEDURE — 90732 PPSV23 VACC 2 YRS+ SUBQ/IM: CPT | Mod: S$GLB,,, | Performed by: INTERNAL MEDICINE

## 2021-03-09 PROCEDURE — G0008 PNEUMOCOCCAL POLYSACCHARIDE VACCINE 23-VALENT =>2YO SQ IM: ICD-10-PCS | Mod: S$GLB,,, | Performed by: INTERNAL MEDICINE

## 2021-03-09 PROCEDURE — 84153 ASSAY OF PSA TOTAL: CPT | Performed by: INTERNAL MEDICINE

## 2021-03-09 PROCEDURE — 3074F SYST BP LT 130 MM HG: CPT | Mod: CPTII,S$GLB,, | Performed by: INTERNAL MEDICINE

## 2021-03-09 PROCEDURE — 1159F PR MEDICATION LIST DOCUMENTED IN MEDICAL RECORD: ICD-10-PCS | Mod: S$GLB,,, | Performed by: INTERNAL MEDICINE

## 2021-03-09 PROCEDURE — 85025 COMPLETE CBC W/AUTO DIFF WBC: CPT | Performed by: INTERNAL MEDICINE

## 2021-03-09 PROCEDURE — 3066F NEPHROPATHY DOC TX: CPT | Mod: S$GLB,,, | Performed by: INTERNAL MEDICINE

## 2021-03-09 PROCEDURE — 99214 PR OFFICE/OUTPT VISIT, EST, LEVL IV, 30-39 MIN: ICD-10-PCS | Mod: 25,S$GLB,, | Performed by: INTERNAL MEDICINE

## 2021-03-09 PROCEDURE — G0442 ANNUAL ALCOHOL SCREEN 15 MIN: HCPCS | Mod: 59,S$GLB,, | Performed by: INTERNAL MEDICINE

## 2021-03-09 PROCEDURE — 82607 VITAMIN B-12: CPT | Performed by: INTERNAL MEDICINE

## 2021-03-09 PROCEDURE — 3078F DIAST BP <80 MM HG: CPT | Mod: CPTII,S$GLB,, | Performed by: INTERNAL MEDICINE

## 2021-03-09 PROCEDURE — 36415 COLL VENOUS BLD VENIPUNCTURE: CPT | Performed by: INTERNAL MEDICINE

## 2021-03-09 PROCEDURE — 80053 COMPREHEN METABOLIC PANEL: CPT | Performed by: INTERNAL MEDICINE

## 2021-03-09 PROCEDURE — 3074F PR MOST RECENT SYSTOLIC BLOOD PRESSURE < 130 MM HG: ICD-10-PCS | Mod: CPTII,S$GLB,, | Performed by: INTERNAL MEDICINE

## 2021-03-09 PROCEDURE — G0008 ADMIN INFLUENZA VIRUS VAC: HCPCS | Mod: S$GLB,,, | Performed by: INTERNAL MEDICINE

## 2021-03-09 PROCEDURE — G0442 PR  ALCOHOL SCREENING: ICD-10-PCS | Mod: 59,S$GLB,, | Performed by: INTERNAL MEDICINE

## 2021-03-09 PROCEDURE — 1160F PR REVIEW ALL MEDS BY PRESCRIBER/CLIN PHARMACIST DOCUMENTED: ICD-10-PCS | Mod: S$GLB,,, | Performed by: INTERNAL MEDICINE

## 2021-03-09 PROCEDURE — G0009 ADMIN PNEUMOCOCCAL VACCINE: HCPCS | Mod: S$GLB,,, | Performed by: INTERNAL MEDICINE

## 2021-03-09 PROCEDURE — 3066F PR DOCUMENTATION OF TREATMENT FOR NEPHROPATHY: ICD-10-PCS | Mod: S$GLB,,, | Performed by: INTERNAL MEDICINE

## 2021-03-09 PROCEDURE — 83036 HEMOGLOBIN GLYCOSYLATED A1C: CPT | Performed by: INTERNAL MEDICINE

## 2021-03-09 PROCEDURE — 3008F BODY MASS INDEX DOCD: CPT | Mod: CPTII,S$GLB,, | Performed by: INTERNAL MEDICINE

## 2021-03-09 PROCEDURE — 3008F PR BODY MASS INDEX (BMI) DOCUMENTED: ICD-10-PCS | Mod: CPTII,S$GLB,, | Performed by: INTERNAL MEDICINE

## 2021-03-09 PROCEDURE — 84443 ASSAY THYROID STIM HORMONE: CPT | Performed by: INTERNAL MEDICINE

## 2021-03-09 PROCEDURE — 90694 VACC AIIV4 NO PRSRV 0.5ML IM: CPT | Mod: S$GLB,,, | Performed by: INTERNAL MEDICINE

## 2021-03-09 PROCEDURE — 90732 PNEUMOCOCCAL POLYSACCHARIDE VACCINE 23-VALENT =>2YO SQ IM: ICD-10-PCS | Mod: S$GLB,,, | Performed by: INTERNAL MEDICINE

## 2021-03-09 PROCEDURE — 80061 LIPID PANEL: CPT | Performed by: INTERNAL MEDICINE

## 2021-03-10 LAB
CHOLEST SERPL-MCNC: 204 MG/DL (ref 120–199)
CHOLEST/HDLC SERPL: 3.8 {RATIO} (ref 2–5)
COMPLEXED PSA SERPL-MCNC: 1.7 NG/ML (ref 0–4)
HDLC SERPL-MCNC: 54 MG/DL (ref 40–75)
HDLC SERPL: 26.5 % (ref 20–50)
LDLC SERPL CALC-MCNC: 134.2 MG/DL (ref 63–159)
NONHDLC SERPL-MCNC: 150 MG/DL
TRIGL SERPL-MCNC: 79 MG/DL (ref 30–150)
VIT B12 SERPL-MCNC: 348 PG/ML (ref 210–950)

## 2021-04-05 ENCOUNTER — TELEPHONE (OUTPATIENT)
Dept: PODIATRY | Facility: CLINIC | Age: 71
End: 2021-04-05

## 2021-04-06 ENCOUNTER — OFFICE VISIT (OUTPATIENT)
Dept: PODIATRY | Facility: CLINIC | Age: 71
End: 2021-04-06
Payer: MEDICARE

## 2021-04-06 VITALS
SYSTOLIC BLOOD PRESSURE: 138 MMHG | BODY MASS INDEX: 27.77 KG/M2 | DIASTOLIC BLOOD PRESSURE: 90 MMHG | WEIGHT: 199.06 LBS | HEART RATE: 71 BPM

## 2021-04-06 DIAGNOSIS — M19.072 PRIMARY OSTEOARTHRITIS OF BOTH ANKLES: ICD-10-CM

## 2021-04-06 DIAGNOSIS — M25.562 LEFT ANTERIOR KNEE PAIN: Primary | ICD-10-CM

## 2021-04-06 DIAGNOSIS — R60.0 BILATERAL LEG EDEMA: ICD-10-CM

## 2021-04-06 DIAGNOSIS — M19.071 PRIMARY OSTEOARTHRITIS OF BOTH ANKLES: ICD-10-CM

## 2021-04-06 PROCEDURE — 1159F MED LIST DOCD IN RCRD: CPT | Mod: S$GLB,,, | Performed by: PODIATRIST

## 2021-04-06 PROCEDURE — 3008F BODY MASS INDEX DOCD: CPT | Mod: CPTII,S$GLB,, | Performed by: PODIATRIST

## 2021-04-06 PROCEDURE — 1125F AMNT PAIN NOTED PAIN PRSNT: CPT | Mod: S$GLB,,, | Performed by: PODIATRIST

## 2021-04-06 PROCEDURE — 99214 OFFICE O/P EST MOD 30 MIN: CPT | Mod: 25,S$GLB,, | Performed by: PODIATRIST

## 2021-04-06 PROCEDURE — 3008F PR BODY MASS INDEX (BMI) DOCUMENTED: ICD-10-PCS | Mod: CPTII,S$GLB,, | Performed by: PODIATRIST

## 2021-04-06 PROCEDURE — 99999 PR PBB SHADOW E&M-EST. PATIENT-LVL III: CPT | Mod: PBBFAC,,, | Performed by: PODIATRIST

## 2021-04-06 PROCEDURE — 99999 PR PBB SHADOW E&M-EST. PATIENT-LVL III: ICD-10-PCS | Mod: PBBFAC,,, | Performed by: PODIATRIST

## 2021-04-06 PROCEDURE — 1125F PR PAIN SEVERITY QUANTIFIED, PAIN PRESENT: ICD-10-PCS | Mod: S$GLB,,, | Performed by: PODIATRIST

## 2021-04-06 PROCEDURE — 20605 PR DRAIN/INJECT INTERMEDIATE JOINT/BURSA: ICD-10-PCS | Mod: LT,S$GLB,, | Performed by: PODIATRIST

## 2021-04-06 PROCEDURE — 99214 PR OFFICE/OUTPT VISIT, EST, LEVL IV, 30-39 MIN: ICD-10-PCS | Mod: 25,S$GLB,, | Performed by: PODIATRIST

## 2021-04-06 PROCEDURE — 1159F PR MEDICATION LIST DOCUMENTED IN MEDICAL RECORD: ICD-10-PCS | Mod: S$GLB,,, | Performed by: PODIATRIST

## 2021-04-06 PROCEDURE — 20605 DRAIN/INJ JOINT/BURSA W/O US: CPT | Mod: LT,S$GLB,, | Performed by: PODIATRIST

## 2021-04-06 RX ORDER — DICLOFENAC SODIUM 10 MG/G
2 GEL TOPICAL 4 TIMES DAILY
Qty: 1 TUBE | Refills: 2 | Status: SHIPPED | OUTPATIENT
Start: 2021-04-06 | End: 2022-05-04 | Stop reason: SDUPTHER

## 2021-04-06 RX ORDER — TRIAMCINOLONE ACETONIDE 40 MG/ML
40 INJECTION, SUSPENSION INTRA-ARTICULAR; INTRAMUSCULAR
Status: COMPLETED | OUTPATIENT
Start: 2021-04-06 | End: 2021-04-06

## 2021-04-06 RX ADMIN — TRIAMCINOLONE ACETONIDE 40 MG: 40 INJECTION, SUSPENSION INTRA-ARTICULAR; INTRAMUSCULAR at 12:04

## 2021-04-13 ENCOUNTER — TELEPHONE (OUTPATIENT)
Dept: PODIATRY | Facility: CLINIC | Age: 71
End: 2021-04-13

## 2021-04-14 ENCOUNTER — OFFICE VISIT (OUTPATIENT)
Dept: PODIATRY | Facility: CLINIC | Age: 71
End: 2021-04-14
Payer: MEDICARE

## 2021-04-14 ENCOUNTER — TELEPHONE (OUTPATIENT)
Dept: PODIATRY | Facility: CLINIC | Age: 71
End: 2021-04-14

## 2021-04-14 ENCOUNTER — HOSPITAL ENCOUNTER (OUTPATIENT)
Dept: RADIOLOGY | Facility: HOSPITAL | Age: 71
Discharge: HOME | End: 2021-04-14
Attending: PODIATRIST
Payer: MEDICARE

## 2021-04-14 VITALS
HEIGHT: 71 IN | SYSTOLIC BLOOD PRESSURE: 141 MMHG | BODY MASS INDEX: 27.77 KG/M2 | HEART RATE: 62 BPM | DIASTOLIC BLOOD PRESSURE: 90 MMHG

## 2021-04-14 DIAGNOSIS — R60.0 EDEMA OF RIGHT FOOT: ICD-10-CM

## 2021-04-14 DIAGNOSIS — M79.671 RIGHT FOOT PAIN: ICD-10-CM

## 2021-04-14 DIAGNOSIS — M79.671 RIGHT FOOT PAIN: Primary | ICD-10-CM

## 2021-04-14 PROCEDURE — 1125F PR PAIN SEVERITY QUANTIFIED, PAIN PRESENT: ICD-10-PCS | Mod: S$GLB,,, | Performed by: PODIATRIST

## 2021-04-14 PROCEDURE — 1125F AMNT PAIN NOTED PAIN PRSNT: CPT | Mod: S$GLB,,, | Performed by: PODIATRIST

## 2021-04-14 PROCEDURE — 73630 X-RAY EXAM OF FOOT: CPT | Mod: TC,PN,RT

## 2021-04-14 PROCEDURE — 99999 PR PBB SHADOW E&M-EST. PATIENT-LVL III: ICD-10-PCS | Mod: PBBFAC,,, | Performed by: PODIATRIST

## 2021-04-14 PROCEDURE — 1159F PR MEDICATION LIST DOCUMENTED IN MEDICAL RECORD: ICD-10-PCS | Mod: S$GLB,,, | Performed by: PODIATRIST

## 2021-04-14 PROCEDURE — 3008F PR BODY MASS INDEX (BMI) DOCUMENTED: ICD-10-PCS | Mod: CPTII,S$GLB,, | Performed by: PODIATRIST

## 2021-04-14 PROCEDURE — 73630 XR FOOT COMPLETE 3 VIEW RIGHT: ICD-10-PCS | Mod: 26,RT,, | Performed by: RADIOLOGY

## 2021-04-14 PROCEDURE — 73630 X-RAY EXAM OF FOOT: CPT | Mod: 26,RT,, | Performed by: RADIOLOGY

## 2021-04-14 PROCEDURE — 99214 PR OFFICE/OUTPT VISIT, EST, LEVL IV, 30-39 MIN: ICD-10-PCS | Mod: S$GLB,,, | Performed by: PODIATRIST

## 2021-04-14 PROCEDURE — 3008F BODY MASS INDEX DOCD: CPT | Mod: CPTII,S$GLB,, | Performed by: PODIATRIST

## 2021-04-14 PROCEDURE — 1159F MED LIST DOCD IN RCRD: CPT | Mod: S$GLB,,, | Performed by: PODIATRIST

## 2021-04-14 PROCEDURE — 99999 PR PBB SHADOW E&M-EST. PATIENT-LVL III: CPT | Mod: PBBFAC,,, | Performed by: PODIATRIST

## 2021-04-14 PROCEDURE — 99214 OFFICE O/P EST MOD 30 MIN: CPT | Mod: S$GLB,,, | Performed by: PODIATRIST

## 2021-04-14 RX ORDER — AMOXICILLIN AND CLAVULANATE POTASSIUM 875; 125 MG/1; MG/1
1 TABLET, FILM COATED ORAL EVERY 12 HOURS
Qty: 20 TABLET | Refills: 0 | Status: SHIPPED | OUTPATIENT
Start: 2021-04-14 | End: 2021-04-24

## 2021-04-15 ENCOUNTER — TELEPHONE (OUTPATIENT)
Dept: PODIATRY | Facility: CLINIC | Age: 71
End: 2021-04-15

## 2021-04-21 ENCOUNTER — OFFICE VISIT (OUTPATIENT)
Dept: PODIATRY | Facility: CLINIC | Age: 71
End: 2021-04-21
Payer: MEDICARE

## 2021-04-21 VITALS — SYSTOLIC BLOOD PRESSURE: 134 MMHG | DIASTOLIC BLOOD PRESSURE: 84 MMHG | HEART RATE: 79 BPM

## 2021-04-21 DIAGNOSIS — M79.5 FOREIGN BODY (FB) IN SOFT TISSUE: ICD-10-CM

## 2021-04-21 DIAGNOSIS — M19.071 PRIMARY OSTEOARTHRITIS OF BOTH ANKLES: Primary | ICD-10-CM

## 2021-04-21 DIAGNOSIS — M19.072 PRIMARY OSTEOARTHRITIS OF BOTH ANKLES: Primary | ICD-10-CM

## 2021-04-21 PROCEDURE — 1159F MED LIST DOCD IN RCRD: CPT | Mod: S$GLB,,, | Performed by: PODIATRIST

## 2021-04-21 PROCEDURE — 99214 PR OFFICE/OUTPT VISIT, EST, LEVL IV, 30-39 MIN: ICD-10-PCS | Mod: S$GLB,,, | Performed by: PODIATRIST

## 2021-04-21 PROCEDURE — 99999 PR PBB SHADOW E&M-EST. PATIENT-LVL III: CPT | Mod: PBBFAC,,, | Performed by: PODIATRIST

## 2021-04-21 PROCEDURE — 1159F PR MEDICATION LIST DOCUMENTED IN MEDICAL RECORD: ICD-10-PCS | Mod: S$GLB,,, | Performed by: PODIATRIST

## 2021-04-21 PROCEDURE — 99999 PR PBB SHADOW E&M-EST. PATIENT-LVL III: ICD-10-PCS | Mod: PBBFAC,,, | Performed by: PODIATRIST

## 2021-04-21 PROCEDURE — 99214 OFFICE O/P EST MOD 30 MIN: CPT | Mod: S$GLB,,, | Performed by: PODIATRIST

## 2021-04-21 PROCEDURE — 1126F PR PAIN SEVERITY QUANTIFIED, NO PAIN PRESENT: ICD-10-PCS | Mod: S$GLB,,, | Performed by: PODIATRIST

## 2021-04-21 PROCEDURE — 1126F AMNT PAIN NOTED NONE PRSNT: CPT | Mod: S$GLB,,, | Performed by: PODIATRIST

## 2021-07-20 ENCOUNTER — PATIENT OUTREACH (OUTPATIENT)
Dept: ADMINISTRATIVE | Facility: HOSPITAL | Age: 71
End: 2021-07-20

## 2021-09-15 ENCOUNTER — HOSPITAL ENCOUNTER (INPATIENT)
Facility: HOSPITAL | Age: 71
LOS: 1 days | Discharge: HOME | DRG: 300 | End: 2021-09-16
Attending: EMERGENCY MEDICINE | Admitting: EMERGENCY MEDICINE
Payer: MEDICARE

## 2021-09-15 DIAGNOSIS — I77.72 DISSECTION OF RIGHT ILIAC ARTERY: ICD-10-CM

## 2021-09-15 DIAGNOSIS — R10.9 ABDOMINAL PAIN: ICD-10-CM

## 2021-09-15 DIAGNOSIS — I77.72 ILIAC ARTERY DISSECTION: ICD-10-CM

## 2021-09-15 DIAGNOSIS — N20.0 NEPHROLITHIASIS: Primary | ICD-10-CM

## 2021-09-15 DIAGNOSIS — I77.73: ICD-10-CM

## 2021-09-15 DIAGNOSIS — I77.72 ILIAC DISSECTION: ICD-10-CM

## 2021-09-15 PROBLEM — K57.90 DIVERTICULOSIS: Status: ACTIVE | Noted: 2021-09-15

## 2021-09-15 PROBLEM — E78.00 HIGH CHOLESTEROL: Status: RESOLVED | Noted: 2017-11-02 | Resolved: 2021-09-15

## 2021-09-15 PROBLEM — N20.1 URETERAL STONE: Status: ACTIVE | Noted: 2021-09-15

## 2021-09-15 LAB
ABO + RH BLD: NORMAL
ALBUMIN SERPL BCP-MCNC: 3.8 G/DL (ref 3.5–5.2)
ALP SERPL-CCNC: 68 U/L (ref 55–135)
ALT SERPL W/O P-5'-P-CCNC: 13 U/L (ref 10–44)
ANION GAP SERPL CALC-SCNC: 10 MMOL/L (ref 8–16)
APTT BLDCRRT: 24.8 SEC (ref 21–32)
AST SERPL-CCNC: 15 U/L (ref 10–40)
BACTERIA #/AREA URNS AUTO: ABNORMAL /HPF
BASOPHILS # BLD AUTO: 0.04 K/UL (ref 0–0.2)
BASOPHILS NFR BLD: 0.3 % (ref 0–1.9)
BILIRUB SERPL-MCNC: 0.6 MG/DL (ref 0.1–1)
BILIRUB UR QL STRIP: NEGATIVE
BLD GP AB SCN CELLS X3 SERPL QL: NORMAL
BUN SERPL-MCNC: 12 MG/DL (ref 8–23)
BUN SERPL-MCNC: 14 MG/DL (ref 6–30)
CALCIUM SERPL-MCNC: 9 MG/DL (ref 8.7–10.5)
CHLORIDE SERPL-SCNC: 103 MMOL/L (ref 95–110)
CHLORIDE SERPL-SCNC: 105 MMOL/L (ref 95–110)
CLARITY UR REFRACT.AUTO: CLEAR
CO2 SERPL-SCNC: 23 MMOL/L (ref 23–29)
COLOR UR AUTO: YELLOW
CREAT SERPL-MCNC: 0.8 MG/DL (ref 0.5–1.4)
CREAT SERPL-MCNC: 1 MG/DL (ref 0.5–1.4)
CTP QC/QA: YES
DIFFERENTIAL METHOD: ABNORMAL
EOSINOPHIL # BLD AUTO: 0 K/UL (ref 0–0.5)
EOSINOPHIL NFR BLD: 0.3 % (ref 0–8)
ERYTHROCYTE [DISTWIDTH] IN BLOOD BY AUTOMATED COUNT: 14.1 % (ref 11.5–14.5)
EST. GFR  (AFRICAN AMERICAN): >60 ML/MIN/1.73 M^2
EST. GFR  (NON AFRICAN AMERICAN): >60 ML/MIN/1.73 M^2
GLUCOSE SERPL-MCNC: 101 MG/DL (ref 70–110)
GLUCOSE SERPL-MCNC: 96 MG/DL (ref 70–110)
GLUCOSE UR QL STRIP: NEGATIVE
HCT VFR BLD AUTO: 38.7 % (ref 40–54)
HCT VFR BLD CALC: 41 %PCV (ref 36–54)
HGB BLD-MCNC: 12.5 G/DL (ref 14–18)
HGB UR QL STRIP: ABNORMAL
IMM GRANULOCYTES # BLD AUTO: 0.06 K/UL (ref 0–0.04)
IMM GRANULOCYTES NFR BLD AUTO: 0.5 % (ref 0–0.5)
INR PPP: 1 (ref 0.8–1.2)
KETONES UR QL STRIP: NEGATIVE
LACTATE SERPL-SCNC: 1 MMOL/L (ref 0.5–2.2)
LEUKOCYTE ESTERASE UR QL STRIP: NEGATIVE
LIPASE SERPL-CCNC: 19 U/L (ref 4–60)
LYMPHOCYTES # BLD AUTO: 1.4 K/UL (ref 1–4.8)
LYMPHOCYTES NFR BLD: 11.1 % (ref 18–48)
MCH RBC QN AUTO: 28.2 PG (ref 27–31)
MCHC RBC AUTO-ENTMCNC: 32.3 G/DL (ref 32–36)
MCV RBC AUTO: 87 FL (ref 82–98)
MICROSCOPIC COMMENT: ABNORMAL
MONOCYTES # BLD AUTO: 0.6 K/UL (ref 0.3–1)
MONOCYTES NFR BLD: 5.1 % (ref 4–15)
NEUTROPHILS # BLD AUTO: 10.2 K/UL (ref 1.8–7.7)
NEUTROPHILS NFR BLD: 82.7 % (ref 38–73)
NITRITE UR QL STRIP: NEGATIVE
NRBC BLD-RTO: 0 /100 WBC
PH UR STRIP: 6 [PH] (ref 5–8)
PLATELET # BLD AUTO: 186 K/UL (ref 150–450)
PMV BLD AUTO: 12.8 FL (ref 9.2–12.9)
POC IONIZED CALCIUM: 1.25 MMOL/L (ref 1.06–1.42)
POC TCO2 (MEASURED): 25 MMOL/L (ref 23–29)
POTASSIUM BLD-SCNC: 4.4 MMOL/L (ref 3.5–5.1)
POTASSIUM SERPL-SCNC: 4.5 MMOL/L (ref 3.5–5.1)
PROT SERPL-MCNC: 6.9 G/DL (ref 6–8.4)
PROT UR QL STRIP: NEGATIVE
PROTHROMBIN TIME: 10.7 SEC (ref 9–12.5)
RBC # BLD AUTO: 4.44 M/UL (ref 4.6–6.2)
RBC #/AREA URNS AUTO: 65 /HPF (ref 0–4)
SAMPLE: NORMAL
SARS-COV-2 RDRP RESP QL NAA+PROBE: NEGATIVE
SODIUM BLD-SCNC: 140 MMOL/L (ref 136–145)
SODIUM SERPL-SCNC: 138 MMOL/L (ref 136–145)
SP GR UR STRIP: 1.02 (ref 1–1.03)
TROPONIN I SERPL DL<=0.01 NG/ML-MCNC: <0.006 NG/ML (ref 0–0.03)
URN SPEC COLLECT METH UR: ABNORMAL
WBC # BLD AUTO: 12.37 K/UL (ref 3.9–12.7)
WBC #/AREA URNS AUTO: 1 /HPF (ref 0–5)

## 2021-09-15 PROCEDURE — 25000003 PHARM REV CODE 250: Performed by: PHYSICIAN ASSISTANT

## 2021-09-15 PROCEDURE — 80053 COMPREHEN METABOLIC PANEL: CPT | Performed by: PHYSICIAN ASSISTANT

## 2021-09-15 PROCEDURE — 93005 ELECTROCARDIOGRAM TRACING: CPT

## 2021-09-15 PROCEDURE — 96361 HYDRATE IV INFUSION ADD-ON: CPT

## 2021-09-15 PROCEDURE — 96374 THER/PROPH/DIAG INJ IV PUSH: CPT

## 2021-09-15 PROCEDURE — 96375 TX/PRO/DX INJ NEW DRUG ADDON: CPT

## 2021-09-15 PROCEDURE — 63600175 PHARM REV CODE 636 W HCPCS: Performed by: PHYSICIAN ASSISTANT

## 2021-09-15 PROCEDURE — 84484 ASSAY OF TROPONIN QUANT: CPT

## 2021-09-15 PROCEDURE — 12000002 HC ACUTE/MED SURGE SEMI-PRIVATE ROOM

## 2021-09-15 PROCEDURE — 99291 PR CRITICAL CARE, E/M 30-74 MINUTES: ICD-10-PCS | Mod: ,,, | Performed by: EMERGENCY MEDICINE

## 2021-09-15 PROCEDURE — 25000003 PHARM REV CODE 250

## 2021-09-15 PROCEDURE — 81001 URINALYSIS AUTO W/SCOPE: CPT | Performed by: PHYSICIAN ASSISTANT

## 2021-09-15 PROCEDURE — 99285 PR EMERGENCY DEPT VISIT,LEVEL V: ICD-10-PCS | Mod: CS,,, | Performed by: PHYSICIAN ASSISTANT

## 2021-09-15 PROCEDURE — 85025 COMPLETE CBC W/AUTO DIFF WBC: CPT | Performed by: PHYSICIAN ASSISTANT

## 2021-09-15 PROCEDURE — 85610 PROTHROMBIN TIME: CPT | Performed by: PHYSICIAN ASSISTANT

## 2021-09-15 PROCEDURE — 93010 ELECTROCARDIOGRAM REPORT: CPT | Mod: ,,, | Performed by: INTERNAL MEDICINE

## 2021-09-15 PROCEDURE — 99291 CRITICAL CARE FIRST HOUR: CPT | Mod: ,,, | Performed by: EMERGENCY MEDICINE

## 2021-09-15 PROCEDURE — 99285 EMERGENCY DEPT VISIT HI MDM: CPT | Mod: CS,,, | Performed by: PHYSICIAN ASSISTANT

## 2021-09-15 PROCEDURE — 83690 ASSAY OF LIPASE: CPT | Performed by: PHYSICIAN ASSISTANT

## 2021-09-15 PROCEDURE — 93010 EKG 12-LEAD: ICD-10-PCS | Mod: ,,, | Performed by: INTERNAL MEDICINE

## 2021-09-15 PROCEDURE — 99285 EMERGENCY DEPT VISIT HI MDM: CPT | Mod: 25

## 2021-09-15 PROCEDURE — 83605 ASSAY OF LACTIC ACID: CPT

## 2021-09-15 PROCEDURE — 80047 BASIC METABLC PNL IONIZED CA: CPT

## 2021-09-15 PROCEDURE — 25000003 PHARM REV CODE 250: Performed by: EMERGENCY MEDICINE

## 2021-09-15 PROCEDURE — U0002 COVID-19 LAB TEST NON-CDC: HCPCS | Performed by: NURSE PRACTITIONER

## 2021-09-15 PROCEDURE — 86900 BLOOD TYPING SEROLOGIC ABO: CPT | Performed by: PHYSICIAN ASSISTANT

## 2021-09-15 PROCEDURE — 25500020 PHARM REV CODE 255: Performed by: EMERGENCY MEDICINE

## 2021-09-15 PROCEDURE — 85730 THROMBOPLASTIN TIME PARTIAL: CPT | Performed by: PHYSICIAN ASSISTANT

## 2021-09-15 RX ORDER — ONDANSETRON 2 MG/ML
4 INJECTION INTRAMUSCULAR; INTRAVENOUS
Status: COMPLETED | OUTPATIENT
Start: 2021-09-15 | End: 2021-09-15

## 2021-09-15 RX ORDER — ESMOLOL HYDROCHLORIDE 20 MG/ML
0-300 INJECTION, SOLUTION INTRAVENOUS CONTINUOUS
Status: DISCONTINUED | OUTPATIENT
Start: 2021-09-15 | End: 2021-09-15

## 2021-09-15 RX ORDER — AMOXICILLIN 250 MG
1 CAPSULE ORAL DAILY PRN
Status: DISCONTINUED | OUTPATIENT
Start: 2021-09-15 | End: 2021-09-16 | Stop reason: HOSPADM

## 2021-09-15 RX ORDER — POLYETHYLENE GLYCOL 3350 17 G/17G
17 POWDER, FOR SOLUTION ORAL DAILY
Status: DISCONTINUED | OUTPATIENT
Start: 2021-09-16 | End: 2021-09-16 | Stop reason: HOSPADM

## 2021-09-15 RX ORDER — AMLODIPINE BESYLATE 5 MG/1
5 TABLET ORAL DAILY
Status: DISCONTINUED | OUTPATIENT
Start: 2021-09-15 | End: 2021-09-15

## 2021-09-15 RX ORDER — AMLODIPINE BESYLATE 10 MG/1
10 TABLET ORAL DAILY
Status: DISCONTINUED | OUTPATIENT
Start: 2021-09-15 | End: 2021-09-16 | Stop reason: HOSPADM

## 2021-09-15 RX ORDER — ATORVASTATIN CALCIUM 20 MG/1
40 TABLET, FILM COATED ORAL DAILY
Status: DISCONTINUED | OUTPATIENT
Start: 2021-09-16 | End: 2021-09-16 | Stop reason: HOSPADM

## 2021-09-15 RX ORDER — AMLODIPINE BESYLATE 5 MG/1
5 TABLET ORAL DAILY
Status: DISCONTINUED | OUTPATIENT
Start: 2021-09-16 | End: 2021-09-15

## 2021-09-15 RX ORDER — MORPHINE SULFATE 4 MG/ML
4 INJECTION, SOLUTION INTRAMUSCULAR; INTRAVENOUS
Status: COMPLETED | OUTPATIENT
Start: 2021-09-15 | End: 2021-09-15

## 2021-09-15 RX ORDER — TAMSULOSIN HYDROCHLORIDE 0.4 MG/1
0.4 CAPSULE ORAL DAILY
Status: DISCONTINUED | OUTPATIENT
Start: 2021-09-15 | End: 2021-09-16 | Stop reason: HOSPADM

## 2021-09-15 RX ORDER — NICARDIPINE HYDROCHLORIDE 0.2 MG/ML
0-15 INJECTION INTRAVENOUS CONTINUOUS
Status: DISCONTINUED | OUTPATIENT
Start: 2021-09-15 | End: 2021-09-16

## 2021-09-15 RX ORDER — KETOROLAC TROMETHAMINE 30 MG/ML
10 INJECTION, SOLUTION INTRAMUSCULAR; INTRAVENOUS
Status: COMPLETED | OUTPATIENT
Start: 2021-09-15 | End: 2021-09-15

## 2021-09-15 RX ORDER — LISINOPRIL 20 MG/1
40 TABLET ORAL DAILY
Status: DISCONTINUED | OUTPATIENT
Start: 2021-09-16 | End: 2021-09-16

## 2021-09-15 RX ORDER — HYDROMORPHONE HYDROCHLORIDE 1 MG/ML
1 INJECTION, SOLUTION INTRAMUSCULAR; INTRAVENOUS; SUBCUTANEOUS
Status: DISCONTINUED | OUTPATIENT
Start: 2021-09-15 | End: 2021-09-15

## 2021-09-15 RX ORDER — LISINOPRIL 10 MG/1
10 TABLET ORAL DAILY
Status: DISCONTINUED | OUTPATIENT
Start: 2021-09-16 | End: 2021-09-15

## 2021-09-15 RX ADMIN — NICARDIPINE HYDROCHLORIDE 15 MG/HR: 0.2 INJECTION, SOLUTION INTRAVENOUS at 10:09

## 2021-09-15 RX ADMIN — SODIUM CHLORIDE 1000 ML: 0.9 INJECTION, SOLUTION INTRAVENOUS at 04:09

## 2021-09-15 RX ADMIN — NICARDIPINE HYDROCHLORIDE 5 MG/HR: 0.2 INJECTION, SOLUTION INTRAVENOUS at 04:09

## 2021-09-15 RX ADMIN — SODIUM CHLORIDE 500 ML: 0.9 INJECTION, SOLUTION INTRAVENOUS at 02:09

## 2021-09-15 RX ADMIN — ONDANSETRON 4 MG: 2 INJECTION INTRAMUSCULAR; INTRAVENOUS at 04:09

## 2021-09-15 RX ADMIN — MORPHINE SULFATE 4 MG: 4 INJECTION INTRAVENOUS at 04:09

## 2021-09-15 RX ADMIN — KETOROLAC TROMETHAMINE 10 MG: 30 INJECTION, SOLUTION INTRAMUSCULAR; INTRAVENOUS at 02:09

## 2021-09-15 RX ADMIN — NICARDIPINE HYDROCHLORIDE 15 MG/HR: 0.2 INJECTION, SOLUTION INTRAVENOUS at 07:09

## 2021-09-15 RX ADMIN — AMLODIPINE BESYLATE 10 MG: 10 TABLET ORAL at 07:09

## 2021-09-15 RX ADMIN — IOHEXOL 100 ML: 350 INJECTION, SOLUTION INTRAVENOUS at 03:09

## 2021-09-15 RX ADMIN — TAMSULOSIN HYDROCHLORIDE 0.4 MG: 0.4 CAPSULE ORAL at 10:09

## 2021-09-16 ENCOUNTER — TELEPHONE (OUTPATIENT)
Dept: UROLOGY | Facility: CLINIC | Age: 71
End: 2021-09-16

## 2021-09-16 VITALS
SYSTOLIC BLOOD PRESSURE: 111 MMHG | BODY MASS INDEX: 27.16 KG/M2 | HEIGHT: 71 IN | WEIGHT: 194 LBS | OXYGEN SATURATION: 96 % | HEART RATE: 63 BPM | TEMPERATURE: 98 F | DIASTOLIC BLOOD PRESSURE: 67 MMHG | RESPIRATION RATE: 13 BRPM

## 2021-09-16 LAB
ALBUMIN SERPL BCP-MCNC: 3 G/DL (ref 3.5–5.2)
ALP SERPL-CCNC: 59 U/L (ref 55–135)
ALT SERPL W/O P-5'-P-CCNC: 10 U/L (ref 10–44)
ANION GAP SERPL CALC-SCNC: 9 MMOL/L (ref 8–16)
AST SERPL-CCNC: 12 U/L (ref 10–40)
BASOPHILS # BLD AUTO: 0.04 K/UL (ref 0–0.2)
BASOPHILS NFR BLD: 0.7 % (ref 0–1.9)
BILIRUB SERPL-MCNC: 0.5 MG/DL (ref 0.1–1)
BUN SERPL-MCNC: 13 MG/DL (ref 8–23)
CALCIUM SERPL-MCNC: 8.2 MG/DL (ref 8.7–10.5)
CHLORIDE SERPL-SCNC: 109 MMOL/L (ref 95–110)
CO2 SERPL-SCNC: 21 MMOL/L (ref 23–29)
CREAT SERPL-MCNC: 1 MG/DL (ref 0.5–1.4)
DIFFERENTIAL METHOD: ABNORMAL
EOSINOPHIL # BLD AUTO: 0.1 K/UL (ref 0–0.5)
EOSINOPHIL NFR BLD: 1.8 % (ref 0–8)
ERYTHROCYTE [DISTWIDTH] IN BLOOD BY AUTOMATED COUNT: 14.1 % (ref 11.5–14.5)
EST. GFR  (AFRICAN AMERICAN): >60 ML/MIN/1.73 M^2
EST. GFR  (NON AFRICAN AMERICAN): >60 ML/MIN/1.73 M^2
GLUCOSE SERPL-MCNC: 90 MG/DL (ref 70–110)
HCT VFR BLD AUTO: 32.5 % (ref 40–54)
HGB BLD-MCNC: 10.5 G/DL (ref 14–18)
IMM GRANULOCYTES # BLD AUTO: 0.02 K/UL (ref 0–0.04)
IMM GRANULOCYTES NFR BLD AUTO: 0.3 % (ref 0–0.5)
LYMPHOCYTES # BLD AUTO: 1.2 K/UL (ref 1–4.8)
LYMPHOCYTES NFR BLD: 19.1 % (ref 18–48)
MCH RBC QN AUTO: 28.2 PG (ref 27–31)
MCHC RBC AUTO-ENTMCNC: 32.3 G/DL (ref 32–36)
MCV RBC AUTO: 87 FL (ref 82–98)
MONOCYTES # BLD AUTO: 0.6 K/UL (ref 0.3–1)
MONOCYTES NFR BLD: 10.3 % (ref 4–15)
NEUTROPHILS # BLD AUTO: 4.2 K/UL (ref 1.8–7.7)
NEUTROPHILS NFR BLD: 67.8 % (ref 38–73)
NRBC BLD-RTO: 0 /100 WBC
PLATELET # BLD AUTO: 148 K/UL (ref 150–450)
PMV BLD AUTO: 12.5 FL (ref 9.2–12.9)
POCT GLUCOSE: 78 MG/DL (ref 70–110)
POTASSIUM SERPL-SCNC: 3.7 MMOL/L (ref 3.5–5.1)
PROT SERPL-MCNC: 5.5 G/DL (ref 6–8.4)
RBC # BLD AUTO: 3.72 M/UL (ref 4.6–6.2)
SODIUM SERPL-SCNC: 139 MMOL/L (ref 136–145)
WBC # BLD AUTO: 6.13 K/UL (ref 3.9–12.7)

## 2021-09-16 PROCEDURE — 99238 HOSP IP/OBS DSCHRG MGMT 30/<: CPT | Mod: ,,, | Performed by: EMERGENCY MEDICINE

## 2021-09-16 PROCEDURE — 85025 COMPLETE CBC W/AUTO DIFF WBC: CPT | Performed by: STUDENT IN AN ORGANIZED HEALTH CARE EDUCATION/TRAINING PROGRAM

## 2021-09-16 PROCEDURE — 25000003 PHARM REV CODE 250: Performed by: EMERGENCY MEDICINE

## 2021-09-16 PROCEDURE — 27000221 HC OXYGEN, UP TO 24 HOURS

## 2021-09-16 PROCEDURE — 94761 N-INVAS EAR/PLS OXIMETRY MLT: CPT

## 2021-09-16 PROCEDURE — 25000003 PHARM REV CODE 250

## 2021-09-16 PROCEDURE — 99238 PR HOSPITAL DISCHARGE DAY,<30 MIN: ICD-10-PCS | Mod: ,,, | Performed by: EMERGENCY MEDICINE

## 2021-09-16 PROCEDURE — 80053 COMPREHEN METABOLIC PANEL: CPT | Performed by: STUDENT IN AN ORGANIZED HEALTH CARE EDUCATION/TRAINING PROGRAM

## 2021-09-16 PROCEDURE — 25000003 PHARM REV CODE 250: Performed by: STUDENT IN AN ORGANIZED HEALTH CARE EDUCATION/TRAINING PROGRAM

## 2021-09-16 RX ORDER — POTASSIUM CHLORIDE 20 MEQ/1
40 TABLET, EXTENDED RELEASE ORAL ONCE
Status: DISCONTINUED | OUTPATIENT
Start: 2021-09-16 | End: 2021-09-16 | Stop reason: HOSPADM

## 2021-09-16 RX ORDER — ENOXAPARIN SODIUM 100 MG/ML
40 INJECTION SUBCUTANEOUS EVERY 24 HOURS
Status: DISCONTINUED | OUTPATIENT
Start: 2021-09-16 | End: 2021-09-16 | Stop reason: HOSPADM

## 2021-09-16 RX ORDER — HYDROCODONE BITARTRATE AND ACETAMINOPHEN 5; 325 MG/1; MG/1
1 TABLET ORAL EVERY 6 HOURS PRN
Qty: 12 TABLET | Refills: 0 | Status: SHIPPED | OUTPATIENT
Start: 2021-09-16 | End: 2021-09-16 | Stop reason: SDUPTHER

## 2021-09-16 RX ORDER — SODIUM CHLORIDE 0.9 % (FLUSH) 0.9 %
10 SYRINGE (ML) INJECTION
Status: DISCONTINUED | OUTPATIENT
Start: 2021-09-16 | End: 2021-09-16 | Stop reason: HOSPADM

## 2021-09-16 RX ORDER — HYDROCODONE BITARTRATE AND ACETAMINOPHEN 5; 325 MG/1; MG/1
1 TABLET ORAL EVERY 6 HOURS PRN
Qty: 12 TABLET | Refills: 0
Start: 2021-09-16 | End: 2021-09-16 | Stop reason: SDUPTHER

## 2021-09-16 RX ORDER — ACETAMINOPHEN 325 MG/1
650 TABLET ORAL EVERY 6 HOURS PRN
Status: DISCONTINUED | OUTPATIENT
Start: 2021-09-16 | End: 2021-09-16 | Stop reason: HOSPADM

## 2021-09-16 RX ORDER — ATORVASTATIN CALCIUM 40 MG/1
40 TABLET, FILM COATED ORAL DAILY
Qty: 90 TABLET | Refills: 3 | Status: SHIPPED | OUTPATIENT
Start: 2021-09-17 | End: 2022-10-24

## 2021-09-16 RX ORDER — LISINOPRIL 40 MG/1
40 TABLET ORAL DAILY
Qty: 90 TABLET | Refills: 3 | Status: SHIPPED | OUTPATIENT
Start: 2021-09-16 | End: 2021-09-16 | Stop reason: HOSPADM

## 2021-09-16 RX ORDER — TAMSULOSIN HYDROCHLORIDE 0.4 MG/1
0.4 CAPSULE ORAL DAILY
Qty: 30 CAPSULE | Refills: 11 | Status: SHIPPED | OUTPATIENT
Start: 2021-09-17 | End: 2022-05-04

## 2021-09-16 RX ORDER — AMLODIPINE BESYLATE 10 MG/1
10 TABLET ORAL DAILY
Qty: 30 TABLET | Refills: 11 | Status: SHIPPED | OUTPATIENT
Start: 2021-09-17 | End: 2021-09-22

## 2021-09-16 RX ORDER — HYDROCODONE BITARTRATE AND ACETAMINOPHEN 5; 325 MG/1; MG/1
1 TABLET ORAL EVERY 6 HOURS PRN
Qty: 12 TABLET | Refills: 0 | Status: SHIPPED | OUTPATIENT
Start: 2021-09-16 | End: 2021-10-08 | Stop reason: SDUPTHER

## 2021-09-16 RX ADMIN — ACETAMINOPHEN 650 MG: 325 TABLET ORAL at 09:09

## 2021-09-16 RX ADMIN — ATORVASTATIN CALCIUM 40 MG: 20 TABLET, FILM COATED ORAL at 09:09

## 2021-09-16 RX ADMIN — TAMSULOSIN HYDROCHLORIDE 0.4 MG: 0.4 CAPSULE ORAL at 09:09

## 2021-09-22 ENCOUNTER — OFFICE VISIT (OUTPATIENT)
Dept: INTERNAL MEDICINE | Facility: CLINIC | Age: 71
End: 2021-09-22
Attending: INTERNAL MEDICINE
Payer: MEDICARE

## 2021-09-22 VITALS
DIASTOLIC BLOOD PRESSURE: 80 MMHG | SYSTOLIC BLOOD PRESSURE: 112 MMHG | OXYGEN SATURATION: 98 % | HEIGHT: 71 IN | HEART RATE: 76 BPM | WEIGHT: 184 LBS | BODY MASS INDEX: 25.76 KG/M2

## 2021-09-22 DIAGNOSIS — N20.1 URETERAL STONE: ICD-10-CM

## 2021-09-22 DIAGNOSIS — N28.1 ACQUIRED COMPLEX RENAL CYST: ICD-10-CM

## 2021-09-22 DIAGNOSIS — M25.562 CHRONIC PAIN OF LEFT KNEE: ICD-10-CM

## 2021-09-22 DIAGNOSIS — I10 ESSENTIAL HYPERTENSION: Primary | ICD-10-CM

## 2021-09-22 DIAGNOSIS — R60.9 EDEMA, UNSPECIFIED TYPE: ICD-10-CM

## 2021-09-22 DIAGNOSIS — I77.72 DISSECTION OF RIGHT ILIAC ARTERY: ICD-10-CM

## 2021-09-22 DIAGNOSIS — E78.5 DYSLIPIDEMIA: ICD-10-CM

## 2021-09-22 DIAGNOSIS — G89.29 CHRONIC PAIN OF LEFT KNEE: ICD-10-CM

## 2021-09-22 PROCEDURE — 1160F RVW MEDS BY RX/DR IN RCRD: CPT | Mod: CPTII,S$GLB,, | Performed by: INTERNAL MEDICINE

## 2021-09-22 PROCEDURE — 4010F PR ACE/ARB THEARPY RXD/TAKEN: ICD-10-PCS | Mod: CPTII,S$GLB,, | Performed by: INTERNAL MEDICINE

## 2021-09-22 PROCEDURE — 1159F PR MEDICATION LIST DOCUMENTED IN MEDICAL RECORD: ICD-10-PCS | Mod: CPTII,S$GLB,, | Performed by: INTERNAL MEDICINE

## 2021-09-22 PROCEDURE — 3044F HG A1C LEVEL LT 7.0%: CPT | Mod: CPTII,S$GLB,, | Performed by: INTERNAL MEDICINE

## 2021-09-22 PROCEDURE — 3079F PR MOST RECENT DIASTOLIC BLOOD PRESSURE 80-89 MM HG: ICD-10-PCS | Mod: CPTII,S$GLB,, | Performed by: INTERNAL MEDICINE

## 2021-09-22 PROCEDURE — 1159F MED LIST DOCD IN RCRD: CPT | Mod: CPTII,S$GLB,, | Performed by: INTERNAL MEDICINE

## 2021-09-22 PROCEDURE — 3074F PR MOST RECENT SYSTOLIC BLOOD PRESSURE < 130 MM HG: ICD-10-PCS | Mod: CPTII,S$GLB,, | Performed by: INTERNAL MEDICINE

## 2021-09-22 PROCEDURE — 1160F PR REVIEW ALL MEDS BY PRESCRIBER/CLIN PHARMACIST DOCUMENTED: ICD-10-PCS | Mod: CPTII,S$GLB,, | Performed by: INTERNAL MEDICINE

## 2021-09-22 PROCEDURE — 4010F ACE/ARB THERAPY RXD/TAKEN: CPT | Mod: CPTII,S$GLB,, | Performed by: INTERNAL MEDICINE

## 2021-09-22 PROCEDURE — 99214 PR OFFICE/OUTPT VISIT, EST, LEVL IV, 30-39 MIN: ICD-10-PCS | Mod: S$GLB,,, | Performed by: INTERNAL MEDICINE

## 2021-09-22 PROCEDURE — 99214 OFFICE O/P EST MOD 30 MIN: CPT | Mod: S$GLB,,, | Performed by: INTERNAL MEDICINE

## 2021-09-22 PROCEDURE — 3044F PR MOST RECENT HEMOGLOBIN A1C LEVEL <7.0%: ICD-10-PCS | Mod: CPTII,S$GLB,, | Performed by: INTERNAL MEDICINE

## 2021-09-22 PROCEDURE — 3008F BODY MASS INDEX DOCD: CPT | Mod: CPTII,S$GLB,, | Performed by: INTERNAL MEDICINE

## 2021-09-22 PROCEDURE — 3074F SYST BP LT 130 MM HG: CPT | Mod: CPTII,S$GLB,, | Performed by: INTERNAL MEDICINE

## 2021-09-22 PROCEDURE — 3079F DIAST BP 80-89 MM HG: CPT | Mod: CPTII,S$GLB,, | Performed by: INTERNAL MEDICINE

## 2021-09-22 PROCEDURE — 3008F PR BODY MASS INDEX (BMI) DOCUMENTED: ICD-10-PCS | Mod: CPTII,S$GLB,, | Performed by: INTERNAL MEDICINE

## 2021-09-22 RX ORDER — LISINOPRIL 10 MG/1
10 TABLET ORAL DAILY
Qty: 90 TABLET | Refills: 3
Start: 2021-09-22 | End: 2021-11-11

## 2021-09-22 RX ORDER — AMLODIPINE BESYLATE 10 MG/1
5 TABLET ORAL DAILY
Qty: 15 TABLET | Refills: 5 | Status: SHIPPED | OUTPATIENT
Start: 2021-09-22 | End: 2021-09-22

## 2021-09-22 RX ORDER — AMLODIPINE BESYLATE 5 MG/1
5 TABLET ORAL DAILY
Qty: 30 TABLET | Refills: 3 | Status: SHIPPED | OUTPATIENT
Start: 2021-09-22 | End: 2022-05-04 | Stop reason: SDUPTHER

## 2021-10-08 RX ORDER — HYDROCODONE BITARTRATE AND ACETAMINOPHEN 5; 325 MG/1; MG/1
1 TABLET ORAL EVERY 8 HOURS PRN
Qty: 20 TABLET | Refills: 0 | Status: SHIPPED | OUTPATIENT
Start: 2021-10-08 | End: 2021-10-15

## 2021-10-15 ENCOUNTER — HOSPITAL ENCOUNTER (EMERGENCY)
Facility: HOSPITAL | Age: 71
Discharge: HOME | End: 2021-10-15
Attending: EMERGENCY MEDICINE
Payer: MEDICARE

## 2021-10-15 VITALS
WEIGHT: 190 LBS | DIASTOLIC BLOOD PRESSURE: 85 MMHG | SYSTOLIC BLOOD PRESSURE: 154 MMHG | OXYGEN SATURATION: 99 % | HEIGHT: 71 IN | HEART RATE: 81 BPM | TEMPERATURE: 99 F | BODY MASS INDEX: 26.6 KG/M2 | RESPIRATION RATE: 18 BRPM

## 2021-10-15 DIAGNOSIS — H20.9 TRAUMATIC IRITIS: ICD-10-CM

## 2021-10-15 DIAGNOSIS — S05.02XA ABRASION OF LEFT CORNEA, INITIAL ENCOUNTER: Primary | ICD-10-CM

## 2021-10-15 PROCEDURE — 99284 PR EMERGENCY DEPT VISIT,LEVEL IV: ICD-10-PCS | Mod: ,,, | Performed by: EMERGENCY MEDICINE

## 2021-10-15 PROCEDURE — 90471 IMMUNIZATION ADMIN: CPT | Performed by: STUDENT IN AN ORGANIZED HEALTH CARE EDUCATION/TRAINING PROGRAM

## 2021-10-15 PROCEDURE — 63600175 PHARM REV CODE 636 W HCPCS: Performed by: STUDENT IN AN ORGANIZED HEALTH CARE EDUCATION/TRAINING PROGRAM

## 2021-10-15 PROCEDURE — 90715 TDAP VACCINE 7 YRS/> IM: CPT | Performed by: STUDENT IN AN ORGANIZED HEALTH CARE EDUCATION/TRAINING PROGRAM

## 2021-10-15 PROCEDURE — 99284 EMERGENCY DEPT VISIT MOD MDM: CPT | Mod: 25

## 2021-10-15 PROCEDURE — 99284 EMERGENCY DEPT VISIT MOD MDM: CPT | Mod: ,,, | Performed by: EMERGENCY MEDICINE

## 2021-10-15 PROCEDURE — 25000003 PHARM REV CODE 250: Performed by: EMERGENCY MEDICINE

## 2021-10-15 RX ORDER — ERYTHROMYCIN 5 MG/G
OINTMENT OPHTHALMIC
Qty: 1 TUBE | Refills: 0 | Status: SHIPPED | OUTPATIENT
Start: 2021-10-15 | End: 2021-10-20

## 2021-10-15 RX ORDER — TETRACAINE HYDROCHLORIDE 5 MG/ML
2 SOLUTION OPHTHALMIC
Status: COMPLETED | OUTPATIENT
Start: 2021-10-15 | End: 2021-10-15

## 2021-10-15 RX ADMIN — CLOSTRIDIUM TETANI TOXOID ANTIGEN (FORMALDEHYDE INACTIVATED), CORYNEBACTERIUM DIPHTHERIAE TOXOID ANTIGEN (FORMALDEHYDE INACTIVATED), BORDETELLA PERTUSSIS TOXOID ANTIGEN (GLUTARALDEHYDE INACTIVATED), BORDETELLA PERTUSSIS FILAMENTOUS HEMAGGLUTININ ANTIGEN (FORMALDEHYDE INACTIVATED), BORDETELLA PERTUSSIS PERTACTIN ANTIGEN, AND BORDETELLA PERTUSSIS FIMBRIAE 2/3 ANTIGEN 0.5 ML: 5; 2; 2.5; 5; 3; 5 INJECTION, SUSPENSION INTRAMUSCULAR at 10:10

## 2021-10-15 RX ADMIN — FLUORESCEIN SODIUM 1 EACH: 1 STRIP OPHTHALMIC at 08:10

## 2021-10-15 RX ADMIN — TETRACAINE HYDROCHLORIDE 2 DROP: 5 SOLUTION OPHTHALMIC at 08:10

## 2021-10-16 ENCOUNTER — TELEPHONE (OUTPATIENT)
Dept: OPHTHALMOLOGY | Facility: CLINIC | Age: 71
End: 2021-10-16

## 2021-10-18 ENCOUNTER — PES CALL (OUTPATIENT)
Dept: ADMINISTRATIVE | Facility: CLINIC | Age: 71
End: 2021-10-18

## 2021-10-22 DIAGNOSIS — M25.571 CHRONIC PAIN OF RIGHT ANKLE: Primary | ICD-10-CM

## 2021-10-22 DIAGNOSIS — G89.29 CHRONIC PAIN OF RIGHT ANKLE: Primary | ICD-10-CM

## 2021-10-22 RX ORDER — HYDROCODONE BITARTRATE AND ACETAMINOPHEN 5; 325 MG/1; MG/1
1 TABLET ORAL EVERY 8 HOURS PRN
Qty: 12 TABLET | Refills: 0 | OUTPATIENT
Start: 2021-10-22

## 2021-11-11 ENCOUNTER — OFFICE VISIT (OUTPATIENT)
Dept: INTERNAL MEDICINE | Facility: CLINIC | Age: 71
End: 2021-11-11
Attending: INTERNAL MEDICINE
Payer: MEDICARE

## 2021-11-11 VITALS
HEIGHT: 71 IN | SYSTOLIC BLOOD PRESSURE: 130 MMHG | WEIGHT: 181 LBS | HEART RATE: 75 BPM | DIASTOLIC BLOOD PRESSURE: 88 MMHG | OXYGEN SATURATION: 98 % | BODY MASS INDEX: 25.34 KG/M2

## 2021-11-11 DIAGNOSIS — R73.03 PREDIABETES: ICD-10-CM

## 2021-11-11 DIAGNOSIS — I10 ESSENTIAL HYPERTENSION: ICD-10-CM

## 2021-11-11 DIAGNOSIS — Z12.11 COLON CANCER SCREENING: ICD-10-CM

## 2021-11-11 DIAGNOSIS — G89.29 CHRONIC PAIN OF LEFT KNEE: ICD-10-CM

## 2021-11-11 DIAGNOSIS — E78.5 DYSLIPIDEMIA: Primary | ICD-10-CM

## 2021-11-11 DIAGNOSIS — R52 PAIN: ICD-10-CM

## 2021-11-11 DIAGNOSIS — M25.571 CHRONIC PAIN OF RIGHT ANKLE: ICD-10-CM

## 2021-11-11 DIAGNOSIS — M25.562 CHRONIC PAIN OF LEFT KNEE: ICD-10-CM

## 2021-11-11 DIAGNOSIS — G89.29 CHRONIC PAIN OF RIGHT ANKLE: ICD-10-CM

## 2021-11-11 DIAGNOSIS — N20.1 URETERAL STONE: ICD-10-CM

## 2021-11-11 PROCEDURE — 1160F PR REVIEW ALL MEDS BY PRESCRIBER/CLIN PHARMACIST DOCUMENTED: ICD-10-PCS | Mod: CPTII,S$GLB,, | Performed by: INTERNAL MEDICINE

## 2021-11-11 PROCEDURE — 99214 PR OFFICE/OUTPT VISIT, EST, LEVL IV, 30-39 MIN: ICD-10-PCS | Mod: 25,S$GLB,, | Performed by: INTERNAL MEDICINE

## 2021-11-11 PROCEDURE — 90694 VACC AIIV4 NO PRSRV 0.5ML IM: CPT | Mod: S$GLB,,, | Performed by: INTERNAL MEDICINE

## 2021-11-11 PROCEDURE — 3044F HG A1C LEVEL LT 7.0%: CPT | Mod: CPTII,S$GLB,, | Performed by: INTERNAL MEDICINE

## 2021-11-11 PROCEDURE — G0008 ADMIN INFLUENZA VIRUS VAC: HCPCS | Mod: S$GLB,,, | Performed by: INTERNAL MEDICINE

## 2021-11-11 PROCEDURE — 1159F MED LIST DOCD IN RCRD: CPT | Mod: CPTII,S$GLB,, | Performed by: INTERNAL MEDICINE

## 2021-11-11 PROCEDURE — G0008 FLU VACCINE - QUADRIVALENT - ADJUVANTED: ICD-10-PCS | Mod: S$GLB,,, | Performed by: INTERNAL MEDICINE

## 2021-11-11 PROCEDURE — 4010F ACE/ARB THERAPY RXD/TAKEN: CPT | Mod: CPTII,S$GLB,, | Performed by: INTERNAL MEDICINE

## 2021-11-11 PROCEDURE — 3044F PR MOST RECENT HEMOGLOBIN A1C LEVEL <7.0%: ICD-10-PCS | Mod: CPTII,S$GLB,, | Performed by: INTERNAL MEDICINE

## 2021-11-11 PROCEDURE — 3008F BODY MASS INDEX DOCD: CPT | Mod: CPTII,S$GLB,, | Performed by: INTERNAL MEDICINE

## 2021-11-11 PROCEDURE — 99214 OFFICE O/P EST MOD 30 MIN: CPT | Mod: 25,S$GLB,, | Performed by: INTERNAL MEDICINE

## 2021-11-11 PROCEDURE — 3079F PR MOST RECENT DIASTOLIC BLOOD PRESSURE 80-89 MM HG: ICD-10-PCS | Mod: CPTII,S$GLB,, | Performed by: INTERNAL MEDICINE

## 2021-11-11 PROCEDURE — 3075F SYST BP GE 130 - 139MM HG: CPT | Mod: CPTII,S$GLB,, | Performed by: INTERNAL MEDICINE

## 2021-11-11 PROCEDURE — 1159F PR MEDICATION LIST DOCUMENTED IN MEDICAL RECORD: ICD-10-PCS | Mod: CPTII,S$GLB,, | Performed by: INTERNAL MEDICINE

## 2021-11-11 PROCEDURE — 3075F PR MOST RECENT SYSTOLIC BLOOD PRESS GE 130-139MM HG: ICD-10-PCS | Mod: CPTII,S$GLB,, | Performed by: INTERNAL MEDICINE

## 2021-11-11 PROCEDURE — 3079F DIAST BP 80-89 MM HG: CPT | Mod: CPTII,S$GLB,, | Performed by: INTERNAL MEDICINE

## 2021-11-11 PROCEDURE — 90694 FLU VACCINE - QUADRIVALENT - ADJUVANTED: ICD-10-PCS | Mod: S$GLB,,, | Performed by: INTERNAL MEDICINE

## 2021-11-11 PROCEDURE — 1160F RVW MEDS BY RX/DR IN RCRD: CPT | Mod: CPTII,S$GLB,, | Performed by: INTERNAL MEDICINE

## 2021-11-11 PROCEDURE — 4010F PR ACE/ARB THEARPY RXD/TAKEN: ICD-10-PCS | Mod: CPTII,S$GLB,, | Performed by: INTERNAL MEDICINE

## 2021-11-11 PROCEDURE — 3008F PR BODY MASS INDEX (BMI) DOCUMENTED: ICD-10-PCS | Mod: CPTII,S$GLB,, | Performed by: INTERNAL MEDICINE

## 2021-11-11 RX ORDER — LISINOPRIL 20 MG/1
20 TABLET ORAL DAILY
Qty: 90 TABLET | Refills: 3 | Status: SHIPPED | OUTPATIENT
Start: 2021-11-11 | End: 2023-01-24

## 2021-11-11 RX ORDER — MELOXICAM 15 MG/1
15 TABLET ORAL DAILY PRN
Qty: 90 TABLET | Refills: 0 | Status: SHIPPED | OUTPATIENT
Start: 2021-11-11 | End: 2022-05-04 | Stop reason: SDUPTHER

## 2021-11-23 ENCOUNTER — PES CALL (OUTPATIENT)
Dept: ADMINISTRATIVE | Facility: CLINIC | Age: 71
End: 2021-11-23
Payer: MEDICARE

## 2021-12-02 ENCOUNTER — IMMUNIZATION (OUTPATIENT)
Dept: INTERNAL MEDICINE | Facility: CLINIC | Age: 71
End: 2021-12-02
Payer: MEDICARE

## 2021-12-02 DIAGNOSIS — Z23 NEED FOR VACCINATION: Primary | ICD-10-CM

## 2021-12-02 PROCEDURE — 91300 COVID-19, MRNA, LNP-S, PF, 30 MCG/0.3 ML DOSE VACCINE: CPT | Mod: PBBFAC | Performed by: INTERNAL MEDICINE

## 2021-12-02 PROCEDURE — 0003A COVID-19, MRNA, LNP-S, PF, 30 MCG/0.3 ML DOSE VACCINE: CPT | Mod: CV19,PBBFAC | Performed by: INTERNAL MEDICINE

## 2021-12-16 DIAGNOSIS — Z12.11 COLON CANCER SCREENING: Primary | ICD-10-CM

## 2022-03-29 ENCOUNTER — PATIENT OUTREACH (OUTPATIENT)
Dept: ADMINISTRATIVE | Facility: HOSPITAL | Age: 72
End: 2022-03-29
Payer: MEDICARE

## 2022-03-29 ENCOUNTER — PATIENT MESSAGE (OUTPATIENT)
Dept: ADMINISTRATIVE | Facility: HOSPITAL | Age: 72
End: 2022-03-29
Payer: MEDICARE

## 2022-05-04 ENCOUNTER — OFFICE VISIT (OUTPATIENT)
Dept: INTERNAL MEDICINE | Facility: CLINIC | Age: 72
End: 2022-05-04
Attending: INTERNAL MEDICINE
Payer: MEDICARE

## 2022-05-04 ENCOUNTER — LAB VISIT (OUTPATIENT)
Dept: LAB | Facility: OTHER | Age: 72
End: 2022-05-04
Attending: INTERNAL MEDICINE
Payer: MEDICARE

## 2022-05-04 VITALS
HEIGHT: 71 IN | OXYGEN SATURATION: 95 % | WEIGHT: 192 LBS | SYSTOLIC BLOOD PRESSURE: 142 MMHG | DIASTOLIC BLOOD PRESSURE: 96 MMHG | HEART RATE: 73 BPM | BODY MASS INDEX: 26.88 KG/M2

## 2022-05-04 DIAGNOSIS — D50.8 OTHER IRON DEFICIENCY ANEMIA: ICD-10-CM

## 2022-05-04 DIAGNOSIS — Z13.31 SCREENING FOR DEPRESSION: ICD-10-CM

## 2022-05-04 DIAGNOSIS — Z13.39 SCREENING FOR ALCOHOLISM: ICD-10-CM

## 2022-05-04 DIAGNOSIS — R79.89 OTHER SPECIFIED ABNORMAL FINDINGS OF BLOOD CHEMISTRY: ICD-10-CM

## 2022-05-04 DIAGNOSIS — M19.90 ARTHRITIS: ICD-10-CM

## 2022-05-04 DIAGNOSIS — D51.0 PERNICIOUS ANEMIA: ICD-10-CM

## 2022-05-04 DIAGNOSIS — Z12.5 SCREENING FOR PROSTATE CANCER: ICD-10-CM

## 2022-05-04 DIAGNOSIS — I10 ESSENTIAL HYPERTENSION: Primary | ICD-10-CM

## 2022-05-04 DIAGNOSIS — E78.9 DISORDER OF LIPID METABOLISM: ICD-10-CM

## 2022-05-04 DIAGNOSIS — E03.9 HYPOTHYROIDISM, UNSPECIFIED TYPE: ICD-10-CM

## 2022-05-04 DIAGNOSIS — Z12.11 COLON CANCER SCREENING: ICD-10-CM

## 2022-05-04 DIAGNOSIS — E78.5 DYSLIPIDEMIA: ICD-10-CM

## 2022-05-04 DIAGNOSIS — E55.9 VITAMIN D DEFICIENCY: ICD-10-CM

## 2022-05-04 DIAGNOSIS — R52 PAIN: ICD-10-CM

## 2022-05-04 LAB
25(OH)D3+25(OH)D2 SERPL-MCNC: 17 NG/ML (ref 30–96)
ALBUMIN SERPL BCP-MCNC: 4.3 G/DL (ref 3.5–5.2)
ALP SERPL-CCNC: 68 U/L (ref 55–135)
ALT SERPL W/O P-5'-P-CCNC: 22 U/L (ref 10–44)
ANION GAP SERPL CALC-SCNC: 10 MMOL/L (ref 8–16)
AST SERPL-CCNC: 18 U/L (ref 10–40)
BASOPHILS # BLD AUTO: 0.06 K/UL (ref 0–0.2)
BASOPHILS NFR BLD: 0.8 % (ref 0–1.9)
BILIRUB SERPL-MCNC: 0.8 MG/DL (ref 0.1–1)
BUN SERPL-MCNC: 18 MG/DL (ref 8–23)
CALCIUM SERPL-MCNC: 9.7 MG/DL (ref 8.7–10.5)
CHLORIDE SERPL-SCNC: 106 MMOL/L (ref 95–110)
CHOLEST SERPL-MCNC: 213 MG/DL (ref 120–199)
CHOLEST/HDLC SERPL: 4.1 {RATIO} (ref 2–5)
CO2 SERPL-SCNC: 24 MMOL/L (ref 23–29)
COMPLEXED PSA SERPL-MCNC: 1.9 NG/ML (ref 0–4)
CREAT SERPL-MCNC: 1.1 MG/DL (ref 0.5–1.4)
DIFFERENTIAL METHOD: NORMAL
EOSINOPHIL # BLD AUTO: 0.1 K/UL (ref 0–0.5)
EOSINOPHIL NFR BLD: 0.8 % (ref 0–8)
ERYTHROCYTE [DISTWIDTH] IN BLOOD BY AUTOMATED COUNT: 13.3 % (ref 11.5–14.5)
EST. GFR  (AFRICAN AMERICAN): >60 ML/MIN/1.73 M^2
EST. GFR  (NON AFRICAN AMERICAN): >60 ML/MIN/1.73 M^2
ESTIMATED AVG GLUCOSE: 111 MG/DL (ref 68–131)
GLUCOSE SERPL-MCNC: 94 MG/DL (ref 70–110)
HBA1C MFR BLD: 5.5 % (ref 4–5.6)
HCT VFR BLD AUTO: 45.6 % (ref 40–54)
HDLC SERPL-MCNC: 52 MG/DL (ref 40–75)
HDLC SERPL: 24.4 % (ref 20–50)
HGB BLD-MCNC: 15 G/DL (ref 14–18)
IMM GRANULOCYTES # BLD AUTO: 0.02 K/UL (ref 0–0.04)
IMM GRANULOCYTES NFR BLD AUTO: 0.3 % (ref 0–0.5)
LDLC SERPL CALC-MCNC: 141.4 MG/DL (ref 63–159)
LYMPHOCYTES # BLD AUTO: 1.5 K/UL (ref 1–4.8)
LYMPHOCYTES NFR BLD: 18.4 % (ref 18–48)
MCH RBC QN AUTO: 29.6 PG (ref 27–31)
MCHC RBC AUTO-ENTMCNC: 32.9 G/DL (ref 32–36)
MCV RBC AUTO: 90 FL (ref 82–98)
MONOCYTES # BLD AUTO: 0.6 K/UL (ref 0.3–1)
MONOCYTES NFR BLD: 7.9 % (ref 4–15)
NEUTROPHILS # BLD AUTO: 5.7 K/UL (ref 1.8–7.7)
NEUTROPHILS NFR BLD: 71.8 % (ref 38–73)
NONHDLC SERPL-MCNC: 161 MG/DL
NRBC BLD-RTO: 0 /100 WBC
PLATELET # BLD AUTO: 188 K/UL (ref 150–450)
PMV BLD AUTO: 12.4 FL (ref 9.2–12.9)
POTASSIUM SERPL-SCNC: 4.5 MMOL/L (ref 3.5–5.1)
PROT SERPL-MCNC: 7.8 G/DL (ref 6–8.4)
RBC # BLD AUTO: 5.06 M/UL (ref 4.6–6.2)
SODIUM SERPL-SCNC: 140 MMOL/L (ref 136–145)
TRIGL SERPL-MCNC: 98 MG/DL (ref 30–150)
TSH SERPL DL<=0.005 MIU/L-ACNC: 0.54 UIU/ML (ref 0.4–4)
VIT B12 SERPL-MCNC: 441 PG/ML (ref 210–950)
WBC # BLD AUTO: 7.88 K/UL (ref 3.9–12.7)

## 2022-05-04 PROCEDURE — 36415 COLL VENOUS BLD VENIPUNCTURE: CPT | Performed by: INTERNAL MEDICINE

## 2022-05-04 PROCEDURE — 3008F PR BODY MASS INDEX (BMI) DOCUMENTED: ICD-10-PCS | Mod: CPTII,S$GLB,, | Performed by: INTERNAL MEDICINE

## 2022-05-04 PROCEDURE — 82306 VITAMIN D 25 HYDROXY: CPT | Performed by: INTERNAL MEDICINE

## 2022-05-04 PROCEDURE — 80061 LIPID PANEL: CPT | Performed by: INTERNAL MEDICINE

## 2022-05-04 PROCEDURE — 80053 COMPREHEN METABOLIC PANEL: CPT | Performed by: INTERNAL MEDICINE

## 2022-05-04 PROCEDURE — 1160F PR REVIEW ALL MEDS BY PRESCRIBER/CLIN PHARMACIST DOCUMENTED: ICD-10-PCS | Mod: CPTII,S$GLB,, | Performed by: INTERNAL MEDICINE

## 2022-05-04 PROCEDURE — 84443 ASSAY THYROID STIM HORMONE: CPT | Performed by: INTERNAL MEDICINE

## 2022-05-04 PROCEDURE — 1160F RVW MEDS BY RX/DR IN RCRD: CPT | Mod: CPTII,S$GLB,, | Performed by: INTERNAL MEDICINE

## 2022-05-04 PROCEDURE — 84153 ASSAY OF PSA TOTAL: CPT | Performed by: INTERNAL MEDICINE

## 2022-05-04 PROCEDURE — G0442 PR  ALCOHOL SCREENING: ICD-10-PCS | Mod: 59,S$GLB,, | Performed by: INTERNAL MEDICINE

## 2022-05-04 PROCEDURE — 85025 COMPLETE CBC W/AUTO DIFF WBC: CPT | Performed by: INTERNAL MEDICINE

## 2022-05-04 PROCEDURE — G0444 DEPRESSION SCREEN ANNUAL: HCPCS | Mod: 59,S$GLB,, | Performed by: INTERNAL MEDICINE

## 2022-05-04 PROCEDURE — G0444 PR DEPRESSION SCREENING: ICD-10-PCS | Mod: 59,S$GLB,, | Performed by: INTERNAL MEDICINE

## 2022-05-04 PROCEDURE — 3008F BODY MASS INDEX DOCD: CPT | Mod: CPTII,S$GLB,, | Performed by: INTERNAL MEDICINE

## 2022-05-04 PROCEDURE — 99214 OFFICE O/P EST MOD 30 MIN: CPT | Mod: S$GLB,,, | Performed by: INTERNAL MEDICINE

## 2022-05-04 PROCEDURE — 99214 PR OFFICE/OUTPT VISIT, EST, LEVL IV, 30-39 MIN: ICD-10-PCS | Mod: S$GLB,,, | Performed by: INTERNAL MEDICINE

## 2022-05-04 PROCEDURE — 82607 VITAMIN B-12: CPT | Performed by: INTERNAL MEDICINE

## 2022-05-04 PROCEDURE — 3080F DIAST BP >= 90 MM HG: CPT | Mod: CPTII,S$GLB,, | Performed by: INTERNAL MEDICINE

## 2022-05-04 PROCEDURE — 3080F PR MOST RECENT DIASTOLIC BLOOD PRESSURE >= 90 MM HG: ICD-10-PCS | Mod: CPTII,S$GLB,, | Performed by: INTERNAL MEDICINE

## 2022-05-04 PROCEDURE — 1159F PR MEDICATION LIST DOCUMENTED IN MEDICAL RECORD: ICD-10-PCS | Mod: CPTII,S$GLB,, | Performed by: INTERNAL MEDICINE

## 2022-05-04 PROCEDURE — 1159F MED LIST DOCD IN RCRD: CPT | Mod: CPTII,S$GLB,, | Performed by: INTERNAL MEDICINE

## 2022-05-04 PROCEDURE — 3077F PR MOST RECENT SYSTOLIC BLOOD PRESSURE >= 140 MM HG: ICD-10-PCS | Mod: CPTII,S$GLB,, | Performed by: INTERNAL MEDICINE

## 2022-05-04 PROCEDURE — G0442 ANNUAL ALCOHOL SCREEN 15 MIN: HCPCS | Mod: 59,S$GLB,, | Performed by: INTERNAL MEDICINE

## 2022-05-04 PROCEDURE — 3077F SYST BP >= 140 MM HG: CPT | Mod: CPTII,S$GLB,, | Performed by: INTERNAL MEDICINE

## 2022-05-04 PROCEDURE — 83036 HEMOGLOBIN GLYCOSYLATED A1C: CPT | Performed by: INTERNAL MEDICINE

## 2022-05-04 RX ORDER — AMLODIPINE BESYLATE 5 MG/1
5 TABLET ORAL DAILY
Qty: 30 TABLET | Refills: 11 | Status: SHIPPED | OUTPATIENT
Start: 2022-05-04 | End: 2023-05-24

## 2022-05-04 RX ORDER — MELOXICAM 15 MG/1
15 TABLET ORAL DAILY PRN
Qty: 90 TABLET | Refills: 0 | Status: SHIPPED | OUTPATIENT
Start: 2022-05-04 | End: 2022-08-17

## 2022-05-04 RX ORDER — DICLOFENAC SODIUM 10 MG/G
2 GEL TOPICAL 4 TIMES DAILY
Qty: 1 EACH | Refills: 2 | Status: SHIPPED | OUTPATIENT
Start: 2022-05-04 | End: 2022-07-12

## 2022-05-04 NOTE — PROGRESS NOTES
Subjective:       Patient ID: Jerman Miguel is a 71 y.o. male.    Chief Complaint: Annual Exam and Hypertension    He has gained 11 lb in the past 6 months.  He ran out of amlodipine months ago.  He has not taken atorvastatin for over 6 months.  When he 1st gets out of bed in the morning he is slightly dizzy.    Hypertension  This is a chronic problem. The current episode started more than 1 year ago. The problem is uncontrolled.     Review of Systems   Constitutional: Negative.    Respiratory: Negative.    Cardiovascular: Negative.    Musculoskeletal: Positive for arthralgias.   Neurological: Positive for dizziness.         Objective:      Physical Exam  Vitals and nursing note reviewed.   Constitutional:       Appearance: He is well-developed.   HENT:      Head: Normocephalic and atraumatic.   Eyes:      Pupils: Pupils are equal, round, and reactive to light.   Cardiovascular:      Rate and Rhythm: Normal rate and regular rhythm.      Heart sounds: Normal heart sounds.   Pulmonary:      Effort: Pulmonary effort is normal.   Neurological:      Mental Status: He is alert.         Assessment:       Problem List Items Addressed This Visit        Unprioritized    Essential hypertension - Primary    Dyslipidemia      Other Visit Diagnoses     Pain        Relevant Medications    meloxicam (MOBIC) 15 MG tablet    Colon cancer screening        Relevant Orders    Cologuard Screening (Multitarget Stool DNA)    Arthritis        Screening for depression        Screening for alcoholism              Plan:       Per orders and D/C instructions.  Continue diet and/or meds for Arthritis and high cholesterol, which are stable.      restart amlodipine for hypertension  Cologuard.  Check routine labs.  Restart atorvastatin if cholesterol is high.    Screening: The patient was screened for depression with the PHQ2 questionnaire and possible health consequences were discussed with the patient, who understands (15 minutes spent).        The patient was screened for the misuse of alcohol, by asking the number of drinks per average week, and if pt has had more than 4 drinks (more than 3 for women and elderly) in 1 day within the past year. The health and legal consequences of misuse were discussed (15 minutes spent).

## 2022-05-19 ENCOUNTER — PATIENT OUTREACH (OUTPATIENT)
Dept: ADMINISTRATIVE | Facility: HOSPITAL | Age: 72
End: 2022-05-19
Payer: MEDICARE

## 2022-05-24 ENCOUNTER — PATIENT OUTREACH (OUTPATIENT)
Dept: ADMINISTRATIVE | Facility: HOSPITAL | Age: 72
End: 2022-05-24
Payer: MEDICARE

## 2022-07-01 ENCOUNTER — PATIENT OUTREACH (OUTPATIENT)
Dept: ADMINISTRATIVE | Facility: HOSPITAL | Age: 72
End: 2022-07-01
Payer: MEDICARE

## 2022-08-10 ENCOUNTER — PATIENT MESSAGE (OUTPATIENT)
Dept: ADMINISTRATIVE | Facility: HOSPITAL | Age: 72
End: 2022-08-10
Payer: MEDICARE

## 2022-08-10 ENCOUNTER — PATIENT OUTREACH (OUTPATIENT)
Dept: ADMINISTRATIVE | Facility: HOSPITAL | Age: 72
End: 2022-08-10
Payer: MEDICARE

## 2022-09-13 ENCOUNTER — PATIENT OUTREACH (OUTPATIENT)
Dept: ADMINISTRATIVE | Facility: HOSPITAL | Age: 72
End: 2022-09-13
Payer: MEDICARE

## 2022-09-26 ENCOUNTER — PATIENT OUTREACH (OUTPATIENT)
Dept: ADMINISTRATIVE | Facility: HOSPITAL | Age: 72
End: 2022-09-26
Payer: MEDICARE

## 2022-10-13 ENCOUNTER — PATIENT OUTREACH (OUTPATIENT)
Dept: ADMINISTRATIVE | Facility: HOSPITAL | Age: 72
End: 2022-10-13
Payer: MEDICARE

## 2022-10-24 ENCOUNTER — OFFICE VISIT (OUTPATIENT)
Dept: INTERNAL MEDICINE | Facility: CLINIC | Age: 72
End: 2022-10-24
Attending: INTERNAL MEDICINE
Payer: MEDICARE

## 2022-10-24 VITALS
DIASTOLIC BLOOD PRESSURE: 84 MMHG | OXYGEN SATURATION: 96 % | SYSTOLIC BLOOD PRESSURE: 132 MMHG | BODY MASS INDEX: 27.72 KG/M2 | HEART RATE: 59 BPM | WEIGHT: 198 LBS | HEIGHT: 71 IN

## 2022-10-24 DIAGNOSIS — E78.5 DYSLIPIDEMIA: ICD-10-CM

## 2022-10-24 DIAGNOSIS — Z12.11 COLON CANCER SCREENING: ICD-10-CM

## 2022-10-24 DIAGNOSIS — R52 PAIN: ICD-10-CM

## 2022-10-24 DIAGNOSIS — G89.29 CHRONIC PAIN OF LEFT KNEE: ICD-10-CM

## 2022-10-24 DIAGNOSIS — I10 ESSENTIAL HYPERTENSION: Primary | ICD-10-CM

## 2022-10-24 DIAGNOSIS — M25.562 CHRONIC PAIN OF LEFT KNEE: ICD-10-CM

## 2022-10-24 PROCEDURE — 90694 VACC AIIV4 NO PRSRV 0.5ML IM: CPT | Mod: S$GLB,,, | Performed by: INTERNAL MEDICINE

## 2022-10-24 PROCEDURE — 3079F DIAST BP 80-89 MM HG: CPT | Mod: CPTII,S$GLB,, | Performed by: INTERNAL MEDICINE

## 2022-10-24 PROCEDURE — G0008 FLU VACCINE - QUADRIVALENT - ADJUVANTED: ICD-10-PCS | Mod: S$GLB,,, | Performed by: INTERNAL MEDICINE

## 2022-10-24 PROCEDURE — 99214 PR OFFICE/OUTPT VISIT, EST, LEVL IV, 30-39 MIN: ICD-10-PCS | Mod: S$GLB,,, | Performed by: INTERNAL MEDICINE

## 2022-10-24 PROCEDURE — 3075F PR MOST RECENT SYSTOLIC BLOOD PRESS GE 130-139MM HG: ICD-10-PCS | Mod: CPTII,S$GLB,, | Performed by: INTERNAL MEDICINE

## 2022-10-24 PROCEDURE — 1159F PR MEDICATION LIST DOCUMENTED IN MEDICAL RECORD: ICD-10-PCS | Mod: CPTII,S$GLB,, | Performed by: INTERNAL MEDICINE

## 2022-10-24 PROCEDURE — G0008 ADMIN INFLUENZA VIRUS VAC: HCPCS | Mod: S$GLB,,, | Performed by: INTERNAL MEDICINE

## 2022-10-24 PROCEDURE — 3079F PR MOST RECENT DIASTOLIC BLOOD PRESSURE 80-89 MM HG: ICD-10-PCS | Mod: CPTII,S$GLB,, | Performed by: INTERNAL MEDICINE

## 2022-10-24 PROCEDURE — 99214 OFFICE O/P EST MOD 30 MIN: CPT | Mod: S$GLB,,, | Performed by: INTERNAL MEDICINE

## 2022-10-24 PROCEDURE — 4010F ACE/ARB THERAPY RXD/TAKEN: CPT | Mod: CPTII,S$GLB,, | Performed by: INTERNAL MEDICINE

## 2022-10-24 PROCEDURE — 1159F MED LIST DOCD IN RCRD: CPT | Mod: CPTII,S$GLB,, | Performed by: INTERNAL MEDICINE

## 2022-10-24 PROCEDURE — 3075F SYST BP GE 130 - 139MM HG: CPT | Mod: CPTII,S$GLB,, | Performed by: INTERNAL MEDICINE

## 2022-10-24 PROCEDURE — 4010F PR ACE/ARB THEARPY RXD/TAKEN: ICD-10-PCS | Mod: CPTII,S$GLB,, | Performed by: INTERNAL MEDICINE

## 2022-10-24 PROCEDURE — 3044F PR MOST RECENT HEMOGLOBIN A1C LEVEL <7.0%: ICD-10-PCS | Mod: CPTII,S$GLB,, | Performed by: INTERNAL MEDICINE

## 2022-10-24 PROCEDURE — 90694 FLU VACCINE - QUADRIVALENT - ADJUVANTED: ICD-10-PCS | Mod: S$GLB,,, | Performed by: INTERNAL MEDICINE

## 2022-10-24 PROCEDURE — 3044F HG A1C LEVEL LT 7.0%: CPT | Mod: CPTII,S$GLB,, | Performed by: INTERNAL MEDICINE

## 2022-10-24 PROCEDURE — 1160F PR REVIEW ALL MEDS BY PRESCRIBER/CLIN PHARMACIST DOCUMENTED: ICD-10-PCS | Mod: CPTII,S$GLB,, | Performed by: INTERNAL MEDICINE

## 2022-10-24 PROCEDURE — 1160F RVW MEDS BY RX/DR IN RCRD: CPT | Mod: CPTII,S$GLB,, | Performed by: INTERNAL MEDICINE

## 2022-10-24 RX ORDER — MELOXICAM 15 MG/1
15 TABLET ORAL DAILY PRN
Qty: 90 TABLET | Refills: 0 | Status: SHIPPED | OUTPATIENT
Start: 2022-10-24 | End: 2023-02-06

## 2022-10-24 NOTE — PROGRESS NOTES
Subjective:       Patient ID: Jerman Miguel is a 72 y.o. male.    Chief Complaint: Follow-up, Hypertension, Knee Pain (L knee pain would like to get Ortho referral ), and Flu Vaccine    He has chronic left knee pain and would like to see an orthopedist.    Follow-up  Associated symptoms include arthralgias.   Hypertension  This is a chronic problem. The current episode started more than 1 year ago. The problem is controlled.   Knee Pain   The incident occurred more than 1 week ago.   Review of Systems   Constitutional: Negative.    Respiratory: Negative.     Cardiovascular: Negative.    Musculoskeletal:  Positive for arthralgias.       Objective:      Physical Exam  Vitals and nursing note reviewed.   Constitutional:       Appearance: He is well-developed.   HENT:      Head: Normocephalic and atraumatic.   Eyes:      Pupils: Pupils are equal, round, and reactive to light.   Cardiovascular:      Rate and Rhythm: Normal rate and regular rhythm. Occasional Extrasystoles are present.     Heart sounds: Normal heart sounds.   Pulmonary:      Effort: Pulmonary effort is normal.   Neurological:      Mental Status: He is alert.       Assessment:       Problem List Items Addressed This Visit          Unprioritized    Essential hypertension - Primary    Dyslipidemia    Chronic pain of left knee    Relevant Orders    Ambulatory referral/consult to Orthopedics     Other Visit Diagnoses       Colon cancer screening        Relevant Orders    Cologuard Screening (Multitarget Stool DNA)    Pain        Relevant Medications    meloxicam (MOBIC) 15 MG tablet              Plan:       Per orders and D/C instructions.  Continue diet and/or meds for HTN, and high cholesterol, which are stable.    Refer to ortho for chronic left knee pain.    Between 30 and 39 min of total time for evaluation and management services were spent on the patient today.  The medical problems and treatment options were discussed, and all questions were  answered.

## 2022-10-31 ENCOUNTER — TELEPHONE (OUTPATIENT)
Dept: ORTHOPEDICS | Facility: CLINIC | Age: 72
End: 2022-10-31
Payer: MEDICARE

## 2022-11-04 ENCOUNTER — PATIENT OUTREACH (OUTPATIENT)
Dept: ADMINISTRATIVE | Facility: HOSPITAL | Age: 72
End: 2022-11-04
Payer: MEDICARE

## 2022-12-21 ENCOUNTER — PES CALL (OUTPATIENT)
Dept: ADMINISTRATIVE | Facility: OTHER | Age: 72
End: 2022-12-21
Payer: MEDICARE

## 2023-01-23 ENCOUNTER — TELEPHONE (OUTPATIENT)
Dept: ADMINISTRATIVE | Facility: CLINIC | Age: 73
End: 2023-01-23
Payer: MEDICARE

## 2023-01-23 NOTE — TELEPHONE ENCOUNTER
Called pt; no answer; left message informing pt I was calling to inform him we need to cancel his 2/22/23 awv appt due to his primary care provider doing his own awv appts; left my name and number for pt to return my call to get appt canceled

## 2023-01-30 LAB — NONINV COLON CA DNA+OCC BLD SCRN STL QL: NEGATIVE

## 2023-05-19 DIAGNOSIS — R52 PAIN: ICD-10-CM

## 2023-05-19 RX ORDER — MELOXICAM 15 MG/1
TABLET ORAL
Qty: 90 TABLET | Refills: 0 | Status: SHIPPED | OUTPATIENT
Start: 2023-05-19 | End: 2023-05-30 | Stop reason: SDUPTHER

## 2023-05-24 RX ORDER — AMLODIPINE BESYLATE 5 MG/1
TABLET ORAL
Qty: 30 TABLET | Refills: 0 | Status: SHIPPED | OUTPATIENT
Start: 2023-05-24 | End: 2023-05-24

## 2023-05-24 RX ORDER — AMLODIPINE BESYLATE 5 MG/1
TABLET ORAL
Qty: 90 TABLET | Refills: 0 | Status: SHIPPED | OUTPATIENT
Start: 2023-05-24 | End: 2023-08-22

## 2023-05-30 ENCOUNTER — OFFICE VISIT (OUTPATIENT)
Dept: INTERNAL MEDICINE | Facility: CLINIC | Age: 73
End: 2023-05-30
Attending: INTERNAL MEDICINE
Payer: MEDICARE

## 2023-05-30 ENCOUNTER — LAB VISIT (OUTPATIENT)
Dept: LAB | Facility: OTHER | Age: 73
End: 2023-05-30
Attending: INTERNAL MEDICINE
Payer: MEDICARE

## 2023-05-30 VITALS
DIASTOLIC BLOOD PRESSURE: 76 MMHG | BODY MASS INDEX: 26.88 KG/M2 | HEIGHT: 71 IN | SYSTOLIC BLOOD PRESSURE: 110 MMHG | HEART RATE: 75 BPM | OXYGEN SATURATION: 96 % | WEIGHT: 192 LBS

## 2023-05-30 DIAGNOSIS — E78.9 DISORDER OF LIPID METABOLISM: ICD-10-CM

## 2023-05-30 DIAGNOSIS — Z12.5 SCREENING FOR PROSTATE CANCER: ICD-10-CM

## 2023-05-30 DIAGNOSIS — M25.562 CHRONIC PAIN OF LEFT KNEE: ICD-10-CM

## 2023-05-30 DIAGNOSIS — I10 ESSENTIAL HYPERTENSION: Primary | ICD-10-CM

## 2023-05-30 DIAGNOSIS — D50.8 OTHER IRON DEFICIENCY ANEMIA: ICD-10-CM

## 2023-05-30 DIAGNOSIS — E03.9 HYPOTHYROIDISM, UNSPECIFIED TYPE: ICD-10-CM

## 2023-05-30 DIAGNOSIS — D51.0 PERNICIOUS ANEMIA: ICD-10-CM

## 2023-05-30 DIAGNOSIS — E55.9 VITAMIN D DEFICIENCY: ICD-10-CM

## 2023-05-30 DIAGNOSIS — Z00.00 ROUTINE ADULT HEALTH MAINTENANCE: ICD-10-CM

## 2023-05-30 DIAGNOSIS — G89.29 CHRONIC PAIN OF LEFT KNEE: ICD-10-CM

## 2023-05-30 DIAGNOSIS — R52 PAIN: ICD-10-CM

## 2023-05-30 DIAGNOSIS — Z13.39 SCREENING FOR ALCOHOLISM: ICD-10-CM

## 2023-05-30 DIAGNOSIS — Z13.31 SCREENING FOR DEPRESSION: ICD-10-CM

## 2023-05-30 DIAGNOSIS — E78.9 DISORDER OF LIPID METABOLISM: Primary | ICD-10-CM

## 2023-05-30 DIAGNOSIS — R79.89 OTHER SPECIFIED ABNORMAL FINDINGS OF BLOOD CHEMISTRY: ICD-10-CM

## 2023-05-30 DIAGNOSIS — E78.5 DYSLIPIDEMIA: ICD-10-CM

## 2023-05-30 LAB
25(OH)D3+25(OH)D2 SERPL-MCNC: 15 NG/ML (ref 30–96)
ALBUMIN SERPL BCP-MCNC: 4.2 G/DL (ref 3.5–5.2)
ALP SERPL-CCNC: 74 U/L (ref 55–135)
ALT SERPL W/O P-5'-P-CCNC: 12 U/L (ref 10–44)
ANION GAP SERPL CALC-SCNC: 7 MMOL/L (ref 8–16)
AST SERPL-CCNC: 14 U/L (ref 10–40)
BASOPHILS # BLD AUTO: 0.05 K/UL (ref 0–0.2)
BASOPHILS NFR BLD: 0.7 % (ref 0–1.9)
BILIRUB SERPL-MCNC: 0.6 MG/DL (ref 0.1–1)
BUN SERPL-MCNC: 20 MG/DL (ref 8–23)
CALCIUM SERPL-MCNC: 9.3 MG/DL (ref 8.7–10.5)
CHLORIDE SERPL-SCNC: 108 MMOL/L (ref 95–110)
CHOLEST SERPL-MCNC: 184 MG/DL (ref 120–199)
CHOLEST/HDLC SERPL: 4.7 {RATIO} (ref 2–5)
CO2 SERPL-SCNC: 25 MMOL/L (ref 23–29)
COMPLEXED PSA SERPL-MCNC: 1.2 NG/ML (ref 0–4)
CREAT SERPL-MCNC: 1.2 MG/DL (ref 0.5–1.4)
DIFFERENTIAL METHOD: ABNORMAL
EOSINOPHIL # BLD AUTO: 0.1 K/UL (ref 0–0.5)
EOSINOPHIL NFR BLD: 0.7 % (ref 0–8)
ERYTHROCYTE [DISTWIDTH] IN BLOOD BY AUTOMATED COUNT: 13.7 % (ref 11.5–14.5)
EST. GFR  (NO RACE VARIABLE): >60 ML/MIN/1.73 M^2
ESTIMATED AVG GLUCOSE: 103 MG/DL (ref 68–131)
GLUCOSE SERPL-MCNC: 106 MG/DL (ref 70–110)
HBA1C MFR BLD: 5.2 % (ref 4–5.6)
HCT VFR BLD AUTO: 46.6 % (ref 40–54)
HDLC SERPL-MCNC: 39 MG/DL (ref 40–75)
HDLC SERPL: 21.2 % (ref 20–50)
HGB BLD-MCNC: 14.7 G/DL (ref 14–18)
IMM GRANULOCYTES # BLD AUTO: 0.01 K/UL (ref 0–0.04)
IMM GRANULOCYTES NFR BLD AUTO: 0.1 % (ref 0–0.5)
LDLC SERPL CALC-MCNC: 130.8 MG/DL (ref 63–159)
LYMPHOCYTES # BLD AUTO: 1.2 K/UL (ref 1–4.8)
LYMPHOCYTES NFR BLD: 16.6 % (ref 18–48)
MCH RBC QN AUTO: 29.5 PG (ref 27–31)
MCHC RBC AUTO-ENTMCNC: 31.5 G/DL (ref 32–36)
MCV RBC AUTO: 94 FL (ref 82–98)
MONOCYTES # BLD AUTO: 0.6 K/UL (ref 0.3–1)
MONOCYTES NFR BLD: 8.7 % (ref 4–15)
NEUTROPHILS # BLD AUTO: 5.1 K/UL (ref 1.8–7.7)
NEUTROPHILS NFR BLD: 73.2 % (ref 38–73)
NONHDLC SERPL-MCNC: 145 MG/DL
NRBC BLD-RTO: 0 /100 WBC
PLATELET # BLD AUTO: 159 K/UL (ref 150–450)
PMV BLD AUTO: 11.7 FL (ref 9.2–12.9)
POTASSIUM SERPL-SCNC: 4.1 MMOL/L (ref 3.5–5.1)
PROT SERPL-MCNC: 7.3 G/DL (ref 6–8.4)
RBC # BLD AUTO: 4.98 M/UL (ref 4.6–6.2)
SODIUM SERPL-SCNC: 140 MMOL/L (ref 136–145)
TRIGL SERPL-MCNC: 71 MG/DL (ref 30–150)
TSH SERPL DL<=0.005 MIU/L-ACNC: 0.8 UIU/ML (ref 0.4–4)
VIT B12 SERPL-MCNC: 468 PG/ML (ref 210–950)
WBC # BLD AUTO: 6.92 K/UL (ref 3.9–12.7)

## 2023-05-30 PROCEDURE — G0444 DEPRESSION SCREEN ANNUAL: HCPCS | Mod: 59,S$GLB,, | Performed by: INTERNAL MEDICINE

## 2023-05-30 PROCEDURE — 4010F PR ACE/ARB THEARPY RXD/TAKEN: ICD-10-PCS | Mod: CPTII,S$GLB,, | Performed by: INTERNAL MEDICINE

## 2023-05-30 PROCEDURE — 84443 ASSAY THYROID STIM HORMONE: CPT | Performed by: INTERNAL MEDICINE

## 2023-05-30 PROCEDURE — 85025 COMPLETE CBC W/AUTO DIFF WBC: CPT | Performed by: INTERNAL MEDICINE

## 2023-05-30 PROCEDURE — 82607 VITAMIN B-12: CPT | Performed by: INTERNAL MEDICINE

## 2023-05-30 PROCEDURE — 83036 HEMOGLOBIN GLYCOSYLATED A1C: CPT | Performed by: INTERNAL MEDICINE

## 2023-05-30 PROCEDURE — 1159F MED LIST DOCD IN RCRD: CPT | Mod: CPTII,S$GLB,, | Performed by: INTERNAL MEDICINE

## 2023-05-30 PROCEDURE — 36415 COLL VENOUS BLD VENIPUNCTURE: CPT | Performed by: INTERNAL MEDICINE

## 2023-05-30 PROCEDURE — 99214 OFFICE O/P EST MOD 30 MIN: CPT | Mod: 25,S$GLB,, | Performed by: INTERNAL MEDICINE

## 2023-05-30 PROCEDURE — 3008F BODY MASS INDEX DOCD: CPT | Mod: CPTII,S$GLB,, | Performed by: INTERNAL MEDICINE

## 2023-05-30 PROCEDURE — 3078F DIAST BP <80 MM HG: CPT | Mod: CPTII,S$GLB,, | Performed by: INTERNAL MEDICINE

## 2023-05-30 PROCEDURE — 99214 PR OFFICE/OUTPT VISIT, EST, LEVL IV, 30-39 MIN: ICD-10-PCS | Mod: 25,S$GLB,, | Performed by: INTERNAL MEDICINE

## 2023-05-30 PROCEDURE — 3074F PR MOST RECENT SYSTOLIC BLOOD PRESSURE < 130 MM HG: ICD-10-PCS | Mod: CPTII,S$GLB,, | Performed by: INTERNAL MEDICINE

## 2023-05-30 PROCEDURE — 1160F PR REVIEW ALL MEDS BY PRESCRIBER/CLIN PHARMACIST DOCUMENTED: ICD-10-PCS | Mod: CPTII,S$GLB,, | Performed by: INTERNAL MEDICINE

## 2023-05-30 PROCEDURE — 3078F PR MOST RECENT DIASTOLIC BLOOD PRESSURE < 80 MM HG: ICD-10-PCS | Mod: CPTII,S$GLB,, | Performed by: INTERNAL MEDICINE

## 2023-05-30 PROCEDURE — G0444 PR DEPRESSION SCREENING: ICD-10-PCS | Mod: 59,S$GLB,, | Performed by: INTERNAL MEDICINE

## 2023-05-30 PROCEDURE — 84153 ASSAY OF PSA TOTAL: CPT | Performed by: INTERNAL MEDICINE

## 2023-05-30 PROCEDURE — G0442 ANNUAL ALCOHOL SCREEN 15 MIN: HCPCS | Mod: 59,S$GLB,, | Performed by: INTERNAL MEDICINE

## 2023-05-30 PROCEDURE — G0442 PR  ALCOHOL SCREENING: ICD-10-PCS | Mod: 59,S$GLB,, | Performed by: INTERNAL MEDICINE

## 2023-05-30 PROCEDURE — 3008F PR BODY MASS INDEX (BMI) DOCUMENTED: ICD-10-PCS | Mod: CPTII,S$GLB,, | Performed by: INTERNAL MEDICINE

## 2023-05-30 PROCEDURE — 80053 COMPREHEN METABOLIC PANEL: CPT | Performed by: INTERNAL MEDICINE

## 2023-05-30 PROCEDURE — 3074F SYST BP LT 130 MM HG: CPT | Mod: CPTII,S$GLB,, | Performed by: INTERNAL MEDICINE

## 2023-05-30 PROCEDURE — 82306 VITAMIN D 25 HYDROXY: CPT | Performed by: INTERNAL MEDICINE

## 2023-05-30 PROCEDURE — 1159F PR MEDICATION LIST DOCUMENTED IN MEDICAL RECORD: ICD-10-PCS | Mod: CPTII,S$GLB,, | Performed by: INTERNAL MEDICINE

## 2023-05-30 PROCEDURE — 80061 LIPID PANEL: CPT | Performed by: INTERNAL MEDICINE

## 2023-05-30 PROCEDURE — 4010F ACE/ARB THERAPY RXD/TAKEN: CPT | Mod: CPTII,S$GLB,, | Performed by: INTERNAL MEDICINE

## 2023-05-30 PROCEDURE — 1160F RVW MEDS BY RX/DR IN RCRD: CPT | Mod: CPTII,S$GLB,, | Performed by: INTERNAL MEDICINE

## 2023-05-30 RX ORDER — ZOSTER VACCINE RECOMBINANT, ADJUVANTED 50 MCG/0.5
0.5 KIT INTRAMUSCULAR ONCE
Qty: 1 EACH | Refills: 0 | Status: SHIPPED | OUTPATIENT
Start: 2023-05-30 | End: 2023-05-30

## 2023-05-30 RX ORDER — MELOXICAM 15 MG/1
15 TABLET ORAL DAILY PRN
Qty: 90 TABLET | Refills: 0 | Status: SHIPPED | OUTPATIENT
Start: 2023-05-30 | End: 2023-12-01

## 2023-05-30 NOTE — PROGRESS NOTES
Subjective     Patient ID: Jerman Miguel is a 72 y.o. male.    Chief Complaint: Annual Exam (Shingles vaccine ), Hypertension, and Knee Pain (L knee hurting for about 8 months progressively getting worse )    He continues to have pain in his left knee, which is getting worse.    Hypertension  This is a chronic problem. The current episode started more than 1 year ago. The problem is controlled.   Knee Pain     Review of Systems   Constitutional: Negative.    Respiratory: Negative.     Cardiovascular: Negative.    Musculoskeletal:  Positive for arthralgias.        Objective     Physical Exam  Vitals and nursing note reviewed.   Constitutional:       Appearance: He is well-developed.   HENT:      Head: Normocephalic and atraumatic.   Eyes:      Pupils: Pupils are equal, round, and reactive to light.   Cardiovascular:      Rate and Rhythm: Normal rate and regular rhythm.      Heart sounds: Normal heart sounds.   Pulmonary:      Effort: Pulmonary effort is normal.   Musculoskeletal:      Left knee: Decreased range of motion.   Neurological:      Mental Status: He is alert.          Assessment and Plan     1. Essential hypertension  Overview:  2014      2. Dyslipidemia    3. Chronic pain of left knee  -     meloxicam (MOBIC) 15 MG tablet; Take 1 tablet (15 mg total) by mouth daily as needed for Pain. Take with food  Dispense: 90 tablet; Refill: 0    4. Pain    5. Routine adult health maintenance  -     varicella-zoster [id number], PF, (SHINGRIX, PF,) 50 mcg/0.5 mL injection; Inject 0.5 mLs into the muscle once. for 1 dose  Dispense: 1 each; Refill: 0    6. Screening for depression    7. Screening for alcoholism        Per orders and D/C instructions.  Continue diet and/or meds for HTN, and high cholesterol, which are stable.  Refer to ortho for chronic worsening left knee pain.  Continue meloxicam as needed for now.  Check routine labs.    Screening: The patient was screened for depression with the PHQ2  questionnaire and possible health consequences were discussed with the patient, who understands (15 minutes spent).       The patient was screened for the misuse of alcohol, by asking the number of drinks per average week, and if pt has had more than 4 drinks (more than 3 for women and elderly) in 1 day within the past year. The health and legal consequences of misuse were discussed (15 minutes spent).          Follow up in about 6 months (around 11/30/2023).

## 2023-08-22 RX ORDER — AMLODIPINE BESYLATE 5 MG/1
TABLET ORAL
Qty: 90 TABLET | Refills: 2 | Status: SHIPPED | OUTPATIENT
Start: 2023-08-22

## 2023-12-18 ENCOUNTER — OFFICE VISIT (OUTPATIENT)
Dept: INTERNAL MEDICINE | Facility: CLINIC | Age: 73
End: 2023-12-18
Attending: INTERNAL MEDICINE
Payer: MEDICARE

## 2023-12-18 VITALS
HEIGHT: 71 IN | HEART RATE: 80 BPM | BODY MASS INDEX: 25.34 KG/M2 | OXYGEN SATURATION: 99 % | SYSTOLIC BLOOD PRESSURE: 108 MMHG | WEIGHT: 181 LBS | DIASTOLIC BLOOD PRESSURE: 70 MMHG

## 2023-12-18 DIAGNOSIS — I10 ESSENTIAL HYPERTENSION: Primary | ICD-10-CM

## 2023-12-18 DIAGNOSIS — E78.5 DYSLIPIDEMIA: ICD-10-CM

## 2023-12-18 DIAGNOSIS — M25.562 CHRONIC PAIN OF LEFT KNEE: ICD-10-CM

## 2023-12-18 DIAGNOSIS — S60.429A BLISTER OF FINGER WITH INFECTION, INITIAL ENCOUNTER: ICD-10-CM

## 2023-12-18 DIAGNOSIS — G89.29 CHRONIC PAIN OF LEFT KNEE: ICD-10-CM

## 2023-12-18 DIAGNOSIS — L08.9 BLISTER OF FINGER WITH INFECTION, INITIAL ENCOUNTER: ICD-10-CM

## 2023-12-18 PROBLEM — I77.72 DISSECTION OF RIGHT ILIAC ARTERY: Status: RESOLVED | Noted: 2021-09-15 | Resolved: 2023-12-18

## 2023-12-18 PROCEDURE — 99214 PR OFFICE/OUTPT VISIT, EST, LEVL IV, 30-39 MIN: ICD-10-PCS | Mod: S$GLB,,, | Performed by: INTERNAL MEDICINE

## 2023-12-18 PROCEDURE — 4010F PR ACE/ARB THEARPY RXD/TAKEN: ICD-10-PCS | Mod: CPTII,S$GLB,, | Performed by: INTERNAL MEDICINE

## 2023-12-18 PROCEDURE — 3044F HG A1C LEVEL LT 7.0%: CPT | Mod: CPTII,S$GLB,, | Performed by: INTERNAL MEDICINE

## 2023-12-18 PROCEDURE — 1159F PR MEDICATION LIST DOCUMENTED IN MEDICAL RECORD: ICD-10-PCS | Mod: CPTII,S$GLB,, | Performed by: INTERNAL MEDICINE

## 2023-12-18 PROCEDURE — 3044F PR MOST RECENT HEMOGLOBIN A1C LEVEL <7.0%: ICD-10-PCS | Mod: CPTII,S$GLB,, | Performed by: INTERNAL MEDICINE

## 2023-12-18 PROCEDURE — 3008F PR BODY MASS INDEX (BMI) DOCUMENTED: ICD-10-PCS | Mod: CPTII,S$GLB,, | Performed by: INTERNAL MEDICINE

## 2023-12-18 PROCEDURE — 1160F RVW MEDS BY RX/DR IN RCRD: CPT | Mod: CPTII,S$GLB,, | Performed by: INTERNAL MEDICINE

## 2023-12-18 PROCEDURE — 1159F MED LIST DOCD IN RCRD: CPT | Mod: CPTII,S$GLB,, | Performed by: INTERNAL MEDICINE

## 2023-12-18 PROCEDURE — 3074F PR MOST RECENT SYSTOLIC BLOOD PRESSURE < 130 MM HG: ICD-10-PCS | Mod: CPTII,S$GLB,, | Performed by: INTERNAL MEDICINE

## 2023-12-18 PROCEDURE — 99214 OFFICE O/P EST MOD 30 MIN: CPT | Mod: S$GLB,,, | Performed by: INTERNAL MEDICINE

## 2023-12-18 PROCEDURE — 90694 FLU VACCINE - QUADRIVALENT - ADJUVANTED: ICD-10-PCS | Mod: S$GLB,,, | Performed by: INTERNAL MEDICINE

## 2023-12-18 PROCEDURE — 1160F PR REVIEW ALL MEDS BY PRESCRIBER/CLIN PHARMACIST DOCUMENTED: ICD-10-PCS | Mod: CPTII,S$GLB,, | Performed by: INTERNAL MEDICINE

## 2023-12-18 PROCEDURE — G0008 FLU VACCINE - QUADRIVALENT - ADJUVANTED: ICD-10-PCS | Mod: S$GLB,,, | Performed by: INTERNAL MEDICINE

## 2023-12-18 PROCEDURE — 3074F SYST BP LT 130 MM HG: CPT | Mod: CPTII,S$GLB,, | Performed by: INTERNAL MEDICINE

## 2023-12-18 PROCEDURE — 4010F ACE/ARB THERAPY RXD/TAKEN: CPT | Mod: CPTII,S$GLB,, | Performed by: INTERNAL MEDICINE

## 2023-12-18 PROCEDURE — G0008 ADMIN INFLUENZA VIRUS VAC: HCPCS | Mod: S$GLB,,, | Performed by: INTERNAL MEDICINE

## 2023-12-18 PROCEDURE — 3078F DIAST BP <80 MM HG: CPT | Mod: CPTII,S$GLB,, | Performed by: INTERNAL MEDICINE

## 2023-12-18 PROCEDURE — 90694 VACC AIIV4 NO PRSRV 0.5ML IM: CPT | Mod: S$GLB,,, | Performed by: INTERNAL MEDICINE

## 2023-12-18 PROCEDURE — 3008F BODY MASS INDEX DOCD: CPT | Mod: CPTII,S$GLB,, | Performed by: INTERNAL MEDICINE

## 2023-12-18 PROCEDURE — 3078F PR MOST RECENT DIASTOLIC BLOOD PRESSURE < 80 MM HG: ICD-10-PCS | Mod: CPTII,S$GLB,, | Performed by: INTERNAL MEDICINE

## 2023-12-18 RX ORDER — MINOCYCLINE HYDROCHLORIDE 100 MG/1
100 CAPSULE ORAL 2 TIMES DAILY
Qty: 10 CAPSULE | Refills: 0 | Status: SHIPPED | OUTPATIENT
Start: 2023-12-18 | End: 2024-03-18 | Stop reason: ALTCHOICE

## 2023-12-18 NOTE — PROGRESS NOTES
Subjective     Patient ID: Jerman Miguel is a 73 y.o. male.    Chief Complaint: Follow-up (Would like antibiotics for infection in finger on L hand ), Hypertension, and Flu Vaccine    Three days ago he pulled off a hang nail and now has some infection of the left 2nd finger on the side of the medial nail.      Hypertension  This is a chronic problem. The current episode started more than 1 year ago. The problem is controlled.           Follow-up    Hypertension      Review of Systems   Constitutional: Negative.    Respiratory: Negative.     Cardiovascular: Negative.           Objective     Physical Exam  Vitals and nursing note reviewed.   Constitutional:       Appearance: He is well-developed.   HENT:      Head: Normocephalic and atraumatic.   Eyes:      Pupils: Pupils are equal, round, and reactive to light.   Cardiovascular:      Rate and Rhythm: Normal rate and regular rhythm.      Heart sounds: Normal heart sounds.   Pulmonary:      Effort: Pulmonary effort is normal.   Musculoskeletal:      Left knee: Decreased range of motion.      Comments: Very small blister medial side of left 2nd finger near nail.   Neurological:      Mental Status: He is alert.            Assessment and Plan     1. Essential hypertension  Overview:  2014      2. Dyslipidemia    3. Chronic pain of left knee    4. Blister of finger with infection, initial encounter    Other orders  -     Influenza - Quadrivalent (Adjuvanted)  -     minocycline (MINOCIN,DYNACIN) 100 MG capsule; Take 1 capsule (100 mg total) by mouth 2 (two) times daily.  Dispense: 10 capsule; Refill: 0        PLAN:  Per orders and D/C instructions.  Continue diet and/or meds for left knee pain, HTN, and high cholesterol, which are stable.  Minocin for 5 days for infected finger.  Flu shot today.    Between 30 and 39 min of total time for evaluation and management services were spent on the patient today.  The medical problems and treatment options were discussed, and  all questions were answered.         Follow up in about 6 months (around 6/18/2024).

## 2023-12-25 ENCOUNTER — HOSPITAL ENCOUNTER (OUTPATIENT)
Facility: HOSPITAL | Age: 73
Discharge: HOME | End: 2023-12-27
Attending: EMERGENCY MEDICINE | Admitting: HOSPITALIST
Payer: MEDICARE

## 2023-12-25 DIAGNOSIS — K64.8 INTERNAL HEMORRHOIDS: ICD-10-CM

## 2023-12-25 DIAGNOSIS — D62 ACUTE BLOOD LOSS ANEMIA: ICD-10-CM

## 2023-12-25 DIAGNOSIS — Z51.81 ENCOUNTER FOR MONITORING LONG-TERM PROTON PUMP INHIBITOR THERAPY: ICD-10-CM

## 2023-12-25 DIAGNOSIS — D50.9 IRON DEFICIENCY ANEMIA, UNSPECIFIED IRON DEFICIENCY ANEMIA TYPE: ICD-10-CM

## 2023-12-25 DIAGNOSIS — K92.2 GI BLEEDING: Primary | ICD-10-CM

## 2023-12-25 DIAGNOSIS — K25.9 GASTRIC ULCER, UNSPECIFIED CHRONICITY, UNSPECIFIED WHETHER GASTRIC ULCER HEMORRHAGE OR PERFORATION PRESENT: ICD-10-CM

## 2023-12-25 DIAGNOSIS — K52.9 COLITIS: ICD-10-CM

## 2023-12-25 DIAGNOSIS — Z79.899 ENCOUNTER FOR MONITORING LONG-TERM PROTON PUMP INHIBITOR THERAPY: ICD-10-CM

## 2023-12-25 DIAGNOSIS — R07.9 CHEST PAIN: ICD-10-CM

## 2023-12-25 PROBLEM — N17.9 AKI (ACUTE KIDNEY INJURY): Status: ACTIVE | Noted: 2023-12-25

## 2023-12-25 LAB
ABO + RH BLD: NORMAL
ABO + RH BLD: NORMAL
ALBUMIN SERPL BCP-MCNC: 3.7 G/DL (ref 3.5–5.2)
ALP SERPL-CCNC: 61 U/L (ref 55–135)
ALT SERPL W/O P-5'-P-CCNC: 11 U/L (ref 10–44)
ANION GAP SERPL CALC-SCNC: 11 MMOL/L (ref 8–16)
APTT PPP: 27.8 SEC (ref 21–32)
AST SERPL-CCNC: 21 U/L (ref 10–40)
BASOPHILS # BLD AUTO: 0.05 K/UL (ref 0–0.2)
BASOPHILS # BLD AUTO: 0.06 K/UL (ref 0–0.2)
BASOPHILS NFR BLD: 0.7 % (ref 0–1.9)
BASOPHILS NFR BLD: 1 % (ref 0–1.9)
BILIRUB SERPL-MCNC: 0.4 MG/DL (ref 0.1–1)
BLD GP AB SCN CELLS X3 SERPL QL: NORMAL
BLD GP AB SCN CELLS X3 SERPL QL: NORMAL
BLD PROD TYP BPU: NORMAL
BLOOD UNIT EXPIRATION DATE: NORMAL
BLOOD UNIT TYPE CODE: 5100
BLOOD UNIT TYPE: NORMAL
BUN SERPL-MCNC: 32 MG/DL (ref 8–23)
CALCIUM SERPL-MCNC: 8.9 MG/DL (ref 8.7–10.5)
CHLORIDE SERPL-SCNC: 110 MMOL/L (ref 95–110)
CO2 SERPL-SCNC: 20 MMOL/L (ref 23–29)
CODING SYSTEM: NORMAL
CREAT SERPL-MCNC: 1.2 MG/DL (ref 0.5–1.4)
CREAT SERPL-MCNC: 1.3 MG/DL (ref 0.5–1.4)
CROSSMATCH INTERPRETATION: NORMAL
DIFFERENTIAL METHOD BLD: ABNORMAL
DIFFERENTIAL METHOD BLD: ABNORMAL
DISPENSE STATUS: NORMAL
EOSINOPHIL # BLD AUTO: 0.1 K/UL (ref 0–0.5)
EOSINOPHIL # BLD AUTO: 0.2 K/UL (ref 0–0.5)
EOSINOPHIL NFR BLD: 2 % (ref 0–8)
EOSINOPHIL NFR BLD: 2.4 % (ref 0–8)
ERYTHROCYTE [DISTWIDTH] IN BLOOD BY AUTOMATED COUNT: 14.4 % (ref 11.5–14.5)
EST. GFR  (NO RACE VARIABLE): >60 ML/MIN/1.73 M^2
FERRITIN SERPL-MCNC: 12 NG/ML (ref 20–300)
GLUCOSE SERPL-MCNC: 107 MG/DL (ref 70–110)
HCT VFR BLD AUTO: 22.8 % (ref 40–54)
HCT VFR BLD AUTO: 25.7 % (ref 40–54)
HCT VFR BLD AUTO: 27.7 % (ref 40–54)
HCT VFR BLD CALC: 26 %PCV (ref 36–54)
HGB BLD-MCNC: 7.1 G/DL (ref 14–18)
HGB BLD-MCNC: 8 G/DL (ref 14–18)
HGB BLD-MCNC: 8.5 G/DL (ref 14–18)
HGB BLD-MCNC: 9 G/DL
IMM GRANULOCYTES # BLD AUTO: 0.01 K/UL (ref 0–0.04)
IMM GRANULOCYTES # BLD AUTO: 0.01 K/UL (ref 0–0.04)
IMM GRANULOCYTES NFR BLD AUTO: 0.1 % (ref 0–0.5)
IMM GRANULOCYTES NFR BLD AUTO: 0.2 % (ref 0–0.5)
INR PPP: 1 (ref 0.8–1.2)
IRON SERPL-MCNC: 16 UG/DL (ref 45–160)
LYMPHOCYTES # BLD AUTO: 1.4 K/UL (ref 1–4.8)
LYMPHOCYTES # BLD AUTO: 1.5 K/UL (ref 1–4.8)
LYMPHOCYTES NFR BLD: 19.3 % (ref 18–48)
LYMPHOCYTES NFR BLD: 24.2 % (ref 18–48)
MCH RBC QN AUTO: 25.4 PG (ref 27–31)
MCH RBC QN AUTO: 25.6 PG (ref 27–31)
MCH RBC QN AUTO: 25.7 PG (ref 27–31)
MCHC RBC AUTO-ENTMCNC: 30.7 G/DL (ref 32–36)
MCHC RBC AUTO-ENTMCNC: 31.1 G/DL (ref 32–36)
MCHC RBC AUTO-ENTMCNC: 31.1 G/DL (ref 32–36)
MCV RBC AUTO: 81 FL (ref 82–98)
MCV RBC AUTO: 82 FL (ref 82–98)
MCV RBC AUTO: 84 FL (ref 82–98)
MONOCYTES # BLD AUTO: 0.5 K/UL (ref 0.3–1)
MONOCYTES # BLD AUTO: 0.6 K/UL (ref 0.3–1)
MONOCYTES NFR BLD: 7.5 % (ref 4–15)
MONOCYTES NFR BLD: 9.4 % (ref 4–15)
NEUTROPHILS # BLD AUTO: 3.9 K/UL (ref 1.8–7.7)
NEUTROPHILS # BLD AUTO: 5 K/UL (ref 1.8–7.7)
NEUTROPHILS NFR BLD: 62.8 % (ref 38–73)
NEUTROPHILS NFR BLD: 70.4 % (ref 38–73)
NRBC BLD-RTO: 0 /100 WBC
NRBC BLD-RTO: 0 /100 WBC
NUM UNITS TRANS PACKED RBC: NORMAL
PLATELET # BLD AUTO: 221 K/UL (ref 150–450)
PLATELET # BLD AUTO: 264 K/UL (ref 150–450)
PLATELET # BLD AUTO: 278 K/UL (ref 150–450)
PMV BLD AUTO: 11.5 FL (ref 9.2–12.9)
PMV BLD AUTO: 11.7 FL (ref 9.2–12.9)
PMV BLD AUTO: 12.6 FL (ref 9.2–12.9)
POC IONIZED CALCIUM: 1.19 MMOL/L (ref 1.06–1.42)
POCT GLUCOSE: 88 MG/DL (ref 70–110)
POTASSIUM BLD-SCNC: 4.1 MMOL/L (ref 3.5–5.1)
POTASSIUM SERPL-SCNC: 4.1 MMOL/L (ref 3.5–5.1)
PROT SERPL-MCNC: 6.6 G/DL (ref 6–8.4)
PROTHROMBIN TIME: 11 SEC (ref 9–12.5)
RBC # BLD AUTO: 2.8 M/UL (ref 4.6–6.2)
RBC # BLD AUTO: 3.13 M/UL (ref 4.6–6.2)
RBC # BLD AUTO: 3.31 M/UL (ref 4.6–6.2)
SAMPLE: ABNORMAL
SAMPLE: NORMAL
SATURATED IRON: 4 % (ref 20–50)
SODIUM BLD-SCNC: 141 MMOL/L (ref 136–145)
SODIUM SERPL-SCNC: 141 MMOL/L (ref 136–145)
SPECIMEN OUTDATE: NORMAL
SPECIMEN OUTDATE: NORMAL
TOTAL IRON BINDING CAPACITY: 403 UG/DL (ref 250–450)
TRANSFERRIN SERPL-MCNC: 272 MG/DL (ref 200–375)
WBC # BLD AUTO: 6.19 K/UL (ref 3.9–12.7)
WBC # BLD AUTO: 6.58 K/UL (ref 3.9–12.7)
WBC # BLD AUTO: 7.1 K/UL (ref 3.9–12.7)

## 2023-12-25 PROCEDURE — 20600001 HC STEP DOWN PRIVATE ROOM

## 2023-12-25 PROCEDURE — 86901 BLOOD TYPING SEROLOGIC RH(D): CPT | Performed by: EMERGENCY MEDICINE

## 2023-12-25 PROCEDURE — 85027 COMPLETE CBC AUTOMATED: CPT

## 2023-12-25 PROCEDURE — 86850 RBC ANTIBODY SCREEN: CPT | Mod: 91

## 2023-12-25 PROCEDURE — C9113 INJ PANTOPRAZOLE SODIUM, VIA: HCPCS | Performed by: EMERGENCY MEDICINE

## 2023-12-25 PROCEDURE — 85025 COMPLETE CBC W/AUTO DIFF WBC: CPT | Performed by: EMERGENCY MEDICINE

## 2023-12-25 PROCEDURE — 63600175 PHARM REV CODE 636 W HCPCS: Performed by: EMERGENCY MEDICINE

## 2023-12-25 PROCEDURE — 63600175 PHARM REV CODE 636 W HCPCS

## 2023-12-25 PROCEDURE — 96365 THER/PROPH/DIAG IV INF INIT: CPT | Mod: 59

## 2023-12-25 PROCEDURE — 85730 THROMBOPLASTIN TIME PARTIAL: CPT

## 2023-12-25 PROCEDURE — 96375 TX/PRO/DX INJ NEW DRUG ADDON: CPT | Mod: 59

## 2023-12-25 PROCEDURE — 36415 COLL VENOUS BLD VENIPUNCTURE: CPT

## 2023-12-25 PROCEDURE — G0378 HOSPITAL OBSERVATION PER HR: HCPCS

## 2023-12-25 PROCEDURE — 99285 EMERGENCY DEPT VISIT HI MDM: CPT | Mod: 25

## 2023-12-25 PROCEDURE — 25500020 PHARM REV CODE 255: Performed by: EMERGENCY MEDICINE

## 2023-12-25 PROCEDURE — 85610 PROTHROMBIN TIME: CPT

## 2023-12-25 PROCEDURE — C9113 INJ PANTOPRAZOLE SODIUM, VIA: HCPCS

## 2023-12-25 PROCEDURE — 83540 ASSAY OF IRON: CPT

## 2023-12-25 PROCEDURE — 82728 ASSAY OF FERRITIN: CPT

## 2023-12-25 PROCEDURE — P9016 RBC LEUKOCYTES REDUCED: HCPCS

## 2023-12-25 PROCEDURE — 93010 ELECTROCARDIOGRAM REPORT: CPT | Mod: ,,, | Performed by: INTERNAL MEDICINE

## 2023-12-25 PROCEDURE — 63600175 PHARM REV CODE 636 W HCPCS: Mod: JZ | Performed by: EMERGENCY MEDICINE

## 2023-12-25 PROCEDURE — 80053 COMPREHEN METABOLIC PANEL: CPT | Performed by: EMERGENCY MEDICINE

## 2023-12-25 PROCEDURE — 93005 ELECTROCARDIOGRAM TRACING: CPT

## 2023-12-25 PROCEDURE — 86920 COMPATIBILITY TEST SPIN: CPT

## 2023-12-25 PROCEDURE — 25000003 PHARM REV CODE 250

## 2023-12-25 RX ORDER — METRONIDAZOLE 500 MG/1
500 TABLET ORAL EVERY 8 HOURS
Status: DISCONTINUED | OUTPATIENT
Start: 2023-12-25 | End: 2023-12-27 | Stop reason: HOSPADM

## 2023-12-25 RX ORDER — GLUCAGON 1 MG
1 KIT INJECTION
Status: DISCONTINUED | OUTPATIENT
Start: 2023-12-25 | End: 2023-12-27 | Stop reason: HOSPADM

## 2023-12-25 RX ORDER — CIPROFLOXACIN 2 MG/ML
400 INJECTION, SOLUTION INTRAVENOUS
Status: COMPLETED | OUTPATIENT
Start: 2023-12-25 | End: 2023-12-25

## 2023-12-25 RX ORDER — HYDROCODONE BITARTRATE AND ACETAMINOPHEN 500; 5 MG/1; MG/1
TABLET ORAL
Status: DISCONTINUED | OUTPATIENT
Start: 2023-12-25 | End: 2023-12-27 | Stop reason: HOSPADM

## 2023-12-25 RX ORDER — NALOXONE HCL 0.4 MG/ML
0.02 VIAL (ML) INJECTION
Status: DISCONTINUED | OUTPATIENT
Start: 2023-12-25 | End: 2023-12-27 | Stop reason: HOSPADM

## 2023-12-25 RX ORDER — SODIUM CHLORIDE 0.9 % (FLUSH) 0.9 %
10 SYRINGE (ML) INJECTION EVERY 12 HOURS PRN
Status: DISCONTINUED | OUTPATIENT
Start: 2023-12-25 | End: 2023-12-27 | Stop reason: HOSPADM

## 2023-12-25 RX ORDER — IBUPROFEN 200 MG
24 TABLET ORAL
Status: DISCONTINUED | OUTPATIENT
Start: 2023-12-25 | End: 2023-12-27 | Stop reason: HOSPADM

## 2023-12-25 RX ORDER — IBUPROFEN 200 MG
16 TABLET ORAL
Status: DISCONTINUED | OUTPATIENT
Start: 2023-12-25 | End: 2023-12-27 | Stop reason: HOSPADM

## 2023-12-25 RX ORDER — METRONIDAZOLE 500 MG/100ML
500 INJECTION, SOLUTION INTRAVENOUS
Status: COMPLETED | OUTPATIENT
Start: 2023-12-25 | End: 2023-12-25

## 2023-12-25 RX ORDER — PANTOPRAZOLE SODIUM 40 MG/10ML
40 INJECTION, POWDER, LYOPHILIZED, FOR SOLUTION INTRAVENOUS 2 TIMES DAILY
Status: DISCONTINUED | OUTPATIENT
Start: 2023-12-25 | End: 2023-12-27 | Stop reason: HOSPADM

## 2023-12-25 RX ORDER — PANTOPRAZOLE SODIUM 40 MG/10ML
80 INJECTION, POWDER, LYOPHILIZED, FOR SOLUTION INTRAVENOUS
Status: COMPLETED | OUTPATIENT
Start: 2023-12-25 | End: 2023-12-25

## 2023-12-25 RX ORDER — CIPROFLOXACIN 500 MG/1
500 TABLET ORAL EVERY 12 HOURS
Status: DISCONTINUED | OUTPATIENT
Start: 2023-12-25 | End: 2023-12-27 | Stop reason: HOSPADM

## 2023-12-25 RX ADMIN — METRONIDAZOLE 500 MG: 500 TABLET ORAL at 10:12

## 2023-12-25 RX ADMIN — CIPROFLOXACIN 500 MG: 500 TABLET, FILM COATED ORAL at 08:12

## 2023-12-25 RX ADMIN — IOHEXOL 130 ML: 350 INJECTION, SOLUTION INTRAVENOUS at 09:12

## 2023-12-25 RX ADMIN — CIPROFLOXACIN 400 MG: 2 INJECTION, SOLUTION INTRAVENOUS at 11:12

## 2023-12-25 RX ADMIN — PANTOPRAZOLE SODIUM 40 MG: 40 INJECTION, POWDER, FOR SOLUTION INTRAVENOUS at 08:12

## 2023-12-25 RX ADMIN — PANTOPRAZOLE SODIUM 80 MG: 40 INJECTION, POWDER, LYOPHILIZED, FOR SOLUTION INTRAVENOUS at 11:12

## 2023-12-25 RX ADMIN — METRONIDAZOLE 500 MG: 5 INJECTION, SOLUTION INTRAVENOUS at 01:12

## 2023-12-25 NOTE — ED TRIAGE NOTES
"Pt reports to ED with rectal bleeding that he states made his pants wet. He was not having a BM when using the bathroom and reports "it is a lot". Pt denies dizziness, shortness of breath, or chest pain. Denies abdominal pain. Pt is not taking blood thinners. Aaox4.   "

## 2023-12-25 NOTE — ASSESSMENT & PLAN NOTE
Chronic, controlled. Latest blood pressure and vitals reviewed-     Temp:  [97.7 °F (36.5 °C)-98 °F (36.7 °C)]   Pulse:  [53-82]   Resp:  [11-20]   BP: (107-124)/(66-73)   SpO2:  [99 %-100 %] .   Home meds for hypertension were reviewed and noted below.   Hypertension Medications               amLODIPine (NORVASC) 5 MG tablet TAKE 1 TABLET(5 MG) BY MOUTH EVERY DAY    lisinopriL (PRINIVIL,ZESTRIL) 20 MG tablet TAKE 1 TABLET(20 MG) BY MOUTH EVERY DAY            While in the hospital, will manage blood pressure as follows    - hold home antihypertensives in setting of GI bleed and soft BPs  - resume as necessary   - will utilize p.r.n. blood pressure medication only if patient's blood pressure greater than 180/110 and he develops symptoms such as worsening chest pain or shortness of breath

## 2023-12-25 NOTE — HPI
74 yo M w/ hx of arthritis, hyperlipidemia, nephrolithiasis, and HTN who presented to [facility] ED with rectal bleeding. He reports onset of bleeding was this morning. He went to the bathroom because he noted his pants were wet and noted a large amount of blood in his pants. He reports he takes meloxicam daily for the past year for arthritis. Endorses lightheadedness/dizziness yesterday. Denies fever, chills, nausea, vomiting, hematemesis, diarrhea, constipation, melena, chest pain, and SOB. He reports he had a colonoscopy about 10 years ago which was normal. [place] [facility] [place] to [facility] not having endoscopy services today.     In [facility] ED, bleeding had since resolved. VSS and afebrile. Initial CBC w/ no leukocytosis, Hgb 8 (lower compared to prior, 14.7 in May), Hct 25.7. Also noted to have an TALI (Cr 1.2). CT A/P w/ no evidence of active bleeding but extensive colonic diverticulosis and inflammatory changes in distal sigmoid colon and rectum. Given PPI and abx (flagyl and cipro) due to concern for colitis. Transferred [place] [facility]. [place] [facility] repeat labs notable for drop in Hgb (7.1).

## 2023-12-25 NOTE — ASSESSMENT & PLAN NOTE
Patient's anemia is currently uncontrolled. Has received 1 units of PRBCs on   . Etiology likely d/t acute blood loss which was from GI bleed  Current CBC reviewed-   Lab Results   Component Value Date    HGB 7.1 (L) 12/25/2023    HCT 22.8 (L) 12/25/2023     - CBC q6h  - f/u iron studies  - f/u PT/INR, PTT  - monitor serial CBC and transfuse if patient becomes hemodynamically unstable, symptomatic or H/H drops below 7/21

## 2023-12-25 NOTE — ASSESSMENT & PLAN NOTE
74 yo M presenting w/ rectal bleeding that started this morning. Went to [facility] ED. In ED, bleeding had resolved. Hgb 8 (previously 14 in May) and repeat Hgb 7.1 at [facility]. CT A/P w/ no evidence of active bleeding but extensive colonic diverticulosis and inflammatory changes in distal sigmoid colon and rectum. On exam, has multiple hemorrhoids seen but no melena on GERARDO.   DDx includes lower GI bleed 2/2 diverticular bleed (leta in setting of brisk bleed) vs colitis (seen on imaging) vs hemorrhoids (see on exam) vs upper GI bleed 2/2 NSAID use resulting in gastric/duodenal ulcers (however, would expect melena).     - Transfused 1 unit PRBCs  - Pantoprazole 40mg IV BID   - GI consulted  - Clear liquid diet and NPO at midnight   - CBC q6h  - Transfuse to keep Hgb >7, plts >50  - Hold anticoagulants and NSAIDs   - If becomes hemodynamically unstable with associated large volume hematochezia, recommend STAT CTA and IR embolization if positive

## 2023-12-25 NOTE — SUBJECTIVE & OBJECTIVE
Past Medical History:   Diagnosis Date    Arthritis of right foot 10/05/2016    HTN (hypertension) 12/02/2014 2014       Past Surgical History:   Procedure Laterality Date    Right elbow  1988    SPINE SURGERY  1995    L-spine       Review of patient's allergies indicates:  No Known Allergies    No current facility-administered medications on file prior to encounter.     Current Outpatient Medications on File Prior to Encounter   Medication Sig    amLODIPine (NORVASC) 5 MG tablet TAKE 1 TABLET(5 MG) BY MOUTH EVERY DAY    cholecalciferol, vitamin D3, 1,000 unit capsule Take 2,000 Units by mouth once daily.    lisinopriL (PRINIVIL,ZESTRIL) 20 MG tablet TAKE 1 TABLET(20 MG) BY MOUTH EVERY DAY    meloxicam (MOBIC) 15 MG tablet TAKE 1 TABLET(15 MG) BY MOUTH DAILY WITH FOOD AS NEEDED FOR PAIN    minocycline (MINOCIN,DYNACIN) 100 MG capsule Take 1 capsule (100 mg total) by mouth 2 (two) times daily.     Family History       Problem Relation (Age of Onset)    Asthma Brother    Diabetes Mother    Stomach cancer Father          Tobacco Use    Smoking status: Never    Smokeless tobacco: Never   Substance and Sexual Activity    Alcohol use: No    Drug use: No    Sexual activity: Yes     Partners: Female     Review of Systems   Constitutional:  Negative for chills and fever.   HENT:  Negative for congestion and sore throat.    Respiratory:  Negative for shortness of breath.    Cardiovascular:  Negative for chest pain and leg swelling.   Gastrointestinal:  Positive for blood in stool. Negative for abdominal pain, constipation, diarrhea, nausea and vomiting.   Genitourinary:  Negative for dysuria and frequency.   Neurological:  Positive for light-headedness. Negative for syncope, weakness and headaches.     Objective:     Vital Signs (Most Recent):  Temp: 98 °F (36.7 °C) (12/25/23 1400)  Pulse: (!) 54 (12/25/23 1547)  Resp: 11 (12/25/23 1255)  BP: 111/67 (12/25/23 1400)  SpO2: 100 % (12/25/23 1400) Vital Signs (24h  Range):  Temp:  [97.9 °F (36.6 °C)-98 °F (36.7 °C)] 98 °F (36.7 °C)  Pulse:  [53-68] 54  Resp:  [11-20] 11  SpO2:  [99 %-100 %] 100 %  BP: (107-120)/(66-73) 111/67     Weight: 83.9 kg (185 lb)  Body mass index is 25.8 kg/m².     Physical Exam  Exam conducted with a chaperone present.   Constitutional:       General: He is not in acute distress.     Appearance: He is not ill-appearing.   HENT:      Head: Normocephalic and atraumatic.   Eyes:      Extraocular Movements: Extraocular movements intact.      Conjunctiva/sclera: Conjunctivae normal.   Cardiovascular:      Rate and Rhythm: Normal rate and regular rhythm.      Pulses: Normal pulses.   Pulmonary:      Effort: Pulmonary effort is normal.      Breath sounds: Normal breath sounds.   Abdominal:      General: Abdomen is flat. Bowel sounds are normal.      Palpations: Abdomen is soft.      Tenderness: There is no abdominal tenderness.   Genitourinary:     Rectum: External hemorrhoid present.   Musculoskeletal:      Right lower leg: No edema.      Left lower leg: No edema.   Skin:     General: Skin is warm and dry.   Neurological:      Mental Status: He is alert and oriented to person, place, and time.                Significant Labs: All pertinent labs within the past 24 hours have been reviewed.    Significant Imaging: I have reviewed all pertinent imaging results/findings within the past 24 hours.

## 2023-12-25 NOTE — ASSESSMENT & PLAN NOTE
Patient with acute kidney injury/acute renal failure likely due to pre-renal azotemia due to IVVD TALI is currently stable. Baseline creatinine  1  - Labs reviewed- Renal function/electrolytes with Estimated Creatinine Clearance: 57.5 mL/min (based on SCr of 1.2 mg/dL). according to latest data.     - Encourage PO intake  - Monitor urine output and serial BMP and adjust therapy as needed  - Avoid nephrotoxins and renally dose meds for GFR listed above

## 2023-12-25 NOTE — ASSESSMENT & PLAN NOTE
74 yo M presenting w/ rectal bleeding that started this morning. Went to [facility] ED. In ED, bleeding had resolved. Hgb 8 (previously 14 in May) and [place] 7.[place] [facility] [place] w/     Differential includes diverticular bleeding, , or gastric and duodenal erosions     - GI consulted  - Clear liquid diet and NPO at midnight   - Golytely prep likely to start tonight  - Monitor Hgb q6hrs  - Transfuse to keep Hgb >7, plts >50  - Hold anticoagulants  - If becomes hemodynamically unstable with associated large volume hematochezia, recommend STAT CTA and IR embolization if positive

## 2023-12-25 NOTE — ED PROVIDER NOTES
"  Source of History:  Medical record, patient, wife      Chief complaint:  Per triage note: "Rectal Bleeding (Pt bleeding from rectum, which he noticed because his pants were wet. Was not using the bathroom, states bleeding is "heavy". Denies pain, dizziness or fatigue. Not on thinners. States this happened once before but it was lighter and resolved on its own.)  "    HPI:    Patient presents with rectal bleeding that began this morning. He denies any significant pains and states that he just wants to be checked out to rule out any major issues. His bleeding was described as "heavy". He denies any associated lightheadedness, dizziness, chest pain, or dyspnea. Ne notes that this has happened one time in the past, but the bleeding was less intense and did not seek medical attention as the issue resolved itself. The only medication that he reports taking daily are lisinopril and Meloxicam. He denies any other sites of bleeding and denies any rashes.    Patient denies any other complaints at this time.    ROS:   See HPI for pertinent Review of Systems      Review of patient's allergies indicates:  No Known Allergies    PMH:  As per HPI and below:  Past Medical History:   Diagnosis Date    Arthritis of right foot 10/05/2016    HTN (hypertension) 12/02/2014 2014       Past Surgical History:   Procedure Laterality Date    Right elbow  1988    SPINE SURGERY  1995    L-spine       Social History     Tobacco Use    Smoking status: Never    Smokeless tobacco: Never   Substance Use Topics    Alcohol use: No    Drug use: No       Physical Exam:      Nursing note and vitals reviewed.  /66   Pulse (!) 56   Temp 97.9 °F (36.6 °C) (Oral)   Resp 20   Ht 5' 11" (1.803 m)   Wt 83.9 kg (185 lb)   SpO2 100%   BMI 25.80 kg/m²     Constitutional: No distress.  Streaks of dry blood on his posterior legs.   Eyes: EOMI. No discharge. Anicteric.  HENT:   Neck: Normal range of motion. Neck supple.  Cardiovascular: Normal rate. " "No murmur, no gallop and no friction rub heard.   Pulmonary/Chest: No respiratory distress. Effort normal. No wheezes, no rales, no rhonchi.   Abdominal: Bowel sounds normal. Soft. No distension and no mass. There is no tenderness. There is no rebound, no guarding, no tenderness at McBurney's point. BRBPR.   Neurological: GCS 15. Alert and oriented to person, place, and time. No gross cranial nerve, light touch or strength deficit. Coordination normal.   Skin: Skin is warm and dry.   EXT: 2+ radial pulses.   Psychiatric: Behavior is normal. Judgment normal.        Medical Decision Making / Independent Interpretations / External Records Reviewed:      Patient is a 73-year-old male with history of arthritis on chronic NSAIDs, hypertension who presents with heavy rectal bleeding for the past several hours.  Denies any associated presyncope, chest pain, dyspnea.  Denies any other bleeding, fevers, or abdominal pain  On exam, patient has dried blood on the backs of his legs, has a small amount of continued rectal bleeding, no abdominal tenderness.   The initial differential included acute blood loss anemia, diverticulitis, coagulopathy, upper GI bleed, lower GI bleed.     ED Course as of 12/25/23 1124   Mon Dec 25, 2023   0440 I independently reviewed and interpreted labs which are notable for acute blood loss anemia with hemoglobin 8, pre renal azotemia with BUN/Cr 32/1.2 which is consistent with GI bleed.   [RC]   0941 --  EKG Interpretation: I independently reviewed and interpreted EKG which shows normal sinus rhythm at 60 beats per minute, no STEMI, no significant acute ST/T abnormalities, normal intervals.  No acute change compared to prior tracing.  --   [RC]   1027 I independently reviewed and interpreted CTA abdomen/pelvis, notable for no free air, fluid collection, or evidence of obstructive process. There are "inflammatory changes involving surrounding the distal sigmoid colon and rectum, would be in keeping " "with a nonspecific infectious or noninfectious inflammatory colitis/proctitis." [RC]   1033 GI paged. Dr Bobby called back but call dropped.  [RC]   1048 Patient history, findings, results, patient management discussed with gastroenterologist Dr. Mckeon.  He recommends transfer as endoscopy and I are not currently available and patient is especially high-risk given his rate of blood loss.     [RC]   1119 Patient reports his bleeding has improved, perhaps resolved. He has little to no additional blood evident on Chux pads.    (Physician In Lead Of Transfers) Dr. Villalobos. He accepts the patient.       =====================================  Critical Care:  30 minutes total critical care time was personally spent by me, exclusive of procedures and separately billable time.   Critical care was necessary to treat or prevent imminent or life-threatening deterioration of the following conditions:  GI bleed, acute blood loss anemia   =====================================     [RC]      ED Course User Index  [RC] uHssain Thornton MD       I decided to obtain the patient's medical records. I reviewed patient's prior external notes / results: specialist note   .         Medications   pantoprazole injection 80 mg (has no administration in time range)   ciprofloxacin (CIPRO)400mg/200ml D5W IVPB 400 mg (has no administration in time range)   metronidazole IVPB 500 mg (has no administration in time range)   iohexoL (OMNIPAQUE 350) injection 130 mL (130 mLs Intravenous Given 12/25/23 0935)              Diagnostic Impression:    1. GI bleeding    2. Colitis    3. Acute blood loss anemia         ED Disposition Condition    Transfer to Another Facility Serious          Future Appointments   Date Time Provider Department Center   6/21/2024  9:00 AM WILIAN Morales MD [facility]           ---  I, Osmany De Luna , scribed for, and in the presence of, Dr. Thornton. I performed the scribed service and the documentation accurately describes " the services I performed. I attest to the accuracy of the note.     Physician Attestation for Scribe:   I, Hussain Thornton MD, reviewed documentation as scribed in my presence, which is both accurate and complete.      Hussain Thornton MD  12/25/23 1126

## 2023-12-25 NOTE — ASSESSMENT & PLAN NOTE
Takes meloxicam daily for the past year for arthritis. Now presenting w/ BRBPR.     - Held in setting of GI bleed

## 2023-12-25 NOTE — ASSESSMENT & PLAN NOTE
CT A/P w/ inflammation in distal sigmoid and rectum concerning for colitis. Given abx at OSH,    - continue cipro and flagyl

## 2023-12-26 ENCOUNTER — ANESTHESIA EVENT (OUTPATIENT)
Dept: ENDOSCOPY | Facility: HOSPITAL | Age: 73
End: 2023-12-26
Payer: MEDICARE

## 2023-12-26 LAB
ALBUMIN SERPL BCP-MCNC: 3.1 G/DL (ref 3.5–5.2)
ALP SERPL-CCNC: 54 U/L (ref 55–135)
ALT SERPL W/O P-5'-P-CCNC: 8 U/L (ref 10–44)
ANION GAP SERPL CALC-SCNC: 7 MMOL/L (ref 8–16)
AST SERPL-CCNC: 16 U/L (ref 10–40)
BASOPHILS # BLD AUTO: 0.06 K/UL (ref 0–0.2)
BASOPHILS # BLD AUTO: 0.07 K/UL (ref 0–0.2)
BASOPHILS NFR BLD: 1 % (ref 0–1.9)
BASOPHILS NFR BLD: 1.1 % (ref 0–1.9)
BILIRUB SERPL-MCNC: 0.5 MG/DL (ref 0.1–1)
BUN SERPL-MCNC: 18 MG/DL (ref 8–23)
CALCIUM SERPL-MCNC: 8.3 MG/DL (ref 8.7–10.5)
CHLORIDE SERPL-SCNC: 107 MMOL/L (ref 95–110)
CO2 SERPL-SCNC: 24 MMOL/L (ref 23–29)
CREAT SERPL-MCNC: 0.9 MG/DL (ref 0.5–1.4)
DIFFERENTIAL METHOD BLD: ABNORMAL
DIFFERENTIAL METHOD BLD: ABNORMAL
EOSINOPHIL # BLD AUTO: 0.1 K/UL (ref 0–0.5)
EOSINOPHIL # BLD AUTO: 0.1 K/UL (ref 0–0.5)
EOSINOPHIL NFR BLD: 2 % (ref 0–8)
EOSINOPHIL NFR BLD: 2.1 % (ref 0–8)
ERYTHROCYTE [DISTWIDTH] IN BLOOD BY AUTOMATED COUNT: 14.4 % (ref 11.5–14.5)
ERYTHROCYTE [DISTWIDTH] IN BLOOD BY AUTOMATED COUNT: 14.4 % (ref 11.5–14.5)
ERYTHROCYTE [DISTWIDTH] IN BLOOD BY AUTOMATED COUNT: 14.5 % (ref 11.5–14.5)
EST. GFR  (NO RACE VARIABLE): >60 ML/MIN/1.73 M^2
GLUCOSE SERPL-MCNC: 85 MG/DL (ref 70–110)
HCT VFR BLD AUTO: 23.4 % (ref 40–54)
HCT VFR BLD AUTO: 27.1 % (ref 40–54)
HCT VFR BLD AUTO: 29.2 % (ref 40–54)
HGB BLD-MCNC: 7.6 G/DL (ref 14–18)
HGB BLD-MCNC: 8.4 G/DL (ref 14–18)
HGB BLD-MCNC: 9.4 G/DL (ref 14–18)
IMM GRANULOCYTES # BLD AUTO: 0.01 K/UL (ref 0–0.04)
IMM GRANULOCYTES # BLD AUTO: 0.02 K/UL (ref 0–0.04)
IMM GRANULOCYTES NFR BLD AUTO: 0.2 % (ref 0–0.5)
IMM GRANULOCYTES NFR BLD AUTO: 0.3 % (ref 0–0.5)
LYMPHOCYTES # BLD AUTO: 1.3 K/UL (ref 1–4.8)
LYMPHOCYTES # BLD AUTO: 1.4 K/UL (ref 1–4.8)
LYMPHOCYTES NFR BLD: 20.1 % (ref 18–48)
LYMPHOCYTES NFR BLD: 21.3 % (ref 18–48)
MAGNESIUM SERPL-MCNC: 2 MG/DL (ref 1.6–2.6)
MCH RBC QN AUTO: 25.8 PG (ref 27–31)
MCH RBC QN AUTO: 25.9 PG (ref 27–31)
MCH RBC QN AUTO: 26.3 PG (ref 27–31)
MCHC RBC AUTO-ENTMCNC: 31 G/DL (ref 32–36)
MCHC RBC AUTO-ENTMCNC: 32.2 G/DL (ref 32–36)
MCHC RBC AUTO-ENTMCNC: 32.5 G/DL (ref 32–36)
MCV RBC AUTO: 80 FL (ref 82–98)
MCV RBC AUTO: 81 FL (ref 82–98)
MCV RBC AUTO: 84 FL (ref 82–98)
MONOCYTES # BLD AUTO: 0.6 K/UL (ref 0.3–1)
MONOCYTES # BLD AUTO: 0.6 K/UL (ref 0.3–1)
MONOCYTES NFR BLD: 8.6 % (ref 4–15)
MONOCYTES NFR BLD: 8.8 % (ref 4–15)
NEUTROPHILS # BLD AUTO: 4.3 K/UL (ref 1.8–7.7)
NEUTROPHILS # BLD AUTO: 4.3 K/UL (ref 1.8–7.7)
NEUTROPHILS NFR BLD: 66.8 % (ref 38–73)
NEUTROPHILS NFR BLD: 67.7 % (ref 38–73)
NRBC BLD-RTO: 0 /100 WBC
NRBC BLD-RTO: 0 /100 WBC
PHOSPHATE SERPL-MCNC: 3.2 MG/DL (ref 2.7–4.5)
PLATELET # BLD AUTO: 205 K/UL (ref 150–450)
PLATELET # BLD AUTO: 224 K/UL (ref 150–450)
PLATELET # BLD AUTO: 254 K/UL (ref 150–450)
PMV BLD AUTO: 11.5 FL (ref 9.2–12.9)
PMV BLD AUTO: 12 FL (ref 9.2–12.9)
PMV BLD AUTO: 12.2 FL (ref 9.2–12.9)
POTASSIUM SERPL-SCNC: 4.3 MMOL/L (ref 3.5–5.1)
PROT SERPL-MCNC: 5.5 G/DL (ref 6–8.4)
RBC # BLD AUTO: 2.89 M/UL (ref 4.6–6.2)
RBC # BLD AUTO: 3.24 M/UL (ref 4.6–6.2)
RBC # BLD AUTO: 3.65 M/UL (ref 4.6–6.2)
SODIUM SERPL-SCNC: 138 MMOL/L (ref 136–145)
WBC # BLD AUTO: 4.93 K/UL (ref 3.9–12.7)
WBC # BLD AUTO: 6.27 K/UL (ref 3.9–12.7)
WBC # BLD AUTO: 6.38 K/UL (ref 3.9–12.7)

## 2023-12-26 PROCEDURE — 99204 OFFICE O/P NEW MOD 45 MIN: CPT | Mod: GC,,, | Performed by: INTERNAL MEDICINE

## 2023-12-26 PROCEDURE — 96376 TX/PRO/DX INJ SAME DRUG ADON: CPT

## 2023-12-26 PROCEDURE — 84100 ASSAY OF PHOSPHORUS: CPT

## 2023-12-26 PROCEDURE — 25000003 PHARM REV CODE 250

## 2023-12-26 PROCEDURE — 63600175 PHARM REV CODE 636 W HCPCS

## 2023-12-26 PROCEDURE — G0378 HOSPITAL OBSERVATION PER HR: HCPCS

## 2023-12-26 PROCEDURE — 36415 COLL VENOUS BLD VENIPUNCTURE: CPT | Mod: XB

## 2023-12-26 PROCEDURE — 25000003 PHARM REV CODE 250: Performed by: STUDENT IN AN ORGANIZED HEALTH CARE EDUCATION/TRAINING PROGRAM

## 2023-12-26 PROCEDURE — 80053 COMPREHEN METABOLIC PANEL: CPT

## 2023-12-26 PROCEDURE — 85027 COMPLETE CBC AUTOMATED: CPT | Mod: 91

## 2023-12-26 PROCEDURE — C9113 INJ PANTOPRAZOLE SODIUM, VIA: HCPCS

## 2023-12-26 PROCEDURE — 83735 ASSAY OF MAGNESIUM: CPT

## 2023-12-26 PROCEDURE — 85025 COMPLETE CBC W/AUTO DIFF WBC: CPT | Mod: 91

## 2023-12-26 RX ORDER — POLYETHYLENE GLYCOL 3350, SODIUM SULFATE ANHYDROUS, SODIUM BICARBONATE, SODIUM CHLORIDE, POTASSIUM CHLORIDE 236; 22.74; 6.74; 5.86; 2.97 G/4L; G/4L; G/4L; G/4L; G/4L
4000 POWDER, FOR SOLUTION ORAL ONCE
Status: COMPLETED | OUTPATIENT
Start: 2023-12-26 | End: 2023-12-26

## 2023-12-26 RX ADMIN — PANTOPRAZOLE SODIUM 40 MG: 40 INJECTION, POWDER, FOR SOLUTION INTRAVENOUS at 09:12

## 2023-12-26 RX ADMIN — METRONIDAZOLE 500 MG: 500 TABLET ORAL at 02:12

## 2023-12-26 RX ADMIN — POLYETHYLENE GLYCOL 3350, SODIUM SULFATE ANHYDROUS, SODIUM BICARBONATE, SODIUM CHLORIDE, POTASSIUM CHLORIDE 4000 ML: 236; 22.74; 6.74; 5.86; 2.97 POWDER, FOR SOLUTION ORAL at 05:12

## 2023-12-26 RX ADMIN — METRONIDAZOLE 500 MG: 500 TABLET ORAL at 06:12

## 2023-12-26 RX ADMIN — PANTOPRAZOLE SODIUM 40 MG: 40 INJECTION, POWDER, FOR SOLUTION INTRAVENOUS at 08:12

## 2023-12-26 RX ADMIN — METRONIDAZOLE 500 MG: 500 TABLET ORAL at 08:12

## 2023-12-26 RX ADMIN — SODIUM CHLORIDE, POTASSIUM CHLORIDE, SODIUM LACTATE AND CALCIUM CHLORIDE 1000 ML: 600; 310; 30; 20 INJECTION, SOLUTION INTRAVENOUS at 06:12

## 2023-12-26 RX ADMIN — CIPROFLOXACIN 500 MG: 500 TABLET, FILM COATED ORAL at 08:12

## 2023-12-26 RX ADMIN — CIPROFLOXACIN 500 MG: 500 TABLET, FILM COATED ORAL at 09:12

## 2023-12-26 NOTE — ASSESSMENT & PLAN NOTE
CT A/P w/ inflammation in distal sigmoid and rectum concerning for colitis. Given abx at OSH.     - continue cipro and flagyl

## 2023-12-26 NOTE — ASSESSMENT & PLAN NOTE
72 yo M presenting w/ rectal bleeding that started this morning. Went to [facility] ED. In ED, bleeding had resolved. Hgb 8 (previously 14 in May) and repeat Hgb 7.1 at [facility] [place]. [place] A/P w/ no evidence of active bleeding but extensive colonic diverticulosis and inflammatory changes in distal sigmoid colon and rectum. On exam, has multiple hemorrhoids seen but no melena on GERARDO. Given PPI and abx at [facility].   DDx includes lower GI bleed 2/2 diverticular bleed (leta in setting of brisk bleed) vs colitis (seen on imaging) vs hemorrhoids (see on exam) vs upper GI bleed 2/2 NSAID use resulting in gastric/duodenal ulcers (however, would expect melena).      Transfused 1 unit PRBCs on 12/25.     - GI consulted; plans for EGD/colonoscopy 12/27   - clear liquid diet and NPO at midnight   - pantoprazole 40mg IV BID   - continue cipro and flagyl    - CBC q6h  - transfuse to keep Hgb >7, plts >50  - hold anticoagulants and NSAIDs   - if becomes hemodynamically unstable with associated large volume hematochezia, recommend STAT CTA and IR embolization if positive

## 2023-12-26 NOTE — ASSESSMENT & PLAN NOTE
RESOLVED  Patient with acute kidney injury/acute renal failure likely due to pre-renal azotemia due to IVVD ATLI is currently stable. Baseline creatinine  1  - Labs reviewed- Renal function/electrolytes with Estimated Creatinine Clearance: 76.7 mL/min (based on SCr of 0.9 mg/dL). according to latest data.     - encourage PO intake  - monitor urine output and serial BMP and adjust therapy as needed  - avoid nephrotoxins and renally dose meds for GFR listed above

## 2023-12-26 NOTE — ASSESSMENT & PLAN NOTE
The patient is being consulted to gastroenterology for bright red blood per rectum from yesterday.  Rectal bleeding is not associated with abdominal pain, fever , chills, and diarrhea.  CTA during this visit was unremarkable, however it did showed inflammatory changes surrounding sigmoid colon/coloitis.  Being treated for colitis with cipro and flagyl.  Hbg trend 8.0>8.5>7.6, and it was 14.7 seven months ago    Recommendations  Pantoprazole 40 mg IV BID  Continue clear liquid diet for now  Bowel prep, EGD and Colonoscopy tomorrow.   Continue t monitor Hbg, transfuse if needed to keep target Hbg >7  Avoid NSAIDs, and anticoagulants.

## 2023-12-26 NOTE — TREATMENT PLAN
Please make sure patient starts Golytely prep at 6PM. Prep should be completed within 2 hours for best results, but must be completed within 4 hours of starting the prep.    Enrique Garcia, DO  Gastroenterology PGY-4

## 2023-12-26 NOTE — PROGRESS NOTES
[facility]  Progress Note    Patient Name: Jerman Miguel  MRN: [id number]  Patient Class: IP- Inpatient   Admission Date: 12/25/2023  Length of Stay: 1 days  Attending Physician: Colleen Mccauley MD  Primary Care Provider: WILIAN Morales MD        Subjective:     Principal Problem:GI bleed        HPI:  72 yo M w/ hx of arthritis, hyperlipidemia, nephrolithiasis, and HTN who presented to [facility] ED with rectal bleeding. He reports onset of bleeding was this morning. He went to the bathroom because he noted his pants were wet and noted a large amount of blood in his pants. He reports he takes meloxicam daily for the past year for arthritis. Endorses lightheadedness/dizziness yesterday. Denies fever, chills, nausea, vomiting, hematemesis, diarrhea, constipation, melena, chest pain, and SOB. He reports he had a colonoscopy about 10 years ago which was normal. He was [place] [facility] due to [facility] not having endoscopy services today.     In [facility] ED, bleeding had since resolved. VSS and afebrile. Initial CBC w/ no leukocytosis, Hgb 8 (lower compared to prior, 14.7 in May), Hct 25.7. Also noted to have an TALI (Cr 1.2). CT A/P w/ no evidence of active bleeding but extensive colonic diverticulosis and inflammatory changes in distal sigmoid colon and rectum. Given PPI and abx (flagyl and cipro) due to concern for colitis. Transferred to [facility]. At [facility], repeat labs notable for drop in Hgb (7.1).               Overview/Hospital Course:  Transferred for GI bleed. Bleeding has since resolved. Hgb initially 8 at OSH -> 7.1. Transfused 1 unit pRBCs. Continuing Abx for possible colitis. GI consulted. Plans for EGD/colonscopy on 12/27. Also had TALI on admission which has resolved.     Interval History: NAEON. Hypotensive this AM. All other VSS. No further episodes of bleeding. Reporting no pain. GI w/ plans of EGD/colonoscopy tomorrow.     Review of Systems   Constitutional:   Negative for chills and fever.   HENT:  Negative for congestion and sore throat.    Respiratory:  Negative for shortness of breath.    Cardiovascular:  Negative for chest pain and leg swelling.   Gastrointestinal:  Positive for blood in stool. Negative for abdominal pain, constipation, diarrhea, nausea and vomiting.   Genitourinary:  Negative for dysuria and frequency.   Neurological:  Positive for light-headedness. Negative for syncope, weakness and headaches.   Objective:     Vital Signs (Most Recent):  Temp: 96.3 °F (35.7 °C) (12/26/23 1121)  Pulse: 60 (12/26/23 1121)  Resp: 18 (12/26/23 1121)  BP: 117/67 (12/26/23 1121)  SpO2: (!) 94 % (12/26/23 1121) Vital Signs (24h Range):  Temp:  [96.3 °F (35.7 °C)-98.3 °F (36.8 °C)] 96.3 °F (35.7 °C)  Pulse:  [53-82] 60  Resp:  [11-18] 18  SpO2:  [94 %-100 %] 94 %  BP: ()/(54-72) 117/67     Weight: 83.9 kg (185 lb)  Body mass index is 25.8 kg/m².    Intake/Output Summary (Last 24 hours) at 12/26/2023 1225  Last data filed at 12/26/2023 0144  Gross per 24 hour   Intake 523 ml   Output --   Net 523 ml         Physical Exam   Constitutional:       General: He is not in acute distress.     Appearance: He is not ill-appearing.   HENT:      Head: Normocephalic and atraumatic.   Eyes:      Extraocular Movements: Extraocular movements intact.      Conjunctiva/sclera: Conjunctivae normal.   Cardiovascular:      Rate and Rhythm: Normal rate and regular rhythm.      Pulses: Normal pulses.   Pulmonary:      Effort: Pulmonary effort is normal.      Breath sounds: Normal breath sounds.   Abdominal:      General: Abdomen is flat. Bowel sounds are normal.      Palpations: Abdomen is soft.      Tenderness: There is no abdominal tenderness.   Genitourinary:     Rectum: External hemorrhoid present.      Comments: GERARDO w/ no melena   Musculoskeletal:      Right lower leg: No edema.      Left lower leg: No edema.   Skin:     General: Skin is warm and dry.   Neurological:      Mental  Status: He is alert and oriented to person, place, and time.        Significant Labs: All pertinent labs within the past 24 hours have been reviewed.    Significant Imaging: I have reviewed all pertinent imaging results/findings within the past 24 hours.    Assessment/Plan:      * GI bleed  72 yo M presenting w/ rectal bleeding that started this morning. Went to [facility] ED. In ED, bleeding had resolved. Hgb 8 (previously 14 in May) and repeat Hgb 7.1 at [facility] [place] A/P w/ no evidence of active bleeding but extensive colonic diverticulosis and inflammatory changes in distal sigmoid colon and rectum. On exam, has multiple hemorrhoids seen but no melena on GERARDO. Given PPI and abx at [facility].   DDx includes lower GI bleed 2/2 diverticular bleed (leta in setting of brisk bleed) vs colitis (seen on imaging) vs hemorrhoids (see on exam) vs upper GI bleed 2/2 NSAID use resulting in gastric/duodenal ulcers (however, would expect melena).      Transfused 1 unit PRBCs on 12/25.     - GI consulted; plans for EGD/colonoscopy 12/27   - clear liquid diet and NPO at midnight   - pantoprazole 40mg IV BID   - continue cipro and flagyl    - CBC q6h  - transfuse to keep Hgb >7, plts >50  - hold anticoagulants and NSAIDs   - if becomes hemodynamically unstable with associated large volume hematochezia, recommend STAT CTA and IR embolization if positive        Colitis  CT A/P w/ inflammation in distal sigmoid and rectum concerning for colitis. Given abx at OSH.     - continue cipro and flagyl   - see GI bleed      Acute blood loss anemia  Patient's anemia is currently uncontrolled. Has received 1 units of PRBCs. Etiology likely d/t acute blood loss which was from GI bleed   Current CBC reviewed-   Lab Results   Component Value Date    HGB 8.4 (L) 12/26/2023    HCT 27.1 (L) 12/26/2023     - CBC q6h  - iron studies c/w SURINDER  - PT/INR and PTT wnl  - monitor serial CBC and transfuse if patient becomes hemodynamically unstable, symptomatic or H/H  drops below 7/21     TALI (acute kidney injury)  RESOLVED  Patient with acute kidney injury/acute renal failure likely due to pre-renal azotemia due to IVVD TALI is currently stable. Baseline creatinine  1  - Labs reviewed- Renal function/electrolytes with Estimated Creatinine Clearance: 76.7 mL/min (based on SCr of 0.9 mg/dL). according to latest data.     - encourage PO intake  - monitor urine output and serial BMP and adjust therapy as needed  - avoid nephrotoxins and renally dose meds for GFR listed above         Chronic pain of left knee  Takes meloxicam daily for the past year for arthritis. Now presenting w/ BRBPR.     - held in setting of GI bleed       Essential hypertension  Chronic, controlled. Latest blood pressure and vitals reviewed-     Temp:  [96.3 °F (35.7 °C)-98.3 °F (36.8 °C)]   Pulse:  [53-82]   Resp:  [11-18]   BP: ()/(54-72)   SpO2:  [94 %-100 %] .   Home meds for hypertension were reviewed and noted below.   Hypertension Medications               amLODIPine (NORVASC) 5 MG tablet TAKE 1 TABLET(5 MG) BY MOUTH EVERY DAY    lisinopriL (PRINIVIL,ZESTRIL) 20 MG tablet TAKE 1 TABLET(20 MG) BY MOUTH EVERY DAY            While in the hospital, will manage blood pressure as follows    - hold home antihypertensives in setting of GI bleed and soft BPs  - resume as necessary   - will utilize p.r.n. blood pressure medication only if patient's blood pressure greater than 180/110 and he develops symptoms such as worsening chest pain or shortness of breath      VTE Risk Mitigation (From admission, onward)           Ordered     Reason for No Pharmacological VTE Prophylaxis  Once        Question:  Reasons:  Answer:  Active Bleeding    12/25/23 1555     IP VTE HIGH RISK PATIENT  Once         12/25/23 1555     Place sequential compression device  Until discontinued         12/25/23 1555                    Discharge Planning   JUAN CARLOS: 12/27/2023     Code Status: Full Code   Is the patient medically ready for  discharge?: No    Reason for patient still in hospital (select all that apply): Patient trending condition, Laboratory test, Treatment, Imaging, and Consult recommendations               Kiersten Pak MD  Department of Hospital Medicine   [facility] - Telemetry Stepdown                      12/26/2023                             STAFF PHYSICIAN NOTE                                   Attending Attestation for Rounds with Resident  I have reviewed and concur with the resident's history, physical, assessment, and plan.  I have personally interviewed and examined the patient at bedside and agree with the resident's findings.                  73 y.o. HTN, CKD3, presents w bright red rectal bleeding, on NSAID for OA. + hemorrhoids on rectal exam. CTA a/p - colitis and diverticulosis. Resolved TALI. Hb 14-> 8.1-> 7.1.  Transfusion. GI consulted - EGD/colonoscopy planned 12/27. Serial h/h, PPI, Abx for colitis.                       Colleen Mccauley MD  Senior Hospitalist  Department of Hospital Medicine  [facility] [address], [phone]

## 2023-12-26 NOTE — SUBJECTIVE & OBJECTIVE
Past Medical History:   Diagnosis Date    Arthritis of right foot 10/05/2016    HTN (hypertension) 12/02/2014 2014       Past Surgical History:   Procedure Laterality Date    Right elbow  1988    SPINE SURGERY  1995    L-spine       Review of patient's allergies indicates:  No Known Allergies  Family History       Problem Relation (Age of Onset)    Asthma Brother    Diabetes Mother    Stomach cancer Father          Tobacco Use    Smoking status: Never    Smokeless tobacco: Never   Substance and Sexual Activity    Alcohol use: No    Drug use: No    Sexual activity: Yes     Partners: Female     Review of Systems   Constitutional:  Negative for activity change, appetite change, chills, diaphoresis and fatigue.   HENT:  Negative for congestion, mouth sores and nosebleeds.    Cardiovascular:  Negative for chest pain, palpitations and leg swelling.   Gastrointestinal:  Positive for anal bleeding. Negative for abdominal distention, abdominal pain, blood in stool, constipation, diarrhea, nausea, rectal pain and vomiting.   Endocrine: Negative for cold intolerance, polydipsia and polyphagia.   Genitourinary:  Negative for dysuria and flank pain.   Neurological:  Negative for dizziness, syncope and headaches.     Objective:     Vital Signs (Most Recent):  Temp: 97.8 °F (36.6 °C) (12/26/23 0721)  Pulse: (!) 58 (12/26/23 0721)  Resp: 18 (12/26/23 0721)  BP: 114/69 (12/26/23 0721)  SpO2: 100 % (12/26/23 0721) Vital Signs (24h Range):  Temp:  [97.7 °F (36.5 °C)-98.3 °F (36.8 °C)] 97.8 °F (36.6 °C)  Pulse:  [53-82] 58  Resp:  [11-20] 18  SpO2:  [96 %-100 %] 100 %  BP: ()/(54-73) 114/69     Weight: 83.9 kg (185 lb) (12/25/23 0846)  Body mass index is 25.8 kg/m².      Intake/Output Summary (Last 24 hours) at 12/26/2023 0858  Last data filed at 12/26/2023 0144  Gross per 24 hour   Intake 523 ml   Output --   Net 523 ml       Lines/Drains/Airways       Peripheral Intravenous Line  Duration                  Peripheral IV -  Single Lumen 12/25/23 0902 20 G Right Upper Arm <1 day         Peripheral IV - Single Lumen 12/25/23 1301 20 G Right Wrist <1 day                     Physical Exam  Constitutional:       Appearance: Normal appearance. He is normal weight.   HENT:      Head: Normocephalic.   Cardiovascular:      Rate and Rhythm: Normal rate and regular rhythm.      Pulses: Normal pulses.      Heart sounds: Normal heart sounds.   Pulmonary:      Effort: Pulmonary effort is normal. No respiratory distress.      Breath sounds: No stridor. No wheezing, rhonchi or rales.   Chest:      Chest wall: No tenderness.   Abdominal:      General: Abdomen is flat. Bowel sounds are normal. There is no distension.      Palpations: Abdomen is soft. There is no mass.      Tenderness: There is no abdominal tenderness. There is no right CVA tenderness, left CVA tenderness, guarding or rebound.      Hernia: No hernia is present.   Musculoskeletal:         General: Normal range of motion.      Cervical back: Normal range of motion and neck supple.   Neurological:      General: No focal deficit present.      Mental Status: He is alert and oriented to person, place, and time.   Psychiatric:         Mood and Affect: Mood normal.         Behavior: Behavior normal.          Significant Labs:  CBC:   Recent Labs   Lab 12/25/23  1302 12/25/23  1738 12/26/23  0524   WBC 7.10 6.58 4.93   HGB 7.1* 8.5* 7.6*   HCT 22.8* 27.7* 23.4*    264 205     CMP:   Recent Labs   Lab 12/26/23  0524   GLU 85   CALCIUM 8.3*   ALBUMIN 3.1*   PROT 5.5*      K 4.3   CO2 24      BUN 18   CREATININE 0.9   ALKPHOS 54*   ALT 8*   AST 16   BILITOT 0.5       Significant Imaging:  Imaging results within the past 24 hours have been reviewed.

## 2023-12-26 NOTE — CONSULTS
[facility] - Telemetry Stepdown  Gastroenterology  Consult Note    Patient Name: Jerman Miguel  MRN: [id number]  Admission Date: 12/25/2023  Hospital Length of Stay: 1 days  Code Status: Full Code   Attending Provider: Colleen Mccauley MD   Consulting Provider: Kt Aranda MD  Primary Care Physician: WILIAN Morales MD  Principal Problem:GI bleed    Inpatient consult to Gastroenterology  Consult performed by: Kt Aranda MD  Consult ordered by: Rajan Silvestre DO        Subjective:     HPI:  73 years old male patient with ongoing medical history of pan colonic diverticulosis is being consulted to gastroenterology for rectal bleeding from yesterday.   The patient has the same rectal bleeding episodes many years ago, underwent Colonoscopy which was unremarkable.    The patient is being seen and examined on the bedside, alert and oriented, denied any nausea, vomiting, diarrhea and abdominal pain; however, he stated while going to the restroom he noticed bright red blood per rectum. Denied any constipation, fever, chills or any recent flue like illness. The patient went to [facility], and was transferred to our facility for possible endoscopy for GI bleed. Denied any cardiac history, no anticoagulation, however, he is taking Meloxicam for arthritis from 1 year.Past medical history is significant for Arthritis, HL;D, Nephrolithiasis and HTN. No family history of colon cancer, lung cancer, breast/ovarian cancer.    GERARDO: Normal anal sphincter tone, no hemrroirds or mass noted, no fresh red blood noted, brown color stools on examination.  CTA 12/25/2023 showed  no intraluminal intestinal, intraperitoneal, or retroperitoneal hemorrhage, Inflammatory changes involving surrounding the distal sigmoid colon and rectum, would be in keeping with a nonspecific infectious or noninfectious inflammatory colitis/proctitis.   CT abdomen and Pelvis 09/25/2021:  Circumferential rectal wall thickening and trace  perirectal fluid which could represent evidence of proctitis however correlation with prior colonoscopy recommended as underlying mass not excluded. Pan colonic diverticulosis without evidence for diverticulitis.  Colonoscopy: 10 years ago at [facility]    Since admission the Hbg trend 8.0>8.5>7.6, and it was 14.7 seven months ago. HCT 24%, MCV 81, ALP 54, AST 16, ALT 8  Ferritin 12, iron 16, saturated iron 4, BUN trend 20>32>18.    Currently the patient is being treated with ciprofloxacin+flagyl, Pantoprazole 40 mg IV BID. And also being transfused with 1 pack of RBCs      Past Medical History:   Diagnosis Date    Arthritis of right foot 10/05/2016    HTN (hypertension) 12/02/2014 2014       Past Surgical History:   Procedure Laterality Date    Right elbow  1988    SPINE SURGERY  1995    L-spine       Review of patient's allergies indicates:  No Known Allergies  Family History       Problem Relation (Age of Onset)    Asthma Brother    Diabetes Mother    Stomach cancer Father          Tobacco Use    Smoking status: Never    Smokeless tobacco: Never   Substance and Sexual Activity    Alcohol use: No    Drug use: No    Sexual activity: Yes     Partners: Female     Review of Systems   Constitutional:  Negative for activity change, appetite change, chills, diaphoresis and fatigue.   HENT:  Negative for congestion, mouth sores and nosebleeds.    Cardiovascular:  Negative for chest pain, palpitations and leg swelling.   Gastrointestinal:  Positive for anal bleeding. Negative for abdominal distention, abdominal pain, blood in stool, constipation, diarrhea, nausea, rectal pain and vomiting.   Endocrine: Negative for cold intolerance, polydipsia and polyphagia.   Genitourinary:  Negative for dysuria and flank pain.   Neurological:  Negative for dizziness, syncope and headaches.     Objective:     Vital Signs (Most Recent):  Temp: 97.8 °F (36.6 °C) (12/26/23 0721)  Pulse: (!) 58 (12/26/23 0721)  Resp: 18 (12/26/23 0721)  BP:  114/69 (12/26/23 0721)  SpO2: 100 % (12/26/23 0721) Vital Signs (24h Range):  Temp:  [97.7 °F (36.5 °C)-98.3 °F (36.8 °C)] 97.8 °F (36.6 °C)  Pulse:  [53-82] 58  Resp:  [11-20] 18  SpO2:  [96 %-100 %] 100 %  BP: ()/(54-73) 114/69     Weight: 83.9 kg (185 lb) (12/25/23 0846)  Body mass index is 25.8 kg/m².      Intake/Output Summary (Last 24 hours) at 12/26/2023 0858  Last data filed at 12/26/2023 0144  Gross per 24 hour   Intake 523 ml   Output --   Net 523 ml       Lines/Drains/Airways       Peripheral Intravenous Line  Duration                  Peripheral IV - Single Lumen 12/25/23 0902 20 G Right Upper Arm <1 day         Peripheral IV - Single Lumen 12/25/23 1301 20 G Right Wrist <1 day                     Physical Exam  Constitutional:       Appearance: Normal appearance. He is normal weight.   HENT:      Head: Normocephalic.   Cardiovascular:      Rate and Rhythm: Normal rate and regular rhythm.      Pulses: Normal pulses.      Heart sounds: Normal heart sounds.   Pulmonary:      Effort: Pulmonary effort is normal. No respiratory distress.      Breath sounds: No stridor. No wheezing, rhonchi or rales.   Chest:      Chest wall: No tenderness.   Abdominal:      General: Abdomen is flat. Bowel sounds are normal. There is no distension.      Palpations: Abdomen is soft. There is no mass.      Tenderness: There is no abdominal tenderness. There is no right CVA tenderness, left CVA tenderness, guarding or rebound.      Hernia: No hernia is present.   Musculoskeletal:         General: Normal range of motion.      Cervical back: Normal range of motion and neck supple.   Neurological:      General: No focal deficit present.      Mental Status: He is alert and oriented to person, place, and time.   Psychiatric:         Mood and Affect: Mood normal.         Behavior: Behavior normal.          Significant Labs:  CBC:   Recent Labs   Lab 12/25/23  1302 12/25/23  1738 12/26/23  0524   WBC 7.10 6.58 4.93   HGB 7.1* 8.5*  7.6*   HCT 22.8* 27.7* 23.4*    264 205     CMP:   Recent Labs   Lab 12/26/23  0524   GLU 85   CALCIUM 8.3*   ALBUMIN 3.1*   PROT 5.5*      K 4.3   CO2 24      BUN 18   CREATININE 0.9   ALKPHOS 54*   ALT 8*   AST 16   BILITOT 0.5       Significant Imaging:  Imaging results within the past 24 hours have been reviewed.  Assessment/Plan:     GI  * GI bleed  The patient is being consulted to gastroenterology for bright red blood per rectum from yesterday.  Rectal bleeding is not associated with abdominal pain, fever , chills, and diarrhea.  CTA during this visit was unremarkable, however it did showed inflammatory changes surrounding sigmoid colon/coloitis.  Being treated for colitis with cipro and flagyl.  Hbg trend 8.0>8.5>7.6, and it was 14.7 seven months ago    Recommendations  Pantoprazole 40 mg IV BID  Continue clear liquid diet for now  Bowel prep, EGD and Colonoscopy tomorrow.   Continue to monitor Hbg, transfuse if needed to keep target Hbg >7  Avoid NSAIDs, and anticoagulants.          Thank you for your consult. I will follow-up with patient. Please contact us if you have any additional questions.    Kt Aranda MD  Gastroenterology  [facility] - Telemetry Stepdown

## 2023-12-26 NOTE — H&P
"  [facility] - Telemetry [facility] Medicine  History & Physical    Patient Name: Jerman Miguel  MRN: [id number]  Patient Class: IP- Inpatient  Admission Date: 12/25/2023  Attending Physician: Colleen Mccauley MD   Primary Care Provider: WILIAN Morales MD         Patient information was obtained from patient and ER records.     Subjective:     Principal Problem:GI bleed    Chief Complaint:   Chief Complaint   Patient presents with    Rectal Bleeding     Pt bleeding from rectum, which he noticed because his pants were wet. Was not using the bathroom, states bleeding is "heavy". Denies pain, dizziness or fatigue. Not on thinners. States this happened once before but it was lighter and resolved on its own.        HPI: 72 yo M w/ hx of arthritis, hyperlipidemia, nephrolithiasis, and HTN who presented to [facility] ED with rectal bleeding. He reports onset of bleeding was this morning. He went to the bathroom because he noted his pants were wet and noted a large amount of blood in his pants. He reports he takes meloxicam daily for the past year for arthritis. Endorses lightheadedness/dizziness yesterday. Denies fever, chills, nausea, vomiting, hematemesis, diarrhea, constipation, melena, chest pain, and SOB. He reports he had a colonoscopy about 10 years ago which was normal. He was transferred to [facility] due to [facility] not having endoscopy services today.     In [facility] ED, bleeding had since resolved. VSS and afebrile. Initial CBC w/ no leukocytosis, Hgb 8 (lower compared to prior, 14.7 in May), Hct 25.7. Also noted to have an TALI (Cr 1.2). CT A/P w/ no evidence of active bleeding but extensive colonic diverticulosis and inflammatory changes in distal sigmoid colon and rectum. Given PPI and abx (flagyl and cipro) due to concern for colitis. Transferred to [facility]. At [facility], repeat labs notable for drop in Hgb (7.1).               Past Medical History:   Diagnosis Date    Arthritis of right foot 10/05/2016    HTN " (hypertension) 12/02/2014 2014       Past Surgical History:   Procedure Laterality Date    Right elbow  1988    SPINE SURGERY  1995    L-spine       Review of patient's allergies indicates:  No Known Allergies    No current facility-administered medications on file prior to encounter.     Current Outpatient Medications on File Prior to Encounter   Medication Sig    amLODIPine (NORVASC) 5 MG tablet TAKE 1 TABLET(5 MG) BY MOUTH EVERY DAY    cholecalciferol, vitamin D3, 1,000 unit capsule Take 2,000 Units by mouth once daily.    lisinopriL (PRINIVIL,ZESTRIL) 20 MG tablet TAKE 1 TABLET(20 MG) BY MOUTH EVERY DAY    meloxicam (MOBIC) 15 MG tablet TAKE 1 TABLET(15 MG) BY MOUTH DAILY WITH FOOD AS NEEDED FOR PAIN    minocycline (MINOCIN,DYNACIN) 100 MG capsule Take 1 capsule (100 mg total) by mouth 2 (two) times daily.     Family History       Problem Relation (Age of Onset)    Asthma Brother    Diabetes Mother    Stomach cancer Father          Tobacco Use    Smoking status: Never    Smokeless tobacco: Never   Substance and Sexual Activity    Alcohol use: No    Drug use: No    Sexual activity: Yes     Partners: Female     Review of Systems   Constitutional:  Negative for chills and fever.   HENT:  Negative for congestion and sore throat.    Respiratory:  Negative for shortness of breath.    Cardiovascular:  Negative for chest pain and leg swelling.   Gastrointestinal:  Positive for blood in stool. Negative for abdominal pain, constipation, diarrhea, nausea and vomiting.   Genitourinary:  Negative for dysuria and frequency.   Neurological:  Positive for light-headedness. Negative for syncope, weakness and headaches.     Objective:     Vital Signs (Most Recent):  Temp: 98 °F (36.7 °C) (12/25/23 1400)  Pulse: (!) 54 (12/25/23 1547)  Resp: 11 (12/25/23 1255)  BP: 111/67 (12/25/23 1400)  SpO2: 100 % (12/25/23 1400) Vital Signs (24h Range):  Temp:  [97.9 °F (36.6 °C)-98 °F (36.7 °C)] 98 °F (36.7 °C)  Pulse:  [53-68] 54  Resp:   [11-20] 11  SpO2:  [99 %-100 %] 100 %  BP: (107-120)/(66-73) 111/67     Weight: 83.9 kg (185 lb)  Body mass index is 25.8 kg/m².     Physical Exam  Exam conducted with a chaperone present.   Constitutional:       General: He is not in acute distress.     Appearance: He is not ill-appearing.   HENT:      Head: Normocephalic and atraumatic.   Eyes:      Extraocular Movements: Extraocular movements intact.      Conjunctiva/sclera: Conjunctivae normal.   Cardiovascular:      Rate and Rhythm: Normal rate and regular rhythm.      Pulses: Normal pulses.   Pulmonary:      Effort: Pulmonary effort is normal.      Breath sounds: Normal breath sounds.   Abdominal:      General: Abdomen is flat. Bowel sounds are normal.      Palpations: Abdomen is soft.      Tenderness: There is no abdominal tenderness.   Genitourinary:     Rectum: External hemorrhoid present.      Comments: GERARDO w/ no melena   Musculoskeletal:      Right lower leg: No edema.      Left lower leg: No edema.   Skin:     General: Skin is warm and dry.   Neurological:      Mental Status: He is alert and oriented to person, place, and time.                Significant Labs: All pertinent labs within the past 24 hours have been reviewed.    Significant Imaging: I have reviewed all pertinent imaging results/findings within the past 24 hours.  Assessment/Plan:     * GI bleed  74 yo M presenting w/ rectal bleeding that started this morning. Went to [facility] ED. In ED, bleeding had resolved. Hgb 8 (previously 14 in May) and repeat Hgb 7.1 at [facility]. CT A/P w/ no evidence of active bleeding but extensive colonic diverticulosis and inflammatory changes in distal sigmoid colon and rectum. On exam, has multiple hemorrhoids seen but no melena on GERARDO. Given PPI and abx at [facility].   DDx includes lower GI bleed 2/2 diverticular bleed (leta in setting of brisk bleed) vs colitis (seen on imaging) vs hemorrhoids (see on exam) vs upper GI bleed 2/2 NSAID use resulting in gastric/duodenal  ulcers (however, would expect melena).     - transfused 1 unit PRBCs  - pantoprazole 40mg IV BID   - continue cipro and flagyl    - GI consulted  - clear liquid diet and NPO at midnight   - CBC q6h  - transfuse to keep Hgb >7, plts >50  - hold anticoagulants and NSAIDs   - if becomes hemodynamically unstable with associated large volume hematochezia, recommend STAT CTA and IR embolization if positive        Colitis  CT A/P w/ inflammation in distal sigmoid and rectum concerning for colitis. Given abx at OSH.     - continue cipro and flagyl   - see GI bleed      Acute blood loss anemia  Patient's anemia is currently uncontrolled. Has received 1 units of PRBCs. Etiology likely d/t acute blood loss which was from GI bleed  Current CBC reviewed-   Lab Results   Component Value Date    HGB 7.1 (L) 12/25/2023    HCT 22.8 (L) 12/25/2023     - CBC q6h  - f/u iron studies  - f/u PT/INR, PTT  - monitor serial CBC and transfuse if patient becomes hemodynamically unstable, symptomatic or H/H drops below 7/21     TALI (acute kidney injury)  Patient with acute kidney injury/acute renal failure likely due to pre-renal azotemia. TALI is currently stable. Baseline creatinine  1  - Labs reviewed- Renal function/electrolytes with Estimated Creatinine Clearance: 57.5 mL/min (based on SCr of 1.2 mg/dL). according to latest data.     - encourage PO intake  - monitor urine output and serial BMP and adjust therapy as needed  - avoid nephrotoxins and renally dose meds for GFR listed above         Chronic pain of left knee  Takes meloxicam daily for the past year for arthritis. Now presenting w/ BRBPR.     - held in setting of GI bleed       Essential hypertension  Chronic, controlled. Latest blood pressure and vitals reviewed-     Temp:  [97.7 °F (36.5 °C)-98 °F (36.7 °C)]   Pulse:  [53-82]   Resp:  [11-20]   BP: (107-124)/(66-73)   SpO2:  [99 %-100 %] .   Home meds for hypertension were reviewed and noted below.   Hypertension Medications                amLODIPine (NORVASC) 5 MG tablet TAKE 1 TABLET(5 MG) BY MOUTH EVERY DAY    lisinopriL (PRINIVIL,ZESTRIL) 20 MG tablet TAKE 1 TABLET(20 MG) BY MOUTH EVERY DAY            While in the hospital, will manage blood pressure as follows    - hold home antihypertensives in setting of GI bleed and soft BPs  - resume as necessary   - will utilize p.r.n. blood pressure medication only if patient's blood pressure greater than 180/110 and he develops symptoms such as worsening chest pain or shortness of breath      VTE Risk Mitigation (From admission, onward)           Ordered     Reason for No Pharmacological VTE Prophylaxis  Once        Question:  Reasons:  Answer:  Active Bleeding    12/25/23 1555     IP VTE HIGH RISK PATIENT  Once         12/25/23 1555     Place sequential compression device  Until discontinued         12/25/23 1555                         Kiersten Pak MD  Department of Hospital Medicine  [facility] - Telemetry Stepdown                      12/25/2023                             STAFF PHYSICIAN NOTE                                   Attending Attestation for Rounds with Resident  I have reviewed and concur with the resident's history, physical, assessment, and plan.  I have personally interviewed and examined the patient at bedside and agree with the resident's findings.              73 y.o. HTN, CKD3, presents w bright red rectal bleeding, on NSAID for OA. + hemorrhoids on rectal exam. CTA a/p - colitis and diverticulosuis. Has TALI. Hb 14-> 8.1-> 7.1.  Transfusion. GI consulted. Serial h/h, PPI.                              Colleen Mccauley MD  Senior Hospitalist  Department of [facility] Medicine  [facility] [address], [phone]

## 2023-12-26 NOTE — SUBJECTIVE & OBJECTIVE
Interval History: NAEON. Hypotensive this AM. All other VSS. No further episodes of bleeding. Reporting no pain. GI w/ plans of EGD/colonoscopy tomorrow.     Review of Systems   Constitutional:  Negative for chills and fever.   HENT:  Negative for congestion and sore throat.    Respiratory:  Negative for shortness of breath.    Cardiovascular:  Negative for chest pain and leg swelling.   Gastrointestinal:  Positive for blood in stool. Negative for abdominal pain, constipation, diarrhea, nausea and vomiting.   Genitourinary:  Negative for dysuria and frequency.   Neurological:  Positive for light-headedness. Negative for syncope, weakness and headaches.   Objective:     Vital Signs (Most Recent):  Temp: 96.3 °F (35.7 °C) (12/26/23 1121)  Pulse: 60 (12/26/23 1121)  Resp: 18 (12/26/23 1121)  BP: 117/67 (12/26/23 1121)  SpO2: (!) 94 % (12/26/23 1121) Vital Signs (24h Range):  Temp:  [96.3 °F (35.7 °C)-98.3 °F (36.8 °C)] 96.3 °F (35.7 °C)  Pulse:  [53-82] 60  Resp:  [11-18] 18  SpO2:  [94 %-100 %] 94 %  BP: ()/(54-72) 117/67     Weight: 83.9 kg (185 lb)  Body mass index is 25.8 kg/m².    Intake/Output Summary (Last 24 hours) at 12/26/2023 1225  Last data filed at 12/26/2023 0144  Gross per 24 hour   Intake 523 ml   Output --   Net 523 ml         Physical Exam   Constitutional:       General: He is not in acute distress.     Appearance: He is not ill-appearing.   HENT:      Head: Normocephalic and atraumatic.   Eyes:      Extraocular Movements: Extraocular movements intact.      Conjunctiva/sclera: Conjunctivae normal.   Cardiovascular:      Rate and Rhythm: Normal rate and regular rhythm.      Pulses: Normal pulses.   Pulmonary:      Effort: Pulmonary effort is normal.      Breath sounds: Normal breath sounds.   Abdominal:      General: Abdomen is flat. Bowel sounds are normal.      Palpations: Abdomen is soft.      Tenderness: There is no abdominal tenderness.   Genitourinary:     Rectum: External hemorrhoid  present.      Comments: GERRADO w/ no melena   Musculoskeletal:      Right lower leg: No edema.      Left lower leg: No edema.   Skin:     General: Skin is warm and dry.   Neurological:      Mental Status: He is alert and oriented to person, place, and time.        Significant Labs: All pertinent labs within the past 24 hours have been reviewed.    Significant Imaging: I have reviewed all pertinent imaging results/findings within the past 24 hours.

## 2023-12-26 NOTE — ANESTHESIA PREPROCEDURE EVALUATION
[facility]  Anesthesia Pre-Operative Evaluation         Patient Name: Jerman Miguel  YOB: 1950  MRN: [id number]    SUBJECTIVE:     Pre-operative evaluation for Procedure(s) (LRB):  EGD (ESOPHAGOGASTRODUODENOSCOPY) (N/A)  COLONOSCOPY (N/A)     12/26/2023    Jerman Miguel is a 73 y.o. male w/ a significant PMHx of arthritis, hyperlipidemia, nephrolithiasis, and HTN who is admitted for GI bleed. S/p 1 unit pRBC's.    Patient now presents for the above procedure(s).    LDA:        Peripheral IV - Single Lumen 12/25/23 0902 20 G Right Upper Arm (Active)   Site Assessment Clean;Dry;Intact;No redness;No swelling 12/26/23 0721   Extremity Assessment Distal to IV No abnormal discoloration 12/26/23 0721   Line Status Flushed 12/26/23 0721   Dressing Status Clean;Dry;Intact 12/26/23 0721   Dressing Intervention Integrity maintained 12/26/23 0721   Dressing Change Due 12/29/23 12/26/23 0721   Site Change Due 12/29/23 12/26/23 0721   Reason Not Rotated Not due 12/25/23 1415   Number of days: 1            Peripheral IV - Single Lumen 12/25/23 1301 20 G Right Wrist (Active)   Site Assessment Clean;Dry;Intact;No redness;No swelling 12/26/23 0721   Extremity Assessment Distal to IV No abnormal discoloration 12/26/23 0721   Line Status Flushed 12/26/23 0721   Dressing Status Clean;Dry;Intact 12/26/23 0721   Dressing Intervention Integrity maintained 12/26/23 0721   Dressing Change Due 12/29/23 12/26/23 0721   Site Change Due 12/29/23 12/26/23 0721   Reason Not Rotated Not due 12/26/23 0721   Number of days: 0       Prev airway: None documented.    Drips: None documented.      Patient Active Problem List   Diagnosis    Essential hypertension    Chronic pain of right ankle    Right foot pain    Decreased ROM of ankle    Dyslipidemia    Prediabetes    Ureteral stone    Diverticulosis    Acquired complex renal cyst    Chronic pain of left knee    GI bleed    TALI (acute kidney injury)    Acute  blood loss anemia    Colitis       Review of patient's allergies indicates:  No Known Allergies    Current Inpatient Medications:   ciprofloxacin HCl  500 mg Oral Q12H    metroNIDAZOLE  500 mg Oral Q8H    pantoprazole  40 mg Intravenous BID    polyethylene glycol  4,000 mL Oral Once       No current facility-administered medications on file prior to encounter.     Current Outpatient Medications on File Prior to Encounter   Medication Sig Dispense Refill    amLODIPine (NORVASC) 5 MG tablet TAKE 1 TABLET(5 MG) BY MOUTH EVERY DAY 90 tablet 2    cholecalciferol, vitamin D3, 1,000 unit capsule Take 2,000 Units by mouth once daily.      lisinopriL (PRINIVIL,ZESTRIL) 20 MG tablet TAKE 1 TABLET(20 MG) BY MOUTH EVERY DAY 90 tablet 3    meloxicam (MOBIC) 15 MG tablet TAKE 1 TABLET(15 MG) BY MOUTH DAILY WITH FOOD AS NEEDED FOR PAIN 90 tablet 0    minocycline (MINOCIN,DYNACIN) 100 MG capsule Take 1 capsule (100 mg total) by mouth 2 (two) times daily. 10 capsule 0       Past Surgical History:   Procedure Laterality Date    Right elbow  1988    SPINE SURGERY  1995    L-spine       Social History     Socioeconomic History    Marital status:     Number of children: 5   Occupational History    Occupation: Retired   Tobacco Use    Smoking status: Never    Smokeless tobacco: Never   Substance and Sexual Activity    Alcohol use: No    Drug use: No    Sexual activity: Yes     Partners: Female   Social History Narrative    No regular exercise       OBJECTIVE:     Vital Signs Range (Last 24H):  Temp:  [36.5 °C (97.7 °F)-36.8 °C (98.3 °F)]   Pulse:  [53-82]   Resp:  [11-20]   BP: ()/(54-72)   SpO2:  [96 %-100 %]       Significant Labs:  Lab Results   Component Value Date    WBC 4.93 12/26/2023    HGB 7.6 (L) 12/26/2023    HCT 23.4 (L) 12/26/2023     12/26/2023    CHOL 184 05/30/2023    TRIG 71 05/30/2023    HDL 39 (L) 05/30/2023    ALT 8 (L) 12/26/2023    AST 16 12/26/2023     12/26/2023    K 4.3 12/26/2023    CL  107 12/26/2023    CREATININE 0.9 12/26/2023    BUN 18 12/26/2023    CO2 24 12/26/2023    TSH 0.801 05/30/2023    PSA 1.2 05/30/2023    INR 1.0 12/25/2023    HGBA1C 5.2 05/30/2023       Diagnostic Studies: No relevant studies.    EKG:   Results for orders placed or performed during the hospital encounter of 09/15/21   EKG 12-lead    Collection Time: 09/15/21  4:31 PM    Narrative    Test Reason : R10.9,    Vent. Rate : 057 BPM     Atrial Rate : 057 BPM     P-R Int : 160 ms          QRS Dur : 090 ms      QT Int : 430 ms       P-R-T Axes : 040 -39 -05 degrees     QTc Int : 418 ms    Sinus bradycardia  Left axis deviation  Abnormal ECG  When compared with ECG of 01-JUN-1990 12:32,  The axis Shifted left  Non-specific change in ST segment in Lateral leads  Confirmed by Elias STEVENSON MD ([id number]) on 9/16/2021 8:13:00 AM    Referred By: [email or web address]   SELF           Confirmed By:Elias STEVENSON MD       2D ECHO:  TTE:  No results found for this or any previous visit.    JOSE MARIA:  No results found for this or any previous visit.    ASSESSMENT/PLAN:           Pre-op Assessment    I have reviewed the Patient Summary Reports.     I have reviewed the Nursing Notes.    I have reviewed the Medications.     Review of Systems  Anesthesia Hx:  No problems with previous Anesthesia   History of prior surgery of interest to airway management or planning:          Denies Family Hx of Anesthesia complications.    Denies Personal Hx of Anesthesia complications.                    Social:  Non-Smoker, No Alcohol Use       Hematology/Oncology:       -- Anemia:                                  Cardiovascular:     Hypertension    Denies CAD.        Denies CHF.    hyperlipidemia                             Pulmonary:    Denies COPD.  Denies Asthma.                    Renal/:  Chronic Renal Disease, ARF                Hepatic/GI:      Denies GERD.             Musculoskeletal:  Denies Arthritis.               Neurological:    Denies CVA.    Denies Seizures.                                 Endocrine:  Denies Diabetes.           Psych:  Denies Psychiatric History.                  Physical Exam  General: Well nourished, Cooperative, Alert and Oriented    Airway:  Mallampati: II / I  Mouth Opening: Normal  TM Distance: Normal  Tongue: Normal  Neck ROM: Normal ROM    Dental:  Dentures, Partial Dentures  Full denture on top, partial on bottom. 8 teeth remain when all taken out      Anesthesia Plan  Type of Anesthesia, risks & benefits discussed:    Anesthesia Type: Gen Natural Airway  Intra-op Monitoring Plan: Standard ASA Monitors  Post Op Pain Control Plan: multimodal analgesia and IV/PO Opioids PRN  Induction:  IV  Airway Plan: Direct and Video, Post-Induction  Informed Consent: Informed consent signed with the Patient and all parties understand the risks and agree with anesthesia plan.  All questions answered.   ASA Score: 3  Day of Surgery Review of History & Physical: H&P Update referred to the surgeon/provider.    Ready For Surgery From Anesthesia Perspective.     .

## 2023-12-26 NOTE — NURSING
Pt received 1 unit of blood which commenced by  2251 ended by 0145 without complications.     Vital signs were monitored closely and no adverse reaction were noted. Pt is AOX4 and safety measures were  put in  place.

## 2023-12-26 NOTE — ASSESSMENT & PLAN NOTE
74 yo M presenting w/ rectal bleeding that started this morning. Went to [facility], [place], operated by [facility] ED. In ED, bleeding had resolved. Hgb 8 (previously 14 in May) and [place] 7.[place] [facility] w/ no evidence of active bleeding but extensive colonic diverticulosis and inflammatory changes in distal sigmoid colon and rectum. On exam, has multiple hemorrhoids seen but no melena on GERARDO. Given PPI and abx at [facility].   DDx includes lower GI bleed 2/2 diverticular bleed (leta in setting of brisk bleed) vs colitis (seen on imaging) vs hemorrhoids (see on exam) vs upper GI bleed 2/2 NSAID use resulting in gastric/duodenal ulcers (however, would expect melena).      - transfused 1 unit PRBCs ON 12/25  - pantoprazole 40mg IV BID   - continue cipro and flagyl    - GI consulted; plans for EGD/colonoscopy 12/27  - clear liquid diet and NPO at midnight   - CBC q6h  - transfuse to keep Hgb >7, plts >50  - hold anticoagulants and NSAIDs   - if becomes hemodynamically unstable with associated large volume hematochezia, recommend STAT CTA and IR embolization if positive

## 2023-12-26 NOTE — ASSESSMENT & PLAN NOTE
Patient's anemia is currently uncontrolled. Has received 1 units of PRBCs. Etiology likely d/t acute blood loss which was from GI bleed   Current CBC reviewed-   Lab Results   Component Value Date    HGB 8.4 (L) 12/26/2023    HCT 27.1 (L) 12/26/2023     - CBC q6h  - iron studies c/w SURINDER  - PT/INR and PTT wnl  - monitor serial CBC and transfuse if patient becomes hemodynamically unstable, symptomatic or H/H drops below 7/21

## 2023-12-26 NOTE — HPI
73 years old male patient with ongoing medical history of pan colonic diverticulosis is being consulted to gastroenterology for rectal bleeding from yesterday.   The patient has the same rectal bleeding episodes many years ago, underwent Colonoscopy which was unremarkable.    The patient is being seen and examined on the bedside, alert and oriented, denied any nausea, vomiting, diarrhea and abdominal pain; however, he stated while going to the restroom he noticed bright red blood per rectum. Denied any constipation, fever, chills or any recent flue like illness. The patient went to [facility], and was transferred to our facility for possible endoscopy for GI bleed. Denied any cardiac history, no anticoagulation, however, he is taking Meloxicam for arthritis from 1 year.Past medical history is significant for Arthritis, HL;D, Nephrolithiasis and HTN. No family history of colon cancer, lung cancer, breast/ovarian cancer.    GERARDO: Normal anal sphincter tone, no hemrroirds or mass noted, no fresh red blood noted, brown color stools on examination.  CTA 12/25/2023 showed  no intraluminal intestinal, intraperitoneal, or retroperitoneal hemorrhage, Inflammatory changes involving surrounding the distal sigmoid colon and rectum, would be in keeping with a nonspecific infectious or noninfectious inflammatory colitis/proctitis.   CT abdomen and Pelvis 09/25/2021:  Circumferential rectal wall thickening and trace perirectal fluid which could represent evidence of proctitis however correlation with prior colonoscopy recommended as underlying mass not excluded. Pan colonic diverticulosis without evidence for diverticulitis.  Colonoscopy: 10 years ago at [facility]    Since admission the Hbg trend 8.0>8.5>7.6, and it was 14.7 seven months ago. HCT 24%, MCV 81, ALP 54, AST 16, ALT 8  Ferritin 12, iron 16, saturated iron 4, BUN trend 20>32>18.    Currently the patient is being treated with ciprofloxacin+flagyl, Pantoprazole  40 mg IV BID. And also being transfused with 1 pack of RBCs

## 2023-12-26 NOTE — ASSESSMENT & PLAN NOTE
CT A/P w/ inflammation in distal sigmoid and rectum concerning for colitis. Given abx at OSH.     - continue cipro and flagyl   - see GI bleed

## 2023-12-26 NOTE — ASSESSMENT & PLAN NOTE
RESOLVED  Patient with acute kidney injury/acute renal failure likely due to pre-renal azotemia due to IVVD TALI is currently stable. Baseline creatinine  1  - Labs reviewed- Renal function/electrolytes with Estimated Creatinine Clearance: 76.7 mL/min (based on SCr of 0.9 mg/dL). according to latest data.     - Encourage PO intake  - Monitor urine output and serial BMP and adjust therapy as needed  - Avoid nephrotoxins and renally dose meds for GFR listed above

## 2023-12-26 NOTE — PLAN OF CARE
Problem: Adult Inpatient Plan of Care  Goal: Plan of Care Review  Outcome: Ongoing, Progressing  Goal: Patient-Specific Goal (Individualized)  Outcome: Ongoing, Progressing  Goal: Absence of Hospital-Acquired Illness or Injury  Outcome: Ongoing, Progressing  Goal: Optimal Comfort and Wellbeing  Outcome: Ongoing, Progressing  Goal: Readiness for Transition of Care  Outcome: Ongoing, Progressing     Problem: Adjustment to Illness (Gastrointestinal Bleeding)  Goal: Optimal Coping with Acute Illness  Outcome: Ongoing, Progressing     Problem: Bleeding (Gastrointestinal Bleeding)  Goal: Hemostasis  Outcome: Ongoing, Progressing     Problem: Fluid and Electrolyte Imbalance (Acute Kidney Injury/Impairment)  Goal: Fluid and Electrolyte Balance  Outcome: Ongoing, Progressing     Problem: Oral Intake Inadequate (Acute Kidney Injury/Impairment)  Goal: Optimal Nutrition Intake  Outcome: Ongoing, Progressing     Problem: Renal Function Impairment (Acute Kidney Injury/Impairment)  Goal: Effective Renal Function  Outcome: Ongoing, Progressing     Problem: Fall Injury Risk  Goal: Absence of Fall and Fall-Related Injury  Outcome: Ongoing, Progressing   Patient AAOx4. Able to make needs known. VSS. Denies pain, nausea, vomiting, SOB. Started Golytely Prep at 17:22. NAD noted at this time. Safety checks complete. Safety measures maintained. Call light within reach. Care ongoing.

## 2023-12-26 NOTE — HOSPITAL COURSE
Transferred for concerns of GI bleed. Bleeding has since resolved. Hgb initially 8 at OSH -> 7.1. Transfused 1 unit pRBCs. Continuing Abx for possible colitis. GI consulted and taken for EGD/colonoscopy on 12/27. EGD w/ nonbleeding gastric ulcer w/ clean base (biopsied) and nonbleeding duodenal ulcer w/ clean base. Colonoscopy w/ diverticulosis and non-bleeding internal hemorrhoids. GI recommending protonix daily for 8 weeks, Anusol suppository BID for 7-10 days, repeat EGD in 8 weeks, repeat colonoscopy in 10 years, and f/u w/ CRS for hemorrhoids. Had TALI on admission which has since resolved. Medically ready for d/c. Medications ordered/reconciled. Will continue course of abx for colitis. Instructions to avoid NSAIDs d/w patient and provided - pt verbalized understanding. Outpatient referral for CRS in place.

## 2023-12-26 NOTE — ASSESSMENT & PLAN NOTE
Chronic, controlled. Latest blood pressure and vitals reviewed-     Temp:  [96.3 °F (35.7 °C)-98.3 °F (36.8 °C)]   Pulse:  [53-82]   Resp:  [11-18]   BP: ()/(54-72)   SpO2:  [94 %-100 %] .   Home meds for hypertension were reviewed and noted below.   Hypertension Medications               amLODIPine (NORVASC) 5 MG tablet TAKE 1 TABLET(5 MG) BY MOUTH EVERY DAY    lisinopriL (PRINIVIL,ZESTRIL) 20 MG tablet TAKE 1 TABLET(20 MG) BY MOUTH EVERY DAY            While in the hospital, will manage blood pressure as follows    - hold home antihypertensives in setting of GI bleed and soft BPs  - resume as necessary   - will utilize p.r.n. blood pressure medication only if patient's blood pressure greater than 180/110 and he develops symptoms such as worsening chest pain or shortness of breath

## 2023-12-27 ENCOUNTER — PATIENT MESSAGE (OUTPATIENT)
Dept: GASTROENTEROLOGY | Facility: CLINIC | Age: 73
End: 2023-12-27
Payer: MEDICARE

## 2023-12-27 ENCOUNTER — ANESTHESIA (OUTPATIENT)
Dept: ENDOSCOPY | Facility: HOSPITAL | Age: 73
End: 2023-12-27
Payer: MEDICARE

## 2023-12-27 ENCOUNTER — TELEPHONE (OUTPATIENT)
Dept: GASTROENTEROLOGY | Facility: CLINIC | Age: 73
End: 2023-12-27
Payer: MEDICARE

## 2023-12-27 VITALS
OXYGEN SATURATION: 100 % | DIASTOLIC BLOOD PRESSURE: 75 MMHG | HEIGHT: 71 IN | SYSTOLIC BLOOD PRESSURE: 130 MMHG | HEART RATE: 62 BPM | BODY MASS INDEX: 25.86 KG/M2 | RESPIRATION RATE: 19 BRPM | TEMPERATURE: 98 F | WEIGHT: 184.75 LBS

## 2023-12-27 DIAGNOSIS — D50.9 IRON DEFICIENCY ANEMIA, UNSPECIFIED IRON DEFICIENCY ANEMIA TYPE: ICD-10-CM

## 2023-12-27 DIAGNOSIS — Z51.81 ENCOUNTER FOR MONITORING LONG-TERM PROTON PUMP INHIBITOR THERAPY: ICD-10-CM

## 2023-12-27 DIAGNOSIS — K64.8 INTERNAL HEMORRHOIDS: ICD-10-CM

## 2023-12-27 DIAGNOSIS — Z79.899 ENCOUNTER FOR MONITORING LONG-TERM PROTON PUMP INHIBITOR THERAPY: ICD-10-CM

## 2023-12-27 DIAGNOSIS — K25.9 GASTRIC ULCER, UNSPECIFIED CHRONICITY, UNSPECIFIED WHETHER GASTRIC ULCER HEMORRHAGE OR PERFORATION PRESENT: Primary | ICD-10-CM

## 2023-12-27 LAB
ALBUMIN SERPL BCP-MCNC: 3 G/DL (ref 3.5–5.2)
ALP SERPL-CCNC: 56 U/L (ref 55–135)
ALT SERPL W/O P-5'-P-CCNC: 9 U/L (ref 10–44)
ANION GAP SERPL CALC-SCNC: 10 MMOL/L (ref 8–16)
AST SERPL-CCNC: 17 U/L (ref 10–40)
BASOPHILS # BLD AUTO: 0.05 K/UL (ref 0–0.2)
BASOPHILS # BLD AUTO: 0.05 K/UL (ref 0–0.2)
BASOPHILS NFR BLD: 0.7 % (ref 0–1.9)
BASOPHILS NFR BLD: 0.9 % (ref 0–1.9)
BILIRUB SERPL-MCNC: 0.4 MG/DL (ref 0.1–1)
BUN SERPL-MCNC: 12 MG/DL (ref 8–23)
CALCIUM SERPL-MCNC: 8.2 MG/DL (ref 8.7–10.5)
CHLORIDE SERPL-SCNC: 107 MMOL/L (ref 95–110)
CO2 SERPL-SCNC: 22 MMOL/L (ref 23–29)
CREAT SERPL-MCNC: 0.8 MG/DL (ref 0.5–1.4)
DIFFERENTIAL METHOD BLD: ABNORMAL
DIFFERENTIAL METHOD BLD: ABNORMAL
EOSINOPHIL # BLD AUTO: 0.1 K/UL (ref 0–0.5)
EOSINOPHIL # BLD AUTO: 0.1 K/UL (ref 0–0.5)
EOSINOPHIL NFR BLD: 1.8 % (ref 0–8)
EOSINOPHIL NFR BLD: 2.5 % (ref 0–8)
ERYTHROCYTE [DISTWIDTH] IN BLOOD BY AUTOMATED COUNT: 14.3 % (ref 11.5–14.5)
ERYTHROCYTE [DISTWIDTH] IN BLOOD BY AUTOMATED COUNT: 14.4 % (ref 11.5–14.5)
EST. GFR  (NO RACE VARIABLE): >60 ML/MIN/1.73 M^2
GLUCOSE SERPL-MCNC: 77 MG/DL (ref 70–110)
HCT VFR BLD AUTO: 24.2 % (ref 40–54)
HCT VFR BLD AUTO: 26 % (ref 40–54)
HGB BLD-MCNC: 7.9 G/DL (ref 14–18)
HGB BLD-MCNC: 8.2 G/DL (ref 14–18)
IMM GRANULOCYTES # BLD AUTO: 0.01 K/UL (ref 0–0.04)
IMM GRANULOCYTES # BLD AUTO: 0.01 K/UL (ref 0–0.04)
IMM GRANULOCYTES NFR BLD AUTO: 0.1 % (ref 0–0.5)
IMM GRANULOCYTES NFR BLD AUTO: 0.2 % (ref 0–0.5)
LYMPHOCYTES # BLD AUTO: 1.1 K/UL (ref 1–4.8)
LYMPHOCYTES # BLD AUTO: 1.2 K/UL (ref 1–4.8)
LYMPHOCYTES NFR BLD: 17.5 % (ref 18–48)
LYMPHOCYTES NFR BLD: 20.5 % (ref 18–48)
MAGNESIUM SERPL-MCNC: 1.9 MG/DL (ref 1.6–2.6)
MCH RBC QN AUTO: 25.8 PG (ref 27–31)
MCH RBC QN AUTO: 26.1 PG (ref 27–31)
MCHC RBC AUTO-ENTMCNC: 31.5 G/DL (ref 32–36)
MCHC RBC AUTO-ENTMCNC: 32.6 G/DL (ref 32–36)
MCV RBC AUTO: 79 FL (ref 82–98)
MCV RBC AUTO: 83 FL (ref 82–98)
MONOCYTES # BLD AUTO: 0.5 K/UL (ref 0.3–1)
MONOCYTES # BLD AUTO: 0.6 K/UL (ref 0.3–1)
MONOCYTES NFR BLD: 8.4 % (ref 4–15)
MONOCYTES NFR BLD: 9.9 % (ref 4–15)
NEUTROPHILS # BLD AUTO: 3.5 K/UL (ref 1.8–7.7)
NEUTROPHILS # BLD AUTO: 5 K/UL (ref 1.8–7.7)
NEUTROPHILS NFR BLD: 66 % (ref 38–73)
NEUTROPHILS NFR BLD: 71.5 % (ref 38–73)
NRBC BLD-RTO: 0 /100 WBC
NRBC BLD-RTO: 0 /100 WBC
PHOSPHATE SERPL-MCNC: 2.5 MG/DL (ref 2.7–4.5)
PLATELET # BLD AUTO: 190 K/UL (ref 150–450)
PLATELET # BLD AUTO: 222 K/UL (ref 150–450)
PMV BLD AUTO: 11.6 FL (ref 9.2–12.9)
PMV BLD AUTO: 11.8 FL (ref 9.2–12.9)
POTASSIUM SERPL-SCNC: 4 MMOL/L (ref 3.5–5.1)
PROT SERPL-MCNC: 5.5 G/DL (ref 6–8.4)
RBC # BLD AUTO: 3.06 M/UL (ref 4.6–6.2)
RBC # BLD AUTO: 3.14 M/UL (ref 4.6–6.2)
SODIUM SERPL-SCNC: 139 MMOL/L (ref 136–145)
WBC # BLD AUTO: 5.27 K/UL (ref 3.9–12.7)
WBC # BLD AUTO: 7.04 K/UL (ref 3.9–12.7)

## 2023-12-27 PROCEDURE — 37000008 HC ANESTHESIA 1ST 15 MINUTES: Performed by: INTERNAL MEDICINE

## 2023-12-27 PROCEDURE — 83735 ASSAY OF MAGNESIUM: CPT

## 2023-12-27 PROCEDURE — 80053 COMPREHEN METABOLIC PANEL: CPT

## 2023-12-27 PROCEDURE — 63600175 PHARM REV CODE 636 W HCPCS

## 2023-12-27 PROCEDURE — 88342 IMHCHEM/IMCYTCHM 1ST ANTB: CPT | Mod: 26,,, | Performed by: STUDENT IN AN ORGANIZED HEALTH CARE EDUCATION/TRAINING PROGRAM

## 2023-12-27 PROCEDURE — 43239 EGD BIOPSY SINGLE/MULTIPLE: CPT | Mod: 51,GC,, | Performed by: INTERNAL MEDICINE

## 2023-12-27 PROCEDURE — D9220A PRA ANESTHESIA: ICD-10-PCS | Mod: CRNA,,, | Performed by: NURSE ANESTHETIST, CERTIFIED REGISTERED

## 2023-12-27 PROCEDURE — 45378 DIAGNOSTIC COLONOSCOPY: CPT | Mod: GC,,, | Performed by: INTERNAL MEDICINE

## 2023-12-27 PROCEDURE — 96376 TX/PRO/DX INJ SAME DRUG ADON: CPT | Mod: 59

## 2023-12-27 PROCEDURE — 25000003 PHARM REV CODE 250

## 2023-12-27 PROCEDURE — 88305 TISSUE EXAM BY PATHOLOGIST: CPT | Mod: 26,,, | Performed by: STUDENT IN AN ORGANIZED HEALTH CARE EDUCATION/TRAINING PROGRAM

## 2023-12-27 PROCEDURE — 37000009 HC ANESTHESIA EA ADD 15 MINS: Performed by: INTERNAL MEDICINE

## 2023-12-27 PROCEDURE — 84100 ASSAY OF PHOSPHORUS: CPT

## 2023-12-27 PROCEDURE — 36415 COLL VENOUS BLD VENIPUNCTURE: CPT

## 2023-12-27 PROCEDURE — D9220A PRA ANESTHESIA: Mod: CRNA,,, | Performed by: NURSE ANESTHETIST, CERTIFIED REGISTERED

## 2023-12-27 PROCEDURE — 25000003 PHARM REV CODE 250: Performed by: NURSE ANESTHETIST, CERTIFIED REGISTERED

## 2023-12-27 PROCEDURE — 88305 TISSUE EXAM BY PATHOLOGIST: CPT | Performed by: STUDENT IN AN ORGANIZED HEALTH CARE EDUCATION/TRAINING PROGRAM

## 2023-12-27 PROCEDURE — G0378 HOSPITAL OBSERVATION PER HR: HCPCS

## 2023-12-27 PROCEDURE — 63600175 PHARM REV CODE 636 W HCPCS: Performed by: NURSE ANESTHETIST, CERTIFIED REGISTERED

## 2023-12-27 PROCEDURE — 94761 N-INVAS EAR/PLS OXIMETRY MLT: CPT

## 2023-12-27 PROCEDURE — D9220A PRA ANESTHESIA: ICD-10-PCS | Mod: ANES,,, | Performed by: ANESTHESIOLOGY

## 2023-12-27 PROCEDURE — 45378 DIAGNOSTIC COLONOSCOPY: CPT | Performed by: INTERNAL MEDICINE

## 2023-12-27 PROCEDURE — 87591 N.GONORRHOEAE DNA AMP PROB: CPT | Performed by: INTERNAL MEDICINE

## 2023-12-27 PROCEDURE — 27201012 HC FORCEPS, HOT/COLD, DISP: Performed by: INTERNAL MEDICINE

## 2023-12-27 PROCEDURE — C9113 INJ PANTOPRAZOLE SODIUM, VIA: HCPCS

## 2023-12-27 PROCEDURE — D9220A PRA ANESTHESIA: Mod: ANES,,, | Performed by: ANESTHESIOLOGY

## 2023-12-27 PROCEDURE — 43239 EGD BIOPSY SINGLE/MULTIPLE: CPT | Performed by: INTERNAL MEDICINE

## 2023-12-27 PROCEDURE — 85025 COMPLETE CBC W/AUTO DIFF WBC: CPT

## 2023-12-27 PROCEDURE — 88342 IMHCHEM/IMCYTCHM 1ST ANTB: CPT | Performed by: STUDENT IN AN ORGANIZED HEALTH CARE EDUCATION/TRAINING PROGRAM

## 2023-12-27 RX ORDER — HYDROCORTISONE ACETATE 25 MG/1
25 SUPPOSITORY RECTAL EVERY 12 HOURS PRN
Qty: 12 SUPPOSITORY | Refills: 0 | Status: SHIPPED | OUTPATIENT
Start: 2023-12-27 | End: 2023-12-27

## 2023-12-27 RX ORDER — HYDROCORTISONE ACETATE 25 MG/1
25 SUPPOSITORY RECTAL 2 TIMES DAILY
Qty: 20 SUPPOSITORY | Refills: 0 | Status: SHIPPED | OUTPATIENT
Start: 2023-12-27 | End: 2024-01-06

## 2023-12-27 RX ORDER — CIPROFLOXACIN 500 MG/1
500 TABLET ORAL EVERY 12 HOURS
Qty: 15 TABLET | Refills: 0 | Status: SHIPPED | OUTPATIENT
Start: 2023-12-27 | End: 2024-01-04

## 2023-12-27 RX ORDER — PHENYLEPHRINE HYDROCHLORIDE 10 MG/ML
INJECTION INTRAVENOUS
Status: DISCONTINUED | OUTPATIENT
Start: 2023-12-27 | End: 2023-12-27

## 2023-12-27 RX ORDER — METRONIDAZOLE 500 MG/1
500 TABLET ORAL EVERY 8 HOURS
Qty: 22 TABLET | Refills: 0 | Status: SHIPPED | OUTPATIENT
Start: 2023-12-27 | End: 2024-01-04

## 2023-12-27 RX ORDER — PANTOPRAZOLE SODIUM 40 MG/1
40 TABLET, DELAYED RELEASE ORAL DAILY
Qty: 60 TABLET | Refills: 0 | Status: SHIPPED | OUTPATIENT
Start: 2023-12-27 | End: 2024-02-25

## 2023-12-27 RX ORDER — PANTOPRAZOLE SODIUM 40 MG/1
40 TABLET, DELAYED RELEASE ORAL
Qty: 90 TABLET | Refills: 3 | Status: SHIPPED | OUTPATIENT
Start: 2023-12-27 | End: 2023-12-27

## 2023-12-27 RX ORDER — LIDOCAINE HYDROCHLORIDE 20 MG/ML
INJECTION INTRAVENOUS
Status: DISCONTINUED | OUTPATIENT
Start: 2023-12-27 | End: 2023-12-27

## 2023-12-27 RX ORDER — ONDANSETRON 2 MG/ML
4 INJECTION INTRAMUSCULAR; INTRAVENOUS ONCE AS NEEDED
Status: DISCONTINUED | OUTPATIENT
Start: 2023-12-27 | End: 2023-12-27 | Stop reason: HOSPADM

## 2023-12-27 RX ORDER — PROPOFOL 10 MG/ML
VIAL (ML) INTRAVENOUS
Status: DISCONTINUED | OUTPATIENT
Start: 2023-12-27 | End: 2023-12-27

## 2023-12-27 RX ADMIN — PROPOFOL 100 MG: 10 INJECTION, EMULSION INTRAVENOUS at 09:12

## 2023-12-27 RX ADMIN — PANTOPRAZOLE SODIUM 40 MG: 40 INJECTION, POWDER, FOR SOLUTION INTRAVENOUS at 12:12

## 2023-12-27 RX ADMIN — LIDOCAINE HYDROCHLORIDE 100 MG: 20 INJECTION INTRAVENOUS at 09:12

## 2023-12-27 RX ADMIN — METRONIDAZOLE 500 MG: 500 TABLET ORAL at 04:12

## 2023-12-27 RX ADMIN — PROPOFOL 200 MCG/KG/MIN: 10 INJECTION, EMULSION INTRAVENOUS at 09:12

## 2023-12-27 RX ADMIN — METRONIDAZOLE 500 MG: 500 TABLET ORAL at 06:12

## 2023-12-27 RX ADMIN — CIPROFLOXACIN 500 MG: 500 TABLET, FILM COATED ORAL at 08:12

## 2023-12-27 RX ADMIN — SODIUM CHLORIDE: 0.9 INJECTION, SOLUTION INTRAVENOUS at 09:12

## 2023-12-27 RX ADMIN — PHENYLEPHRINE HYDROCHLORIDE 100 MCG: 10 INJECTION INTRAVENOUS at 10:12

## 2023-12-27 NOTE — TRANSFER OF CARE
"Anesthesia Transfer of Care Note    Patient: Jerman Miguel    Procedure(s) Performed: Procedure(s) (LRB):  EGD (ESOPHAGOGASTRODUODENOSCOPY) (N/A)  COLONOSCOPY (N/A)    Patient location: [facility]    Anesthesia Type: MAC    Transport from OR: Transported from OR on room air with adequate spontaneous ventilation    Post pain: adequate analgesia    Post assessment: no apparent anesthetic complications and tolerated procedure well    Post vital signs: stable    Level of consciousness: responds to stimulation    Nausea/Vomiting: no nausea/vomiting    Complications: none    Transfer of care protocol was followed      Last vitals: Visit Vitals  /83 (BP Location: Left arm, Patient Position: Lying)   Pulse 68   Temp 36.6 °C (97.9 °F) (Temporal)   Resp 16   Ht 5' 11" (1.803 m)   Wt 83.8 kg (184 lb 11.9 oz)   SpO2 100%   BMI 25.77 kg/m²     "

## 2023-12-27 NOTE — PLAN OF CARE
CHW left a voicemail for pt's primary care office to make contact with pt to schedule his hospital f/u visit.

## 2023-12-27 NOTE — PROVATION PATIENT INSTRUCTIONS
Discharge Summary/Instructions after an Endoscopic Procedure  Patient Name: Jerman Miguel  Patient MRN: [id number]  Patient YOB: 1950 Wednesday, December 27, 2023  Jorje Miller MD  Dear patient,  As a result of recent federal legislation (The Federal Cures Act), you may   receive lab or pathology results from your procedure in your [facility]   account before your physician is able to contact you. Your physician or   their representative will relay the results to you with their   recommendations at their soonest availability.  Thank you,  RESTRICTIONS:  During your procedure today, you received medications for sedation.  These   medications may affect your judgment, balance and coordination.  Therefore,   for 24 hours, you have the following restrictions:   - DO NOT drive a car, operate machinery, make legal/financial decisions,   sign important papers or drink alcohol.    ACTIVITY:  Today: no heavy lifting, straining or running due to procedural   sedation/anesthesia.  The following day: return to full activity including work.  DIET:  Eat and drink normally unless instructed otherwise.     TREATMENT FOR COMMON SIDE EFFECTS:  - Mild abdominal pain, nausea, belching, bloating or excessive gas:  rest,   eat lightly and use a heating pad.  - Sore Throat: treat with throat lozenges and/or gargle with warm salt   water.  - Because air was used during the procedure, expelling large amounts of air   from your rectum or belching is normal.  - If a bowel prep was taken, you may not have a bowel movement for 1-3 days.    This is normal.  SYMPTOMS TO WATCH FOR AND REPORT TO YOUR PHYSICIAN:  1. Abdominal pain or bloating, other than gas cramps.  2. Chest pain.  3. Back pain.  4. Signs of infection such as: chills or fever occurring within 24 hours   after the procedure.  5. Rectal bleeding, which would show as bright red, maroon, or black stools.   (A tablespoon of blood from the rectum is not serious,  especially if   hemorrhoids are present.)  6. Vomiting.  7. Weakness or dizziness.  GO DIRECTLY TO THE NEAREST EMERGENCY ROOM IF YOU HAVE ANY OF THE FOLLOWING:      Difficulty breathing              Chills and/or fever over 101 F   Persistent vomiting and/or vomiting blood   Severe abdominal pain   Severe chest pain   Black, tarry stools   Bleeding- more than one tablespoon   Any other symptom or condition that you feel may need urgent attention  Your doctor recommends these additional instructions:  If any biopsies were taken, your doctors clinic will contact you in 1 to 2   weeks with any results.  - [facility] to [facility].   - Use hydrocortisone suppository 25 mg 1 per rectum every 12 hours for 6   days  - Repeat colonoscopy in 10 years for screening purposes.   - THIS RECOMMENDATION of 10-Years FOR NEXT SCREENING COLONOSCOPY ASSUMES   THAT YOU WILL NOT HAVE HAD OR NOT HAD A FIRST OR SECOND DEGREE RELATIVE/S   WITH COLORECTAL CANCER OR AN ADVANCE COLON ADENOMAS BEFORE THE AGE OF 60   YEARS OF AGE OF THOSE INDIVIDUALS BY THE TIME OF YOUR NEXT SCREENING   COLONOSCOPY.           - Refer to a colo-rectal surgeon in 1 month for anoscopy follow up internal   hemorrhoids.   - Return to GI clinic at the next available appointment.   - The findings and recommendations were discussed with the patient.  For questions, problems or results please call your physician - Jorje Miller MD at Work:  [phone].  [facility], EMERGENCY ROOM PHONE NUMBER: [phone]  IF A COMPLICATION OR EMERGENCY SITUATION ARISES AND YOU ARE UNABLE TO REACH   YOUR PHYSICIAN - GO DIRECTLY TO THE EMERGENCY ROOM.  Jorje Miller MD  12/27/2023 10:46:18 AM  This report has been verified and signed electronically.  Dear patient,  As a result of recent federal legislation (The Federal Cures Act), you may   receive lab or pathology results from your procedure in your [facility]   account before your  physician is able to contact you. Your physician or   their representative will relay the results to you with their   recommendations at their soonest availability.  Thank you,  PROVATION

## 2023-12-27 NOTE — PROGRESS NOTES
Pt leaving to go to EGD/Colonoscopy on a stretcher via pt transport.. no acute distress at this time

## 2023-12-27 NOTE — H&P
Short Stay Endoscopy History and Physical    PCP - WILIAN Morales MD    Procedure -  EGD & Colonoscopy  ASA - per anesthesia  Mallampati - per anesthesia  History of Anesthesia problems - no  Family history Anesthesia problems -  no   Plan of anesthesia - General    HPI:  This is a 73 y.o. male here for evaluation of : Hematochezia; CTA during this visit was unremarkable, however it did showed inflammatory changes surrounding sigmoid colon/coloitis.     Last colonoscopy 10 years ago    ROS:  Constitutional: No fevers, chills  CV: No chest pain  Pulm: No shortness of breath  GI: see HPI  Derm: No rash    Medical History:  has a past medical history of Arthritis of right foot (10/05/2016) and HTN (hypertension) (12/02/2014).    Surgical History:  has a past surgical history that includes Spine surgery (1995) and Right elbow (1988).    Family History: family history includes Asthma in his brother; Diabetes in his mother; Stomach cancer in his father.. Otherwise no colon cancer, inflammatory bowel disease, or GI malignancies.    Social History:  reports that he has never smoked. He has never used smokeless tobacco. He reports that he does not drink alcohol and does not use drugs.    Review of patient's allergies indicates:  No Known Allergies    Medications:   Medications Prior to Admission   Medication Sig Dispense Refill Last Dose    amLODIPine (NORVASC) 5 MG tablet TAKE 1 TABLET(5 MG) BY MOUTH EVERY DAY 90 tablet 2     cholecalciferol, vitamin D3, 1,000 unit capsule Take 2,000 Units by mouth once daily.       lisinopriL (PRINIVIL,ZESTRIL) 20 MG tablet TAKE 1 TABLET(20 MG) BY MOUTH EVERY DAY 90 tablet 3     meloxicam (MOBIC) 15 MG tablet TAKE 1 TABLET(15 MG) BY MOUTH DAILY WITH FOOD AS NEEDED FOR PAIN 90 tablet 0     minocycline (MINOCIN,DYNACIN) 100 MG capsule Take 1 capsule (100 mg total) by mouth 2 (two) times daily. 10 capsule 0          Physical Exam:    Vital Signs:   Vitals:    12/27/23 0804   BP: 114/65    Pulse: 72   Resp: 17   Temp: 98.1 °F (36.7 °C)       General Appearance: Well appearing in no acute distress  Eyes:    No scleral icterus  ENT: lips and tongue normal  Lungs: no use of accessory muscles  Heart:  normal rate, regular rhythm  Abdomen: Soft, non-tender, non distended   Extremities: no edema  Skin: No rash      Labs:  Lab Results   Component Value Date    WBC 5.27 12/27/2023    HGB 7.9 (L) 12/27/2023    HCT 24.2 (L) 12/27/2023     12/27/2023    CHOL 184 05/30/2023    TRIG 71 05/30/2023    HDL 39 (L) 05/30/2023    ALT 9 (L) 12/27/2023    AST 17 12/27/2023     12/27/2023    K 4.0 12/27/2023     12/27/2023    CREATININE 0.8 12/27/2023    BUN 12 12/27/2023    CO2 22 (L) 12/27/2023    TSH 0.801 05/30/2023    PSA 1.2 05/30/2023    INR 1.0 12/25/2023    HGBA1C 5.2 05/30/2023       I have explained the risks and benefits of endoscopy procedures to the patient including but not limited to bleeding, perforation, infection, and death.  The patient was asked if they understand and allowed to ask any further questions to their satisfaction.    Bebeto Martinez MD

## 2023-12-27 NOTE — PROVATION PATIENT INSTRUCTIONS
Discharge Summary/Instructions after an Endoscopic Procedure  Patient Name: Jerman Miguel  Patient MRN: [id number]  Patient YOB: 1950 Wednesday, December 27, 2023  Jorje Miller MD  Dear patient,  As a result of recent federal legislation (The Federal Cures Act), you may   receive lab or pathology results from your procedure in your [facility]   account before your physician is able to contact you. Your physician or   their representative will relay the results to you with their   recommendations at their soonest availability.  Thank you,  RESTRICTIONS:  During your procedure today, you received medications for sedation.  These   medications may affect your judgment, balance and coordination.  Therefore,   for 24 hours, you have the following restrictions:   - DO NOT drive a car, operate machinery, make legal/financial decisions,   sign important papers or drink alcohol.    ACTIVITY:  Today: no heavy lifting, straining or running due to procedural   sedation/anesthesia.  The following day: return to full activity including work.  DIET:  Eat and drink normally unless instructed otherwise.     TREATMENT FOR COMMON SIDE EFFECTS:  - Mild abdominal pain, nausea, belching, bloating or excessive gas:  rest,   eat lightly and use a heating pad.  - Sore Throat: treat with throat lozenges and/or gargle with warm salt   water.  - Because air was used during the procedure, expelling large amounts of air   from your rectum or belching is normal.  - If a bowel prep was taken, you may not have a bowel movement for 1-3 days.    This is normal.  SYMPTOMS TO WATCH FOR AND REPORT TO YOUR PHYSICIAN:  1. Abdominal pain or bloating, other than gas cramps.  2. Chest pain.  3. Back pain.  4. Signs of infection such as: chills or fever occurring within 24 hours   after the procedure.  5. Rectal bleeding, which would show as bright red, maroon, or black stools.   (A tablespoon of blood from the rectum is not serious,  especially if   hemorrhoids are present.)  6. Vomiting.  7. Weakness or dizziness.  GO DIRECTLY TO THE NEAREST EMERGENCY ROOM IF YOU HAVE ANY OF THE FOLLOWING:      Difficulty breathing              Chills and/or fever over 101 F   Persistent vomiting and/or vomiting blood   Severe abdominal pain   Severe chest pain   Black, tarry stools   Bleeding- more than one tablespoon   Any other symptom or condition that you feel may need urgent attention  Your doctor recommends these additional instructions:  If any biopsies were taken, your doctors clinic will contact you in 1 to 2   weeks with any results.  - [facility].   - Await pathology results.   - Telephone endoscopist for pathology results in 3 weeks.   - Use Protonix 40 mg once daily (or any other full strength proton pump   inhibitor) - best taken 45 minutes before your first protein containing   meal.   - Consider avoiding all non-steroidal anti-inflammatory drugs (aspirin,   ibuprofen, naproxen, etc.), unless needed for cardiovascular protection.    Recommend you discuss with your prescribing doctor (of your aspirin) to see   if cardiovascular benefits of your aspirin outweigh the risks of GI   bleeding.  - Repeat upper endoscopy in 8 weeks to check healing.   - Return to primary care physician.   - Return to GI clinic at the next available appointment.   - The findings and recommendations were discussed with the patient.  For questions, problems or results please call your physician - Jorje Miller MD at Work:  [phone].  [facility], EMERGENCY ROOM PHONE NUMBER: [phone]  IF A COMPLICATION OR EMERGENCY SITUATION ARISES AND YOU ARE UNABLE TO REACH   YOUR PHYSICIAN - GO DIRECTLY TO THE EMERGENCY ROOM.  Jorje Miller MD  12/27/2023 10:41:20 AM  This report has been verified and signed electronically.  Dear patient,  As a result of recent federal legislation (The Federal Cures Act), you may    receive lab or pathology results from your procedure in your [facility]   account before your physician is able to contact you. Your physician or   their representative will relay the results to you with their   recommendations at their soonest availability.  Thank you,  PROVATION

## 2023-12-27 NOTE — TREATMENT PLAN
Brief GI Treatment Plan      Findings on EGD today:                         - Non-bleeding duodenal ulcer with a clean ulcer                          base (Franco Class III).                          - Non-bleeding gastric ulcer with a clean ulcer                          base (Franco Class III). Biopsied.                          - Normal esophagus.                          - Z-line regular, 41 cm from the incisors.     Findings on colonoscopy today:                         - The examined portion of the ileum was normal.                          - Diverticulosis in the recto-sigmoid colon, in                          the sigmoid colon, in the descending colon, in the                          transverse colon and in the ascending colon.                          - Non-bleeding internal hemorrhoids.                          - No specimens collected.     Refer to Provation report for further details.       PLAN:  - [facility].   - Start PO Protonix 40 mg Qday for 8 weeks.  - Avoid NSAIDs  - Repeat EGD in 8 weeks to check for healing (ordered).  - Repeat colonoscopy in 10 years.   - Follow pathology results  - Anusol suppository BID for 7-10 days. Refer to colon and rectal surgery on discharge for management of hemorrhoids.     We will sign off.     Bebeto Martinez MD  PGY-V, Gastroenterology & Hepatology

## 2023-12-27 NOTE — DISCHARGE INSTRUCTIONS
Please take Protonix 40 mg daily for 8 weeks. Please use Anusol suppository twice a day for 10 days. Please avoid the use of nonsteroidal anti-inflammatory drugs (NSAIDs) which includes ibuprofen, naproxen, celecoxib, meloxicam. Take tylenol as needed for pain. You will have a repeat EGD in 8 weeks. You will need a repeat colonoscopy in 10 years. Please call colon and rectal surgery to make an appointment ([phone]) for management of hemorrhoids.

## 2023-12-27 NOTE — NURSING TRANSFER
Nursing Transfer Note      12/27/2023   1126 AM    Nurse giving handoff:CASSANDRA Morfin  Nurse receiving handoff:Kimberli    Reason patient is being transferred: Out of PACU    Transfer To: [id number]    Transfer via stretcher    Transfer with cardiac monitoring    Transported by Preeti Finley for Tele Monitor? Yes    Medicines sent: N/A    Any special needs or follow-up needed: No    Patient belongings transferred with patient: Yes    Chart send with patient: Yes    Notified: spouse Lucie    Patient reassessed at: 1124

## 2023-12-27 NOTE — PLAN OF CARE
Problem: Adult Inpatient Plan of Care  Goal: Plan of Care Review  Outcome: Ongoing, Progressing  Goal: Patient-Specific Goal (Individualized)  Outcome: Ongoing, Progressing  Goal: Absence of Hospital-Acquired Illness or Injury  Outcome: Ongoing, Progressing  Goal: Optimal Comfort and Wellbeing  Outcome: Ongoing, Progressing  Goal: Readiness for Transition of Care  Outcome: Ongoing, Progressing     Problem: Adjustment to Illness (Gastrointestinal Bleeding)  Goal: Optimal Coping with Acute Illness  Outcome: Ongoing, Progressing     Problem: Bleeding (Gastrointestinal Bleeding)  Goal: Hemostasis  Outcome: Ongoing, Progressing     Problem: Fluid and Electrolyte Imbalance (Acute Kidney Injury/Impairment)  Goal: Fluid and Electrolyte Balance  Outcome: Ongoing, Progressing     Problem: Oral Intake Inadequate (Acute Kidney Injury/Impairment)  Goal: Optimal Nutrition Intake  Outcome: Ongoing, Progressing     Problem: Renal Function Impairment (Acute Kidney Injury/Impairment)  Goal: Effective Renal Function  Outcome: Ongoing, Progressing     Problem: Fall Injury Risk  Goal: Absence of Fall and Fall-Related Injury  Outcome: Ongoing, Progressing      Topical Sulfur Applications Pregnancy And Lactation Text: This medication is considered safe during pregnancy and breast feeding secondary to limited systemic absorption.

## 2023-12-27 NOTE — DISCHARGE SUMMARY
[facility] - Telemetry [facility] Medicine  Discharge Summary      Patient Name: Jerman Miguel  MRN: [id number]  ELLA: [id number]  Patient Class: OP- Observation  Admission Date: 12/25/2023  Hospital Length of Stay: 1 days  Discharge Date and Time:  12/27/2023 2:02 PM  Attending Physician: Colleen Mccauley MD   Discharging Provider: Kiersten Pak MD  Primary Care Provider: WILIAN Morales MD  Hospital Medicine Team: [facility] MED 3 Kiersten Pak MD  Primary Care Team: [facility] MED 3    HPI:   74 yo M w/ hx of arthritis, hyperlipidemia, nephrolithiasis, and HTN who presented to [facility] ED with rectal bleeding. He reports onset of bleeding was this morning. He went to the bathroom because he noted his pants were wet and noted a large amount of blood in his pants. He reports he takes meloxicam daily for the past year for arthritis. Endorses lightheadedness/dizziness yesterday. Denies fever, chills, nausea, vomiting, hematemesis, diarrhea, constipation, melena, chest pain, and SOB. He reports he had a colonoscopy about 10 years ago which was normal. He was transferred to [facility] due to [facility] not having endoscopy services today.     In [facility] ED, bleeding had since resolved. VSS and afebrile. Initial CBC w/ no leukocytosis, Hgb 8 (lower compared to prior, 14.7 in May), Hct 25.7. Also noted to have an TALI (Cr 1.2). CT A/P w/ no evidence of active bleeding but extensive colonic diverticulosis and inflammatory changes in distal sigmoid colon and rectum. Given PPI and abx (flagyl and cipro) due to concern for colitis. Transferred to [facility]. At [facility], repeat labs notable for drop in Hgb (7.1).               Procedure(s) (LRB):  EGD (ESOPHAGOGASTRODUODENOSCOPY) (N/A)  COLONOSCOPY (N/A)      Hospital Course:   Transferred for concerns of GI bleed. Bleeding has since resolved. Hgb initially 8 at OSH -> 7.1. Transfused 1 unit pRBCs. Continuing Abx for possible colitis. GI consulted and taken for EGD/colonoscopy on  12/27. EGD w/ nonbleeding gastric ulcer w/ clean base (biopsied) and nonbleeding duodenal ulcer w/ clean base. Colonoscopy w/ diverticulosis and non-bleeding internal hemorrhoids. GI recommending protonix daily for 8 weeks, Anusol suppository BID for 7-10 days, repeat EGD in 8 weeks, repeat colonoscopy in 10 years, and f/u w/ [facility] for hemorrhoids. Had TALI on admission which has since resolved. Medically ready for d/c. Medications ordered/reconciled. Will continue course of abx for colitis. Instructions to avoid NSAIDs d/w patient and provided - pt verbalized understanding. Outpatient referral for CRS in place.          Goals of Care Treatment Preferences:  Code Status: Full Code      Consults:   Consults (From admission, onward)          Status Ordering Provider     Inpatient consult to Gastroenterology  Once        Provider:  (Not yet assigned)    Completed AMOS HUDSON            Renal/  TALI (acute kidney injury)  RESOLVED  Patient with acute kidney injury/acute renal failure likely due to pre-renal azotemia due to IVVD TALI is currently stable. Baseline creatinine  1  - Labs reviewed- Renal function/electrolytes with Estimated Creatinine Clearance: 76.7 mL/min (based on SCr of 0.9 mg/dL). according to latest data.     - encourage PO intake  - monitor urine output and serial BMP and adjust therapy as needed  - avoid nephrotoxins and renally dose meds for GFR listed above         GI  * GI bleed  72 yo M presenting w/ rectal bleeding that started this morning. Went to [facility] ED. In ED, bleeding had resolved. Hgb 8 (previously 14 in May) and repeat Hgb 7.1 at [facility]. CT A/P w/ no evidence of active bleeding but extensive colonic diverticulosis and inflammatory changes in distal sigmoid colon and rectum. On exam, has multiple hemorrhoids seen but no melena on GERARDO. Given PPI and abx at [facility].   DDx includes lower GI bleed 2/2 diverticular bleed (leta in setting of brisk bleed) vs colitis (seen on imaging) vs  hemorrhoids (see on exam) vs upper GI bleed 2/2 NSAID use resulting in gastric/duodenal ulcers (however, would expect melena).      Transfused 1 unit PRBCs on 12/25.     - GI consulted; plans for EGD/colonoscopy 12/27   - clear liquid diet and NPO at midnight   - pantoprazole 40mg IV BID   - continue cipro and flagyl    - CBC q6h  - transfuse to keep Hgb >7, plts >50  - hold anticoagulants and NSAIDs   - if becomes hemodynamically unstable with associated large volume hematochezia, recommend STAT CTA and IR embolization if positive        Colitis  CT A/P w/ inflammation in distal sigmoid and rectum concerning for colitis. Given abx at OSH.     - continue cipro and flagyl   - see GI bleed      Orthopedic  Chronic pain of left knee  Takes meloxicam daily for the past year for arthritis. Now presenting w/ BRBPR.     - held in setting of GI bleed         Final Active Diagnoses:    Diagnosis Date Noted POA    PRINCIPAL PROBLEM:  GI bleed [K92.2] 12/25/2023 Yes    TALI (acute kidney injury) [N17.9] 12/25/2023 Unknown    Acute blood loss anemia [D62] 12/25/2023 Yes    Colitis [K52.9] 12/25/2023 Unknown    Chronic pain of left knee [M25.562, G89.29] 09/22/2021 Yes    Essential hypertension [I10] 01/29/2014 Yes      Problems Resolved During this Admission:       Discharged Condition: stable    Disposition: Home or Self Care    Follow Up:   Follow-up Information       WILIAN Morales MD Follow up.    Specialty: Internal Medicine  Why: CHW left a voicemail for pt's primary care office to make contact with him to schedule his hospital f/u visit.  Contact information:  [address] [facility]  [address]  [place] [address]  [phone]               [facility] [address] [address] Schedule an appointment as soon as possible for a visit.    Specialty: Colon and Rectal Surgery  Contact information:  [address]  [facility] [address] [phone]  Additional information:  [facility] [address] [facility]    Please park in [facility] and use [facility] elevator                         Patient Instructions:      Ambulatory referral/consult to Colorectal Surgery   Standing Status: Future   Referral Priority: Routine Referral Type: Consultation   Referral Reason: Specialty Services Required   Requested Specialty: Colon and Rectal Surgery   Number of Visits Requested: 1       Significant Diagnostic Studies: Labs: All labs within the past 24 hours have been reviewed    Pending Diagnostic Studies:       Procedure Component Value Units Date/Time    C.trach/N.gonor AMP RNA [[id number]] Collected: 12/27/23 1101    Order Status: Sent Lab Status: In process Updated: 12/27/23 1101    Specimen: Rectal     Specimen to Pathology, Surgery Gastrointestinal tract [[id number]] Collected: 12/27/23 1100    Order Status: Sent Lab Status: In process Updated: 12/27/23 1111    Specimen: Tissue            Medications:  Reconciled Home Medications:      Medication List        START taking these medications      ciprofloxacin HCl 500 MG tablet  Commonly known as: CIPRO  Take 1 tablet (500 mg total) by mouth every 12 (twelve) hours. for 15 doses     hydrocortisone 25 mg suppository  Commonly known as: ANUSOL-HC  Unwrap and place 1 suppository (25 mg total) rectally 2 (two) times daily for 10 days     metroNIDAZOLE 500 MG tablet  Commonly known as: FLAGYL  Take 1 tablet (500 mg total) by mouth every 8 (eight) hours. for 22 doses     pantoprazole 40 MG tablet  Commonly known as: PROTONIX  Take 1 tablet (40 mg total) by mouth once daily.            CONTINUE taking these medications      amLODIPine 5 MG tablet  Commonly known as: NORVASC  TAKE 1 TABLET(5 MG) BY MOUTH EVERY DAY     cholecalciferol (vitamin D3) 25 mcg (1,000 unit) capsule  Commonly known as: VITAMIN D3  Take 2,000 Units by mouth once daily.     lisinopriL 20 MG tablet  Commonly known as: PRINIVIL,ZESTRIL  TAKE 1 TABLET(20 MG) BY MOUTH EVERY DAY     minocycline 100 MG capsule  Commonly  known as: MINOCIN,DYNACIN  Take 1 capsule (100 mg total) by mouth 2 (two) times daily.            STOP taking these medications      meloxicam 15 MG tablet  Commonly known as: MOBIC              Indwelling Lines/Drains at time of discharge:   Lines/Drains/Airways       None                   Time spent on the discharge of patient: 40 minutes         Kiersten Pak MD  Department of Hospital Medicine  [facility] - Telemetry Stepdown

## 2023-12-27 NOTE — PLAN OF CARE
12/27/23 1407   Final Note   Assessment Type Discharge Planning Assessment   Anticipated Discharge Disposition Home   What phone number can be called within the next 1-3 days to see how you are doing after discharge? [phone]   Hospital Resources/Appts/Education Provided Provided patient/caregiver with written discharge plan information   Post-Acute Status   Post-Acute Authorization HME   Patient choice form signed by patient/caregiver List with quality metrics by geographic area provided   Discharge Delays None known at this time     SW went to speak with pt at bedside. Pt will be getting DC today and going home with family.  No assistance needed.    Jen Gamble MSW, CSW

## 2023-12-28 LAB
C TRACH RRNA SPEC QL NAA+PROBE: NEGATIVE
N GONORRHOEA RRNA SPEC QL NAA+PROBE: NEGATIVE
N.GONORROHEAE, AMP RNA SOURCE: NORMAL
SPECIMEN SOURCE: NORMAL

## 2023-12-29 ENCOUNTER — TELEPHONE (OUTPATIENT)
Dept: ENDOSCOPY | Facility: HOSPITAL | Age: 73
End: 2023-12-29
Payer: MEDICARE

## 2023-12-29 LAB
FINAL PATHOLOGIC DIAGNOSIS: NORMAL
GROSS: NORMAL
Lab: NORMAL
MICROSCOPIC EXAM: NORMAL

## 2023-12-29 NOTE — TELEPHONE ENCOUNTER
"----- Message from Marielos Kirkland sent at 2023 11:32 AM CST -----    ----- Message -----  From: Jorje Miller MD  Sent: 2023  10:54 AM CST  To: [facility] Patients    Procedure: EGD    Diagnosis: Peptic ulcer disease (follow up healing of both gastric and duodenal ulcers)    Procedure Timin-12 weeks    #If within 4 weeks selected, please cindy as high priority#    #If greater than 12 weeks, please select "5-12 weeks" and delay sending until 3 months prior to requested date#     Provider: Myself    Location: [address]    Additional Scheduling Information: Patient should be taking Pantoprazole 40mg once daily    Prep Specifications:Standard prep    Is the patient taking a GLP-1 Agonist:no    Have you attached a patient to this message: yes       "

## 2023-12-29 NOTE — TELEPHONE ENCOUNTER
Contacted the patient to schedule an endoscopy procedure(s) 12/29/23. The patient did not answer the call and left a voice message requesting a call back.

## 2023-12-29 NOTE — ANESTHESIA POSTPROCEDURE EVALUATION
Anesthesia Post Evaluation    Patient: Jeramn Miguel    Procedure(s) Performed: Procedure(s) (LRB):  EGD (ESOPHAGOGASTRODUODENOSCOPY) (N/A)  COLONOSCOPY (N/A)    Final Anesthesia Type: general      Patient location during evaluation: PACU  Patient participation: Yes- Able to Participate  Level of consciousness: awake  Post-procedure vital signs: reviewed and stable  Pain management: adequate  Airway patency: patent    PONV status at discharge: No PONV  Anesthetic complications: no      Cardiovascular status: blood pressure returned to baseline  Respiratory status: unassisted  Hydration status: euvolemic  Follow-up not needed.              Vitals Value Taken Time   /75 12/27/23 1608   Temp 36.6 °C (97.9 °F) 12/27/23 1608   Pulse 62 12/27/23 1608   Resp 19 12/27/23 1608   SpO2 100 % 12/27/23 1608         Event Time   Out of Recovery 11:23:15         Pain/Shamika Score: Shamika Score: 10 (12/27/2023 10:45 AM)

## 2024-01-02 ENCOUNTER — TELEPHONE (OUTPATIENT)
Dept: GASTROENTEROLOGY | Facility: CLINIC | Age: 74
End: 2024-01-02
Payer: MEDICARE

## 2024-01-02 ENCOUNTER — PATIENT MESSAGE (OUTPATIENT)
Dept: GASTROENTEROLOGY | Facility: CLINIC | Age: 74
End: 2024-01-02
Payer: MEDICARE

## 2024-01-02 ENCOUNTER — TELEPHONE (OUTPATIENT)
Dept: SURGERY | Facility: CLINIC | Age: 74
End: 2024-01-02
Payer: MEDICARE

## 2024-01-02 DIAGNOSIS — B96.81 HELICOBACTER PYLORI GASTRITIS: Primary | ICD-10-CM

## 2024-01-02 DIAGNOSIS — K29.70 HELICOBACTER PYLORI GASTRITIS: Primary | ICD-10-CM

## 2024-01-02 RX ORDER — OMEPRAZOLE 20 MG/1
20 CAPSULE, DELAYED RELEASE ORAL EVERY 12 HOURS
Qty: 28 CAPSULE | Refills: 0 | Status: SHIPPED | OUTPATIENT
Start: 2024-01-02 | End: 2024-01-16

## 2024-01-02 RX ORDER — METRONIDAZOLE 250 MG/1
250 TABLET ORAL EVERY 6 HOURS
Qty: 56 TABLET | Refills: 0 | Status: SHIPPED | OUTPATIENT
Start: 2024-01-02 | End: 2024-01-16

## 2024-01-02 RX ORDER — DOXYCYCLINE HYCLATE 100 MG
100 TABLET ORAL EVERY 12 HOURS
Qty: 28 TABLET | Refills: 0 | Status: SHIPPED | OUTPATIENT
Start: 2024-01-02 | End: 2024-01-16

## 2024-01-02 NOTE — TELEPHONE ENCOUNTER
Called pt's wife back to get him set up with an appt with Dr. Hill.  Referral in chart.  Next avail 1/16 at 2:00pm at [place].  RN left a vmail for pt's wife to call back if they are available this date.

## 2024-01-02 NOTE — TELEPHONE ENCOUNTER
Spoke with patient. Informed of results regarding H Pylori. He will get RX from pharmacy.  Also, per Dr Miller's prior message, Mr Miguel was scheduled for an appointment in CRS and lab work.

## 2024-01-02 NOTE — TELEPHONE ENCOUNTER
----- Message from Jorje Miller MD sent at 1/2/2024  9:57 AM CST -----  GI MA team- Please tell patient that their EGD pathology is positive for and H. Pylori infection and recommend Quadruple therapy treatment for H. Pylori with:    Omeprazole 20mg 12 hours for 14 days  Doxycycline 100mg every 12 hours for 14 days  Metronidazole 250mg every 6 hours for 14 days  Bismuth subsalicylate 524mg every 6 hours for 14 days.     Don't drink alcohol on these medicines. Take after meals and drink plenty of water to make sure all pills clear esophagus.      Please mail patient UpToDate patient information on H. Pylori and have patient read this prior to beginning treatment.    GI MA team - please order stool for H. Pylori Antigen in 12 weeks.    Please tell patient to check for H. Pylori eradiation we would like to check stool sample in 12 weeks which tells us if H. Pylori has been successfully eradicated with the prior treatment medications.      H. Pylori antigen test which tells us if H. Pylori has been successfully eradicated with the prior treatment medications, and please tell patient that Stool H. Pylori antigen needs to be done off the following medications for the following amount of time:    1. Off all Antibiotics for 4 (Four) weeks before stool collection.      2. Off all Proton Pump Inhibitors medications for 2 (Two) weeks before collecting stool for H. Pylori Antigen:  :  Nexium (esomeprazole)  Prilosec (omeprazole)   Protonix (pantoprazole)  Prevacid (lansoprazole)  Aciphex (rabeprazole)  Dexilant (dexlansoprazole)    Zegerid     3. Off all H2 blockers medications for 2 (Two) weeks before collecting stool for H. Pylori Antigen:    Zantac (ranitidine)  Tagamet (cimetadine)  Axid (nizatidine)   Pepcid (famotidine)    4. Off Pepto-Bismol for 4 (four) weeks prior to collecting stool for H. Pylori Antigen.      Stomach, biopsy:  - Helicobacter pylori-associated chronic active gastritis with reactive/ regenerative  changes  - Negative for intestinal metaplasia or dysplasia   Comment: Interp By Brayden Jalloh M.D., Signed on 12/29/2023

## 2024-01-02 NOTE — PROGRESS NOTES
GI MA team- Please tell patient that their EGD pathology is positive for and H. Pylori infection and recommend Quadruple therapy treatment for H. Pylori with:    Omeprazole 20mg 12 hours for 14 days  Doxycycline 100mg every 12 hours for 14 days  Metronidazole 250mg every 6 hours for 14 days  Bismuth subsalicylate 524mg every 6 hours for 14 days.     Don't drink alcohol on these medicines. Take after meals and drink plenty of water to make sure all pills clear esophagus.      Please mail patient UpToDate patient information on H. Pylori and have patient read this prior to beginning treatment.    GI MA team - please order stool for H. Pylori Antigen in 12 weeks.    Please tell patient to check for H. Pylori eradiation we would like to check stool sample in 12 weeks which tells us if H. Pylori has been successfully eradicated with the prior treatment medications.      H. Pylori antigen test which tells us if H. Pylori has been successfully eradicated with the prior treatment medications, and please tell patient that Stool H. Pylori antigen needs to be done off the following medications for the following amount of time:    1. Off all Antibiotics for 4 (Four) weeks before stool collection.      2. Off all Proton Pump Inhibitors medications for 2 (Two) weeks before collecting stool for H. Pylori Antigen:  :  Nexium (esomeprazole)  Prilosec (omeprazole)   Protonix (pantoprazole)  Prevacid (lansoprazole)  Aciphex (rabeprazole)  Dexilant (dexlansoprazole)    Zegerid     3. Off all H2 blockers medications for 2 (Two) weeks before collecting stool for H. Pylori Antigen:    Zantac (ranitidine)  Tagamet (cimetadine)  Axid (nizatidine)   Pepcid (famotidine)    4. Off Pepto-Bismol for 4 (four) weeks prior to collecting stool for H. Pylori Antigen.      Stomach, biopsy:  - Helicobacter pylori-associated chronic active gastritis with reactive/ regenerative changes  - Negative for intestinal metaplasia or dysplasia   Comment: Interp  By Brayden Jalloh M.D., Signed on 12/29/2023

## 2024-01-04 ENCOUNTER — TELEPHONE (OUTPATIENT)
Dept: ENDOSCOPY | Facility: HOSPITAL | Age: 74
End: 2024-01-04
Payer: MEDICARE

## 2024-01-04 VITALS — BODY MASS INDEX: 25.9 KG/M2 | HEIGHT: 71 IN | WEIGHT: 185 LBS

## 2024-01-04 DIAGNOSIS — K27.9 PEPTIC ULCER DISEASE: Primary | ICD-10-CM

## 2024-01-04 NOTE — TELEPHONE ENCOUNTER
Spoke to [facility] to schedule procedure(s) Upper Endoscopy (EGD)       Physician to perform procedure(s) Dr. EMANUEL Solis  Date of Procedure (s) 03/26/24  Arrival Time 3:30 PM  Time of Procedure(s) 4:30 PM   Location of Procedure(s) [address]  Type of Rx Prep sent to patient: Other  Instructions provided to patient via [facility]    Patient was informed on the following information and verbalized understanding. Screening questionnaire reviewed with patient and complete. If procedure requires anesthesia, a responsible adult needs to be present to accompany the patient home, patient cannot drive after receiving anesthesia. Appointment details are tentative, especially check-in time. Patient will receive a prep-op call 7 days prior to confirm check-in time for procedure. If applicable the patient should contact their pharmacy to verify Rx for procedure prep is ready for pick-up. Patient was advised to call the scheduling department at [phone] if pharmacy states no Rx is available. Patient was advised to call the endoscopy scheduling department if any questions or concerns arise.       Endoscopy Scheduling Department

## 2024-01-04 NOTE — TELEPHONE ENCOUNTER
"----- Message from Marielos Krikland sent at 1/3/2024  9:21 AM CST -----    ----- Message -----  From: Jorje Miller MD  Sent: 2024  10:01 AM CST  To: [facility] Patients    Procedure: EGD    Diagnosis: Peptic ulcer disease healing follow-up history of H pylori    Procedure Timin-12 weeks    #If within 4 weeks selected, please cindy as high priority#    #If greater than 12 weeks, please select "5-12 weeks" and delay sending until 3 months prior to requested date#     Provider: Any endoscopist    Location: No Preference    Additional Scheduling Information: No scheduling concerns    Prep Specifications:Standard prep    Is the patient taking a GLP-1 Agonist:no    Have you attached a patient to this message: yes       "

## 2024-01-09 ENCOUNTER — TELEPHONE (OUTPATIENT)
Dept: ENDOSCOPY | Facility: HOSPITAL | Age: 74
End: 2024-01-09
Payer: MEDICARE

## 2024-01-09 VITALS — WEIGHT: 185 LBS | BODY MASS INDEX: 25.9 KG/M2 | HEIGHT: 71 IN

## 2024-01-09 NOTE — TELEPHONE ENCOUNTER
"----- Message from Marielos Kirkland sent at 1/3/2024  9:20 AM CST -----  Regarding: FW: Repeat EGD    ----- Message -----  From: Bebeto Martinez MD  Sent: 1/3/2024  12:00 AM CST  To: [facility]osc[facility] Patients  Subject: Repeat EGD                                       Procedure: EGD    Diagnosis: Peptic ulcer disease    Procedure Timin weeks    #If within 4 weeks selected, please cindy as high priority#    #If greater than 12 weeks, please select "5-12 weeks" and delay sending until 3 months prior to requested date#     Provider: Any GI provider    Location: [address]    Additional Scheduling Information: Cirrhosis    Prep Specifications:N/A    Is the patient taking a GLP-1 Agonist:no    Have you attached a patient to this message: yes       "

## 2024-01-09 NOTE — TELEPHONE ENCOUNTER
"----- Message from Marielos Kirkland sent at 1/3/2024  9:20 AM CST -----  Regarding: FW: Repeat EGD    ----- Message -----  From: Bebeto Martinez MD  Sent: 1/3/2024  12:00 AM CST  To: [facility] Patients  Subject: Repeat EGD                                       Procedure: EGD    Diagnosis: Peptic ulcer disease    Procedure Timin weeks    #If within 4 weeks selected, please cindy as high priority#    #If greater than 12 weeks, please select "5-12 weeks" and delay sending until 3 months prior to requested date#     Provider: Any GI provider    Location: [address]    Additional Scheduling Information: Cirrhosis    Prep Specifications:N/A    Is the patient taking a GLP-1 Agonist:no    Have you attached a patient to this message: yes       "

## 2024-02-09 ENCOUNTER — PATIENT MESSAGE (OUTPATIENT)
Dept: GASTROENTEROLOGY | Facility: CLINIC | Age: 74
End: 2024-02-09
Payer: MEDICARE

## 2024-02-09 ENCOUNTER — OFFICE VISIT (OUTPATIENT)
Dept: SURGERY | Facility: CLINIC | Age: 74
End: 2024-02-09
Payer: MEDICARE

## 2024-02-09 VITALS
BODY MASS INDEX: 25.67 KG/M2 | HEART RATE: 76 BPM | DIASTOLIC BLOOD PRESSURE: 77 MMHG | SYSTOLIC BLOOD PRESSURE: 120 MMHG | WEIGHT: 184.06 LBS

## 2024-02-09 DIAGNOSIS — K64.8 INTERNAL HEMORRHOIDS: ICD-10-CM

## 2024-02-09 DIAGNOSIS — D50.9 IRON DEFICIENCY ANEMIA, UNSPECIFIED IRON DEFICIENCY ANEMIA TYPE: ICD-10-CM

## 2024-02-09 PROCEDURE — 99203 OFFICE O/P NEW LOW 30 MIN: CPT | Mod: 25,S$GLB,, | Performed by: STUDENT IN AN ORGANIZED HEALTH CARE EDUCATION/TRAINING PROGRAM

## 2024-02-09 PROCEDURE — 99999 PR PBB SHADOW E&M-EST. PATIENT-LVL III: CPT | Mod: PBBFAC,,, | Performed by: STUDENT IN AN ORGANIZED HEALTH CARE EDUCATION/TRAINING PROGRAM

## 2024-02-09 PROCEDURE — 46600 DIAGNOSTIC ANOSCOPY SPX: CPT | Mod: S$GLB,,, | Performed by: STUDENT IN AN ORGANIZED HEALTH CARE EDUCATION/TRAINING PROGRAM

## 2024-02-09 NOTE — PROGRESS NOTES
[facility]  Colon and Rectal Surgery    [address]  [place], [place]  Tel: [phone]  Fax: [phone]  [email or web address]   MD Donaldo Taveras MD Brian Kann, MD W. Forrest Johnston, MD Matthew Giglia, MD Jennifer Paruch, MD William Kethman, MD Danielle Kay, MD     Patient name: Jerman Miguel   YOB: 1950   MRN: [id number]    Dear Dr. Miller,    It was a pleasure seeing Mr. Miguel in the Colon and Rectal Surgery clinic here at [facility].     As you know, Mr. Miguel is a 73 year old man with a history of HTN, HLD, and preDM  who presents for evaluation of hemorrhoidal disease and rectal bleeding. He was recently admitted to [facility] and transferred to [facility] for management of a gastrointestinal bleeding - he underwent a colonoscopy and EGD (see below). He did bleed briefly in early January but this has since resolved. He has never had issues with hemorrhoids in the past. He denies any lightheadedness, blood in his stool, change in bowel habits, straining with bowel movements, or prolonged time on the toilet. He did stop Meloxicam and he is still on antibiotics for H. Pylori that was diagnosed at the time of his EGD. He is not on blood thinners.    Last colonoscopy: 12/27/2023 (Complete)  The terminal ileum appeared normal.   Multiple small and large-mouthed diverticula were found in the recto-sigmoid colon, sigmoid colon, descending colon, transverse colon and ascending colon.   The perianal and digital rectal examinations were normal.   Non-bleeding internal hemorrhoids were found during retroflexion.   The hemorrhoids were moderate.     EGD - 12/27/2023  One non-bleeding cratered duodenal ulcer with a clean ulcer base (Franco Class III) was found in the duodenal bulb. The lesion was 5 mm in largest dimension.   One non-bleeding cratered gastric ulcer with a clean ulcer base (Franco Class III) was found in the  prepyloric region of the stomach. The lesion was 5 mm in largest dimension. Biopsies were taken with a cold forceps for histology. Biopsies were taken with a cold forceps for Helicobacter pylori testing. Biopsies were taken with a cold forceps for histology. Estimated blood loss was minimal. The examined esophagus was normal. Z-line was sharp. No esophagitis and no columnar esophagus and no lesions seen with NBI or white light.     Stomach, biopsy:   - Helicobacter pylori-associated chronic active gastritis with reactive/ regenerative changes   - Negative for intestinal metaplasia or dysplasia     Rectal GC neg    CT AP - 12/25/2023  1. No intraluminal intestinal, intraperitoneal, or retroperitoneal hemorrhage.  2. Inflammatory changes involving surrounding the distal sigmoid colon and rectum, would be in keeping with a nonspecific infectious or noninfectious inflammatory colitis/proctitis.  3. Redemonstrated complex left renal cyst, for which further characterization with dedicated renal mass protocol MRI or CT versus ultrasound would be recommended.  4. Additional details of chronic and incidental findings, as provided in the body of the report.    The patient was informed of the availability of a certified  without charge. A certified  was not necessary for this visit.    Review of Systems  See pertinent review of systems above    Past Medical History:   Diagnosis Date    Arthritis of right foot 10/05/2016    HTN (hypertension) 12/02/2014 2014     Past Surgical History:   Procedure Laterality Date    COLONOSCOPY  2014    COLONOSCOPY N/A 12/27/2023    Procedure: COLONOSCOPY;  Surgeon: Jorje Miller MD;  Location: [facility] ([address] [place] [address]);  Service: Endoscopy;  Laterality: N/A;    ESOPHAGOGASTRODUODENOSCOPY N/A 12/27/2023    Procedure: EGD (ESOPHAGOGASTRODUODENOSCOPY);  Surgeon: Jorje Miller MD;  Location: [facility] ([address] [place] [address]);  Service: Endoscopy;  Laterality: N/A;    Right  elbow  1988    SPINE SURGERY  1995    L-spine     Family History   Problem Relation Age of Onset    Diabetes Mother     Stomach cancer Father     Asthma Brother      Social History     Tobacco Use    Smoking status: Never    Smokeless tobacco: Never   Substance Use Topics    Alcohol use: No    Drug use: No     Review of patient's allergies indicates:  No Known Allergies    Current Outpatient Medications on File Prior to Visit   Medication Sig Dispense Refill    amLODIPine (NORVASC) 5 MG tablet TAKE 1 TABLET(5 MG) BY MOUTH EVERY DAY 90 tablet 2    cholecalciferol, vitamin D3, 1,000 unit capsule Take 2,000 Units by mouth once daily.      lisinopriL (PRINIVIL,ZESTRIL) 20 MG tablet TAKE 1 TABLET(20 MG) BY MOUTH EVERY DAY 90 tablet 3    minocycline (MINOCIN,DYNACIN) 100 MG capsule Take 1 capsule (100 mg total) by mouth 2 (two) times daily. 10 capsule 0    omeprazole (PRILOSEC) 20 MG capsule Take 1 capsule (20 mg total) by mouth every 12 (twelve) hours. H pylori treatment for 14 days 28 capsule 0    pantoprazole (PROTONIX) 40 MG tablet Take 1 tablet (40 mg total) by mouth once daily. 60 tablet 0     No current facility-administered medications on file prior to visit.     Physical Examination  /77   Pulse 76   Wt 83.5 kg (184 lb 1.4 oz)   BMI 25.67 kg/m²      A chaperone was present for the physical examination.    Constitutional: well developed, no cough, no dyspnea, alert, and no acute distress    Head: Normocephalic, no lesions, without obvious abnormality  Eye: Normal external eye, conjunctiva, and lids  Cardiovascular: regular rate and regular rhythm  Respiratory: normal air entry  Gastrointestinal: soft, non-tender    Perianal Skin: Normal  Digital Rectal Exam:  Normal resting tone  Normal squeeze pressure   Relaxation with bear down is present  Mass(es) appreciated: none    Musculoskeletal: full range of motion without pain  Neurologic: alert, oriented, normal speech, no focal findings or movement disorder  noted  Psychiatric: appropriate, normal mood    Anoscopy Procedure Note  Pre-procedure diagnosis: Rectal bleeding  Post-procedure diagnosis: See findings    Procedure: Anoscopy    Surgeon: Young Hill MD    Specimen: None    Findings: Mildly prominent non-bleeding internal hemorrhoids    Procedure Description:   A digital rectal exam was first performed and then a lubricated lighted anoscope was then inserted and a 4 quadrant evaluation was performed. The patient tolerated procedure well without complications.    Assessment and Plan of Care    Thank you again for referring Mr. Miguel to my care. In summary, Mr. Miguel is a 73 year old man presenting with a recent GI bleed - he does have internal hemorrhoids but these were non-bleeding on his exam, he was found to have a gastric and duodenal ulcer which may have been the source. We discussed treatment options and have provided the following recommendations:    We had a discussion about conservative management options for symptoms related to hemorrhoids. Irritation or itching can be treated with Lidocaine cream/ointment, over-the-counter Hydrocortisone suppositories or ointments, and warm baths or soaking. Of note hydrocortisone, should not be used for longer than a week due to side effects. Bleeding often improves by reducing hard stools and straining - Fiber supplementation (Metamucil 20-30 grams daily) and hydration (1.5 - 2 L daily) are effective. If you suffer from constipation, Miralax can be taken as a stool softener. Regular exercise, avoiding constipating medications (narcotic pain medications), limiting alcohol and fatty foods, reduced time on the toilet and focusing on not straining can also be effective.  Follow-up with Dr. Miller for management of gastroduodenal ulcers in the setting of H. pylori - will send message to be scheduled  Follow-up with me as needed    Please do not hesitate to contact me if you have any questions.      Young  MD Liz, FACS, FASCRS  Department of Colon & Rectal Surgery  [facility]

## 2024-02-15 RX ORDER — LISINOPRIL 20 MG/1
TABLET ORAL
Qty: 90 TABLET | Refills: 3 | Status: SHIPPED | OUTPATIENT
Start: 2024-02-15

## 2024-03-01 ENCOUNTER — OFFICE VISIT (OUTPATIENT)
Dept: URGENT CARE | Facility: CLINIC | Age: 74
End: 2024-03-01
Payer: MEDICARE

## 2024-03-01 VITALS
DIASTOLIC BLOOD PRESSURE: 80 MMHG | SYSTOLIC BLOOD PRESSURE: 108 MMHG | HEART RATE: 98 BPM | OXYGEN SATURATION: 96 % | BODY MASS INDEX: 25.76 KG/M2 | WEIGHT: 184 LBS | HEIGHT: 71 IN | TEMPERATURE: 98 F | RESPIRATION RATE: 18 BRPM

## 2024-03-01 DIAGNOSIS — L29.9 PRURITUS: Primary | ICD-10-CM

## 2024-03-01 PROCEDURE — 99213 OFFICE O/P EST LOW 20 MIN: CPT | Mod: S$GLB,,, | Performed by: NURSE PRACTITIONER

## 2024-03-01 RX ORDER — HYDROXYZINE PAMOATE 25 MG/1
25 CAPSULE ORAL EVERY 8 HOURS PRN
Qty: 30 CAPSULE | Refills: 0 | Status: SHIPPED | OUTPATIENT
Start: 2024-03-01

## 2024-03-01 NOTE — PATIENT INSTRUCTIONS
Avoid foods that may make your skin itch.  Take extra care when you take a bath or shower.  Use lukewarm water and limit bath or shower time to 5 to 10 minutes.  Use only mild and unscented soaps and bath products.  Apply products that moisturize your skin.  Stay away from things that bother your skin. These may include household , detergents, aftershave lotions, gasoline, and other liquids.  Avoid clothes that fit too tight or are made of wool or synthetic fabrics.  Do not scratch or rub the skin. Try to be gentle and pat or tap the skin instead.  Keep your fingernails trimmed and short to avoid damage to the skin.  Use a cool compress on the skin. Dip a cloth in cold water and apply it directly to your skin. This may help it to not swell and itch.  Avoid heat and humidity for long periods.

## 2024-03-01 NOTE — PROGRESS NOTES
"Subjective:      Patient ID: Jerman Miguel is a 73 y.o. male.    Vitals:  height is 5' 11" (1.803 m) and weight is 83.5 kg (184 lb). His oral temperature is 98.2 °F (36.8 °C). His blood pressure is 108/80 and his pulse is 98. His respiration is 18 and oxygen saturation is 96%.     Chief Complaint: Rash    Pt. Presents c.o. rash. Symps include redness and itchiness on arms, legs and chest, patient is also experiencing facial swelling.Symps began 1 hour ago.    Rash  The current episode started today. The problem is unchanged. The affected locations include the abdomen, left lower leg, left upper leg, right lower leg and right upper leg. The rash is characterized by redness and swelling. It is unknown if there was an exposure to a precipitant. Pertinent negatives include no anorexia, congestion, cough, diarrhea, eye pain, facial edema, fatigue, fever, joint pain, nail changes, rhinorrhea, shortness of breath, sore throat or vomiting. Treatments tried: benedril. The treatment provided mild relief. His past medical history is significant for varicella. There is no history of allergies, asthma or eczema.       Constitution: Negative. Negative for fatigue and fever.   HENT:  Negative for congestion and sore throat.    Cardiovascular: Negative.    Eyes:  Negative for eye pain.   Respiratory:  Negative for cough and shortness of breath.    Gastrointestinal:  Negative for vomiting and diarrhea.   Skin:  Positive for rash. Negative for erythema.      Objective:     Physical Exam   HENT:   Head: Normocephalic.   Nose: Nose normal.   Mouth/Throat: Mucous membranes are moist. Oropharynx is clear.   Cardiovascular: Normal rate and regular rhythm.   Pulmonary/Chest: Effort normal and breath sounds normal.   Abdominal: Normal appearance.   Neurological: He is alert.   Skin: Skin is warm, dry and no rash. No bruising and No erythema       Assessment:     1. Pruritus        Plan:   Mr. Miguel presents for itching. States " after being around his daughter's cats he began itching. Took a benadry which gave some relief. Denies any SOB, difficulty swallowing or chest tightness. Exam: negative. No rashes, no hives, no lesions.    Pruritus  -     hydrOXYzine pamoate (VISTARIL) 25 MG Cap; Take 1 capsule (25 mg total) by mouth every 8 (eight) hours as needed (itching).  Dispense: 30 capsule; Refill: 0      Patient Instructions   Avoid foods that may make your skin itch.  Take extra care when you take a bath or shower.  Use lukewarm water and limit bath or shower time to 5 to 10 minutes.  Use only mild and unscented soaps and bath products.  Apply products that moisturize your skin.  Stay away from things that bother your skin. These may include household , detergents, aftershave lotions, gasoline, and other liquids.  Avoid clothes that fit too tight or are made of wool or synthetic fabrics.  Do not scratch or rub the skin. Try to be gentle and pat or tap the skin instead.  Keep your fingernails trimmed and short to avoid damage to the skin.  Use a cool compress on the skin. Dip a cloth in cold water and apply it directly to your skin. This may help it to not swell and itch.  Avoid heat and humidity for long periods.

## 2024-03-18 ENCOUNTER — OFFICE VISIT (OUTPATIENT)
Dept: INTERNAL MEDICINE | Facility: CLINIC | Age: 74
End: 2024-03-18
Attending: INTERNAL MEDICINE
Payer: MEDICARE

## 2024-03-18 VITALS
WEIGHT: 184 LBS | HEIGHT: 71 IN | DIASTOLIC BLOOD PRESSURE: 80 MMHG | SYSTOLIC BLOOD PRESSURE: 128 MMHG | OXYGEN SATURATION: 98 % | HEART RATE: 69 BPM | BODY MASS INDEX: 25.76 KG/M2

## 2024-03-18 DIAGNOSIS — M25.562 CHRONIC PAIN OF LEFT KNEE: ICD-10-CM

## 2024-03-18 DIAGNOSIS — E78.5 DYSLIPIDEMIA: ICD-10-CM

## 2024-03-18 DIAGNOSIS — Z13.31 SCREENING FOR DEPRESSION: ICD-10-CM

## 2024-03-18 DIAGNOSIS — B96.81 HELICOBACTER PYLORI GASTRITIS: ICD-10-CM

## 2024-03-18 DIAGNOSIS — Z13.39 SCREENING FOR ALCOHOLISM: ICD-10-CM

## 2024-03-18 DIAGNOSIS — Z01.818 PRE-OP EVALUATION: ICD-10-CM

## 2024-03-18 DIAGNOSIS — I10 ESSENTIAL HYPERTENSION: Primary | ICD-10-CM

## 2024-03-18 DIAGNOSIS — G89.29 CHRONIC PAIN OF LEFT KNEE: ICD-10-CM

## 2024-03-18 DIAGNOSIS — K26.9 DUODENAL ULCER: ICD-10-CM

## 2024-03-18 DIAGNOSIS — K29.70 HELICOBACTER PYLORI GASTRITIS: ICD-10-CM

## 2024-03-18 DIAGNOSIS — D50.9 IRON DEFICIENCY ANEMIA, UNSPECIFIED IRON DEFICIENCY ANEMIA TYPE: ICD-10-CM

## 2024-03-18 DIAGNOSIS — I70.0 AORTIC ATHEROSCLEROSIS: ICD-10-CM

## 2024-03-18 PROCEDURE — 99215 OFFICE O/P EST HI 40 MIN: CPT | Mod: S$GLB,,, | Performed by: INTERNAL MEDICINE

## 2024-03-18 PROCEDURE — G0442 ANNUAL ALCOHOL SCREEN 15 MIN: HCPCS | Mod: 59,S$GLB,, | Performed by: INTERNAL MEDICINE

## 2024-03-18 PROCEDURE — G0444 DEPRESSION SCREEN ANNUAL: HCPCS | Mod: 59,S$GLB,, | Performed by: INTERNAL MEDICINE

## 2024-03-18 RX ORDER — FERROUS SULFATE 325(65) MG
325 TABLET ORAL 2 TIMES DAILY
COMMUNITY

## 2024-03-18 RX ORDER — OMEPRAZOLE 20 MG/1
CAPSULE, DELAYED RELEASE ORAL
Qty: 14 CAPSULE | Refills: 0 | Status: ON HOLD
Start: 2024-03-18 | End: 2024-03-26 | Stop reason: HOSPADM

## 2024-03-18 NOTE — PROGRESS NOTES
Subjective     Patient ID: Jerman Miguel is a 73 y.o. male.    Chief Complaint: Pre-op Exam (L toal knee replacement with Dr Hussain Wynne Jr 4/2/24)    He is scheduled for left TKA by Dr. Wynne on April 2, 2024.    He had an EGD for a bleeding duodenal ulcer near the end of December 2023.  It was H pylori positive.  He took antibiotics.  His hemoglobin was 7.9.    Recent pre-op hemoglobin was 9.6.        Hypertension  This is a chronic problem. The current episode started more than 1 year ago. The problem is controlled.             Review of Systems   Constitutional: Negative.    Respiratory: Negative.     Cardiovascular: Negative.    Gastrointestinal:  Negative for blood in stool.   Musculoskeletal:  Positive for arthralgias.          Objective     Physical Exam  Vitals and nursing note reviewed.   Constitutional:       Appearance: He is well-developed.   HENT:      Head: Normocephalic and atraumatic.   Eyes:      Pupils: Pupils are equal, round, and reactive to light.   Cardiovascular:      Rate and Rhythm: Normal rate and regular rhythm.      Heart sounds: Normal heart sounds.   Pulmonary:      Effort: Pulmonary effort is normal.   Neurological:      Mental Status: He is alert.            Assessment and Plan     1. Essential hypertension  Overview:  2014      2. Dyslipidemia    3. Chronic pain of left knee    4. Helicobacter pylori gastritis  -     omeprazole (PRILOSEC) 20 MG capsule; Take 1 capsule daily  Dispense: 14 capsule; Refill: 0    5. Duodenal ulcer  Overview:  EGD 12/23 - H. pylori pos      6. Aortic atherosclerosis    7. Iron deficiency anemia, unspecified iron deficiency anemia type    8. Pre-op evaluation    9. Screening for alcoholism    10. Screening for depression        PLAN:  Per orders and D/C instructions.  Continue diet and/or meds for HTN, aortic atherosclerosis, and high cholesterol, which are stable.  He has mild anemia which has improved since December 2023.  No sign of active  bleeding.  Start iron sulfate 325 mg and continue postop.  He was instructed to take amlodipine and omeprazole on the morning of surgery, but not to take lisinopril.  See preop letter of medical clearance.    Screening: The patient was screened for depression with the PHQ2 questionnaire and possible health consequences were discussed with the patient, who understands (15 minutes spent).       The patient was screened for the misuse of alcohol, by asking the number of drinks per average week, and if pt has had more than 4 drinks (more than 3 for women and elderly) in 1 day within the past year. The health and legal consequences of misuse were discussed (15 minutes spent).        Follow up in 3 months (on 6/21/2024).

## 2024-03-18 NOTE — PATIENT INSTRUCTIONS
Take omeprazole and amlodipine on the morning of surgery with sips of water.  Do not take the lisinopril.

## 2024-03-25 ENCOUNTER — TELEPHONE (OUTPATIENT)
Dept: GASTROENTEROLOGY | Facility: CLINIC | Age: 74
End: 2024-03-25
Payer: MEDICARE

## 2024-03-25 PROBLEM — N17.9 AKI (ACUTE KIDNEY INJURY): Status: RESOLVED | Noted: 2023-12-25 | Resolved: 2024-03-25

## 2024-03-25 PROBLEM — K92.2 GI BLEED: Status: RESOLVED | Noted: 2023-12-25 | Resolved: 2024-03-25

## 2024-03-25 NOTE — TELEPHONE ENCOUNTER
----- Message from Marie Hicks sent at 3/25/2024  3:15 PM CDT -----  Regarding: Procedure  Contact: [phone]  Type:  Needs Medical Advice    Who Called: Farzana  Would the patient rather a call back or a response via [facility]? Call  Best Call Back Number: [phone]  Additional Information: Would like to confirm arrival time for tomorrow

## 2024-03-25 NOTE — TELEPHONE ENCOUNTER
----- Message from Kahlil Shi sent at 3/25/2024 10:51 AM CDT -----  Regarding: Running late  Contact: [phone]  Calling in regards to speaking with nurse to confirm report time and instructions for upcoming procedure/surgery on 03-26-24. Please call back as soon as possible to confirm details.

## 2024-03-25 NOTE — TELEPHONE ENCOUNTER
Spoke with patient. Provided arrival time and reviewed instructions. Sent prep instruction to patient through [facility].

## 2024-03-26 ENCOUNTER — ANESTHESIA EVENT (OUTPATIENT)
Dept: ENDOSCOPY | Facility: HOSPITAL | Age: 74
End: 2024-03-26
Payer: MEDICARE

## 2024-03-26 ENCOUNTER — TELEPHONE (OUTPATIENT)
Dept: GASTROENTEROLOGY | Facility: CLINIC | Age: 74
End: 2024-03-26
Payer: MEDICARE

## 2024-03-26 ENCOUNTER — ANESTHESIA (OUTPATIENT)
Dept: ENDOSCOPY | Facility: HOSPITAL | Age: 74
End: 2024-03-26
Payer: MEDICARE

## 2024-03-26 ENCOUNTER — HOSPITAL ENCOUNTER (OUTPATIENT)
Facility: HOSPITAL | Age: 74
Discharge: HOME | End: 2024-03-26
Attending: INTERNAL MEDICINE | Admitting: INTERNAL MEDICINE
Payer: MEDICARE

## 2024-03-26 VITALS
BODY MASS INDEX: 25.76 KG/M2 | TEMPERATURE: 98 F | RESPIRATION RATE: 16 BRPM | HEIGHT: 71 IN | HEART RATE: 75 BPM | OXYGEN SATURATION: 99 % | WEIGHT: 184 LBS | SYSTOLIC BLOOD PRESSURE: 133 MMHG | DIASTOLIC BLOOD PRESSURE: 73 MMHG

## 2024-03-26 DIAGNOSIS — B96.81 ACUTE H. PYLORI GASTRIC ULCER: Primary | ICD-10-CM

## 2024-03-26 DIAGNOSIS — K25.3 ACUTE H. PYLORI GASTRIC ULCER: Primary | ICD-10-CM

## 2024-03-26 PROCEDURE — 43239 EGD BIOPSY SINGLE/MULTIPLE: CPT | Performed by: INTERNAL MEDICINE

## 2024-03-26 PROCEDURE — 43239 EGD BIOPSY SINGLE/MULTIPLE: CPT | Mod: ,,, | Performed by: INTERNAL MEDICINE

## 2024-03-26 PROCEDURE — 88305 TISSUE EXAM BY PATHOLOGIST: CPT | Performed by: PATHOLOGY

## 2024-03-26 PROCEDURE — E9220 PRA ENDO ANESTHESIA: HCPCS | Mod: ,,, | Performed by: NURSE ANESTHETIST, CERTIFIED REGISTERED

## 2024-03-26 PROCEDURE — 88342 IMHCHEM/IMCYTCHM 1ST ANTB: CPT | Performed by: PATHOLOGY

## 2024-03-26 PROCEDURE — 27201012 HC FORCEPS, HOT/COLD, DISP: Performed by: INTERNAL MEDICINE

## 2024-03-26 PROCEDURE — 37000009 HC ANESTHESIA EA ADD 15 MINS: Performed by: INTERNAL MEDICINE

## 2024-03-26 PROCEDURE — 63600175 PHARM REV CODE 636 W HCPCS: Performed by: NURSE ANESTHETIST, CERTIFIED REGISTERED

## 2024-03-26 PROCEDURE — 88342 IMHCHEM/IMCYTCHM 1ST ANTB: CPT | Mod: 26,,, | Performed by: PATHOLOGY

## 2024-03-26 PROCEDURE — 25000003 PHARM REV CODE 250: Performed by: INTERNAL MEDICINE

## 2024-03-26 PROCEDURE — 37000008 HC ANESTHESIA 1ST 15 MINUTES: Performed by: INTERNAL MEDICINE

## 2024-03-26 PROCEDURE — 88305 TISSUE EXAM BY PATHOLOGIST: CPT | Mod: 26,,, | Performed by: PATHOLOGY

## 2024-03-26 PROCEDURE — 25000003 PHARM REV CODE 250: Performed by: NURSE ANESTHETIST, CERTIFIED REGISTERED

## 2024-03-26 RX ORDER — LIDOCAINE HYDROCHLORIDE 20 MG/ML
INJECTION INTRAVENOUS
Status: DISCONTINUED | OUTPATIENT
Start: 2024-03-26 | End: 2024-03-26

## 2024-03-26 RX ORDER — PROPOFOL 10 MG/ML
VIAL (ML) INTRAVENOUS
Status: DISCONTINUED | OUTPATIENT
Start: 2024-03-26 | End: 2024-03-26

## 2024-03-26 RX ORDER — SODIUM CHLORIDE 9 MG/ML
INJECTION, SOLUTION INTRAVENOUS CONTINUOUS
Status: DISCONTINUED | OUTPATIENT
Start: 2024-03-26 | End: 2024-03-26 | Stop reason: HOSPADM

## 2024-03-26 RX ORDER — PROPOFOL 10 MG/ML
INJECTION, EMULSION INTRAVENOUS CONTINUOUS PRN
Status: DISCONTINUED | OUTPATIENT
Start: 2024-03-26 | End: 2024-03-26

## 2024-03-26 RX ADMIN — PROPOFOL 200 MCG/KG/MIN: 10 INJECTION, EMULSION INTRAVENOUS at 02:03

## 2024-03-26 RX ADMIN — GLYCOPYRROLATE 0.2 MG: 0.2 INJECTION, SOLUTION INTRAMUSCULAR; INTRAVENOUS at 02:03

## 2024-03-26 RX ADMIN — PROPOFOL 60 MG: 10 INJECTION, EMULSION INTRAVENOUS at 02:03

## 2024-03-26 RX ADMIN — LIDOCAINE HYDROCHLORIDE 50 MG: 20 INJECTION INTRAVENOUS at 02:03

## 2024-03-26 RX ADMIN — SODIUM CHLORIDE: 0.9 INJECTION, SOLUTION INTRAVENOUS at 12:03

## 2024-03-26 RX ADMIN — SODIUM CHLORIDE: 0.9 INJECTION, SOLUTION INTRAVENOUS at 02:03

## 2024-03-26 NOTE — PROVATION PATIENT INSTRUCTIONS
Discharge Summary/Instructions after an Endoscopic Procedure  Patient Name: Jerman Miguel  Patient MRN: [id number]  Patient YOB: 1950 Tuesday, March 26, 2024  Mio Solis MD  Dear patient,  As a result of recent federal legislation (The Federal Cures Act), you may   receive lab or pathology results from your procedure in your [facility]   account before your physician is able to contact you. Your physician or   their representative will relay the results to you with their   recommendations at their soonest availability.  Thank you,  RESTRICTIONS:  During your procedure today, you received medications for sedation.  These   medications may affect your judgment, balance and coordination.  Therefore,   for 24 hours, you have the following restrictions:   - DO NOT drive a car, operate machinery, make legal/financial decisions,   sign important papers or drink alcohol.    ACTIVITY:  Today: no heavy lifting, straining or running due to procedural   sedation/anesthesia.  The following day: return to full activity including work.  DIET:  Eat and drink normally unless instructed otherwise.     TREATMENT FOR COMMON SIDE EFFECTS:  - Mild abdominal pain, nausea, belching, bloating or excessive gas:  rest,   eat lightly and use a heating pad.  - Sore Throat: treat with throat lozenges and/or gargle with warm salt   water.  - Because air was used during the procedure, expelling large amounts of air   from your rectum or belching is normal.  - If a bowel prep was taken, you may not have a bowel movement for 1-3 days.    This is normal.  SYMPTOMS TO WATCH FOR AND REPORT TO YOUR PHYSICIAN:  1. Abdominal pain or bloating, other than gas cramps.  2. Chest pain.  3. Back pain.  4. Signs of infection such as: chills or fever occurring within 24 hours   after the procedure.  5. Rectal bleeding, which would show as bright red, maroon, or black stools.   (A tablespoon of blood from the rectum is not serious, especially  if   hemorrhoids are present.)  6. Vomiting.  7. Weakness or dizziness.  GO DIRECTLY TO THE NEAREST EMERGENCY ROOM IF YOU HAVE ANY OF THE FOLLOWING:      Difficulty breathing              Chills and/or fever over 101 F   Persistent vomiting and/or vomiting blood   Severe abdominal pain   Severe chest pain   Black, tarry stools   Bleeding- more than one tablespoon   Any other symptom or condition that you feel may need urgent attention  Your doctor recommends these additional instructions:  If any biopsies were taken, your doctors clinic will contact you in 1 to 2   weeks with any results.  - Discharge patient to home.   - Patient has a contact number available for emergencies.  The signs and   symptoms of potential delayed complications were discussed with the   patient.  Return to normal activities tomorrow.  Written discharge   instructions were provided to the patient.   - Resume previous diet.   - Continue present medications.   - Await pathology results.  For questions, problems or results please call your physician - Mio Solis MD at Work:  [phone].  [facility], EMERGENCY ROOM PHONE NUMBER: [phone]  IF A COMPLICATION OR EMERGENCY SITUATION ARISES AND YOU ARE UNABLE TO REACH   YOUR PHYSICIAN - GO DIRECTLY TO THE EMERGENCY ROOM.  Mio Solis MD  3/26/2024 2:57:40 PM  This report has been verified and signed electronically.  Dear patient,  As a result of recent federal legislation (The Federal Cures Act), you may   receive lab or pathology results from your procedure in your [facility]   account before your physician is able to contact you. Your physician or   their representative will relay the results to you with their   recommendations at their soonest availability.  Thank you,  PROVATION

## 2024-03-26 NOTE — TRANSFER OF CARE
"Anesthesia Transfer of Care Note    Patient: Jerman Miguel    Procedure(s) Performed: Procedure(s) (LRB):  EGD (ESOPHAGOGASTRODUODENOSCOPY) (N/A)    Patient location: PACU    Anesthesia Type: general    Transport from OR: Transported from OR on room air with adequate spontaneous ventilation    Post pain: adequate analgesia    Post assessment: no apparent anesthetic complications and tolerated procedure well    Post vital signs: stable    Level of consciousness: responds to stimulation    Nausea/Vomiting: no vomiting    Complications: none    Transfer of care protocol was followed    Last vitals: Visit Vitals  /78 (BP Location: Left arm, Patient Position: Lying)   Pulse 84   Temp 36.7 °C (98.1 °F) (Oral)   Resp 17   Ht 5' 11" (1.803 m)   Wt 83.5 kg (184 lb)   SpO2 95%   BMI 25.66 kg/m²     "

## 2024-03-26 NOTE — H&P
Short Stay Endoscopy History and Physical    PCP - WILIAN Morales MD    Procedure - EGD  ASA - per anesthesia  Mallampati - per anesthesia  History of Anesthesia problems - no  Family history Anesthesia problems -  no   Plan of anesthesia - MAC    HPI:  This is a 73 y.o. male here for evaluation of gastric and duodenal ulcers, and H pylori infection.  He completed quadruple therapy and has been off PPI for a week. He denies GI complaints.         ROS:  Constitutional: No fevers, chills  CV: No chest pain  Pulm: No cough, No shortness of breath  Ophtho: No vision changes  GI: see HPI    Medical History:  has a past medical history of Arthritis of right foot (10/05/2016) and HTN (hypertension) (12/02/2014).    Surgical History:  has a past surgical history that includes Spine surgery (1995); Right elbow (1988); Colonoscopy (2014); Esophagogastroduodenoscopy (N/A, 12/27/2023); and Colonoscopy (N/A, 12/27/2023).    Family History: family history includes Asthma in his brother; Diabetes in his mother; Stomach cancer in his father.. Otherwise no colon cancer, inflammatory bowel disease, or GI malignancies.    Social History:  reports that he has never smoked. He has never used smokeless tobacco. He reports that he does not drink alcohol and does not use drugs.    Review of patient's allergies indicates:  No Known Allergies    Medications:   Medications Prior to Admission   Medication Sig Dispense Refill Last Dose    amLODIPine (NORVASC) 5 MG tablet TAKE 1 TABLET(5 MG) BY MOUTH EVERY DAY 90 tablet 2 3/25/2024    cholecalciferol, vitamin D3, 1,000 unit capsule Take 2,000 Units by mouth once daily.   3/25/2024    ferrous sulfate (FEOSOL) 325 mg (65 mg iron) Tab tablet Take 325 mg by mouth 2 (two) times daily.   Past Month    hydrOXYzine pamoate (VISTARIL) 25 MG Cap Take 1 capsule (25 mg total) by mouth every 8 (eight) hours as needed (itching). 30 capsule 0 3/25/2024    lisinopriL (PRINIVIL,ZESTRIL) 20 MG tablet TAKE 1  TABLET(20 MG) BY MOUTH EVERY DAY 90 tablet 3 3/25/2024    omeprazole (PRILOSEC) 20 MG capsule Take 1 capsule daily 14 capsule 0 Unknown       Physical Exam:    Vital Signs:   Vitals:    03/26/24 1223   BP: 128/81   Pulse: 74   Resp: 17   Temp: 98.6 °F (37 °C)       General Appearance: Well appearing in no acute distress  Eyes:    No scleral icterus  Lungs: CTA anteriorly  Heart:  Regular rate and rhythm   Abdomen: Soft, non tender, non distended with normal bowel sounds.      Labs:  Lab Results   Component Value Date    WBC 5.27 12/27/2023    HGB 7.9 (L) 12/27/2023    HCT 24.2 (L) 12/27/2023    MCV 79 (L) 12/27/2023     12/27/2023        BMP  Lab Results   Component Value Date     12/27/2023    K 4.0 12/27/2023     12/27/2023    CO2 22 (L) 12/27/2023    BUN 12 12/27/2023    CREATININE 0.8 12/27/2023    CALCIUM 8.2 (L) 12/27/2023    ANIONGAP 10 12/27/2023    ESTGFRAFRICA >60 05/04/2022    EGFRNONAA >60 05/04/2022     Lab Results   Component Value Date    INR 1.0 03/11/2024    INR 1.0 12/25/2023    INR 1.0 09/15/2021          Assessment:  73 y.o. male with acute H pylori associated gastroduodenal ulcers.     Plan:  Proceed with EGD today.  I have explained the risks and benefits of endoscopy procedures to the patient including but not limited to bleeding, perforation, infection, and death.  All questions answered.      Mio Solis MD

## 2024-03-26 NOTE — TELEPHONE ENCOUNTER
----- Message from Farzana Griffin sent at 3/26/2024  8:59 AM CDT -----  Pt calling to arrival time on procedure was told 2 different times     Confirmed patient's contact info below:  Contact Name: Jerman Calleskhadra  Phone Number: [phone]

## 2024-03-26 NOTE — ANESTHESIA PREPROCEDURE EVALUATION
03/26/2024  Jerman Miguel is a 73 y.o., male.    Past Medical History:   Diagnosis Date    Arthritis of right foot 10/05/2016    HTN (hypertension) 12/02/2014 2014     Patient Active Problem List   Diagnosis    Essential hypertension    Chronic pain of right ankle    Right foot pain    Decreased ROM of ankle    Dyslipidemia    Prediabetes    Ureteral stone    Diverticulosis    Acquired complex renal cyst    Chronic pain of left knee    Acute blood loss anemia    Colitis    Duodenal ulcer    Aortic atherosclerosis     Social History     Socioeconomic History    Marital status:     Number of children: 5   Occupational History    Occupation: Retired   Tobacco Use    Smoking status: Never    Smokeless tobacco: Never   Substance and Sexual Activity    Alcohol use: No    Drug use: No    Sexual activity: Yes     Partners: Female   Social History Narrative    No regular exercise     Past Surgical History:   Procedure Laterality Date    COLONOSCOPY  2014    COLONOSCOPY N/A 12/27/2023    Procedure: COLONOSCOPY;  Surgeon: Jorje Miller MD;  Location: [facility] ([address] [place] [address]);  Service: Endoscopy;  Laterality: N/A;    ESOPHAGOGASTRODUODENOSCOPY N/A 12/27/2023    Procedure: EGD (ESOPHAGOGASTRODUODENOSCOPY);  Surgeon: Jorje Miller MD;  Location: [address]);  Service: Endoscopy;  Laterality: N/A;    Right elbow  1988    SPINE SURGERY  1995    L-spine     No current facility-administered medications on file prior to encounter.     Current Outpatient Medications on File Prior to Encounter   Medication Sig Dispense Refill    amLODIPine (NORVASC) 5 MG tablet TAKE 1 TABLET(5 MG) BY MOUTH EVERY DAY 90 tablet 2    cholecalciferol, vitamin D3, 1,000 unit capsule Take 2,000 Units by mouth once daily.       Pre-op Assessment    I have reviewed the NPO Status.      Review of Systems  Anesthesia Hx:                Denies Personal Hx of Anesthesia complications.                    Hematology/Oncology:       -- Anemia:                                  Cardiovascular:     Hypertension                                        Pulmonary:  Pulmonary Normal                       Renal/:  Renal/ Normal                 Hepatic/GI:   PUD,               Musculoskeletal:  Arthritis               Neurological:  Neurology Normal                                      Endocrine:  Endocrine Normal                Physical Exam    Airway:  Mallampati: II   Neck ROM: Normal ROM    Dental:  Partial Dentures        Anesthesia Plan  Type of Anesthesia, risks & benefits discussed:    Anesthesia Type: Gen Natural Airway  Intra-op Monitoring Plan: Standard ASA Monitors  Induction:  IV  Informed Consent: Informed consent signed with the Patient and all parties understand the risks and agree with anesthesia plan.  All questions answered.   ASA Score: 3    Ready For Surgery From Anesthesia Perspective.     .

## 2024-03-26 NOTE — ANESTHESIA POSTPROCEDURE EVALUATION
Anesthesia Post Evaluation    Patient: Jerman Miguel    Procedure(s) Performed: Procedure(s) (LRB):  EGD (ESOPHAGOGASTRODUODENOSCOPY) (N/A)    Final Anesthesia Type: general      Patient location during evaluation: GI PACU  Patient participation: Yes- Able to Participate  Level of consciousness: awake and alert and oriented  Post-procedure vital signs: reviewed and stable  Pain management: adequate  Airway patency: patent    PONV status at discharge: No PONV  Anesthetic complications: no      Cardiovascular status: stable  Respiratory status: unassisted, spontaneous ventilation and room air  Hydration status: euvolemic  Follow-up not needed.          Vitals Value Taken Time   /78 03/26/24 1500   Temp 36.7 °C (98.1 °F) 03/26/24 1500   Pulse 84 03/26/24 1500   Resp 17 03/26/24 1500   SpO2 95 % 03/26/24 1500         No case tracking events are documented in the log.      Pain/Shamika Score: Shamika Score: 8 (3/26/2024  3:01 PM)

## 2024-04-01 LAB
COMMENT: NORMAL
FINAL PATHOLOGIC DIAGNOSIS: NORMAL
GROSS: NORMAL
Lab: NORMAL

## 2024-04-04 ENCOUNTER — CLINICAL SUPPORT (OUTPATIENT)
Dept: REHABILITATION | Facility: HOSPITAL | Age: 74
End: 2024-04-04
Payer: MEDICARE

## 2024-04-04 DIAGNOSIS — Z74.09 DECREASED FUNCTIONAL MOBILITY AND ENDURANCE: Primary | ICD-10-CM

## 2024-04-04 PROCEDURE — 97162 PT EVAL MOD COMPLEX 30 MIN: CPT | Mod: PN

## 2024-04-04 PROCEDURE — 97110 THERAPEUTIC EXERCISES: CPT | Mod: PN

## 2024-04-04 NOTE — PROGRESS NOTES
[facility]  Physical Therapy Initial Evaluation - Knee    Name: Jerman GALINDO [facility]  Clinic Number: [id number]    Therapy Diagnosis:   Encounter Diagnosis   Name Primary?    Decreased functional mobility and endurance Yes     Physician: Hussain Wynne Jr., *    Physician Orders: PT Eval and Treat Left TKA  Medical Diagnosis from Referral: Left TKA  Evaluation Date: 4/4/2024  Authorization Period Expiration: 12/31/2024  Plan of Care Expiration: 5/16/2024 or 12 Visit  Progress Note Due: 5/4/2024  Visit # / Visits authorized: 1/ 1  Visits Remaining - 0  PTA visit #: 0/6      Eval Visit FOTO-  (4/4/2024 xx%)   5th Visit FOTO   -  (Date/Score)   10th Visit FOTO  -  (Date/Score)   D/C FOTO          -  (Date/Score)     Time In: 9:00  Time Out: 10:00  Total Appointment Time (timed & untimed codes): 60 minutes    Precautions: Standard    Subjective     History of current condition - Jerman is a 73 y.o. year old male who presents to the [facility] PT clinic  with complaint of Left Knee Pain following a TKA that was done on 4/2/2024. He was referred to OPPT upon DC for treatment. Current symptoms include: pain, swelling, decreased ROM, difficulty walking. Aggravating factors: knee movement.Patients presently rates pain 4/10 on pain scale.    Falls: 0    Mechanism of Injury: Surgery  Next MD Visit:  2 weeks        Imaging:X-ray     Prior Therapy: Land based therapy received for current condition   Social History: Jerman lives in a multiple story home with 0 steps to enter and  lives with their family  Assistive Devices Owned: bedside commode, straight cane, and walker   Occupation: Retired   Hobbies/Exercise: none  Hand Dominance: right  Prior Level of Function: Independent  Current Level of Function: Modified Independent secondary to Left TKA    Pain:  Current 4/10, worst 10/10 , best 4/10 (Pain medication and rest reduces pain)  Description: Aching and Dull  Aggravating Factors: Flexing  Easing  Factors: pain medication and rest    Pts goals: Return to prior level of function    Medical History:   Past Medical History:   Diagnosis Date    Arthritis of right foot 10/05/2016    HTN (hypertension) 12/02/2014 2014       Surgical History:   Jerman Miguel  has a past surgical history that includes Spine surgery (1995); Right elbow (1988); Colonoscopy (2014); Esophagogastroduodenoscopy (N/A, 12/27/2023); Colonoscopy (N/A, 12/27/2023); and Esophagogastroduodenoscopy (N/A, 3/26/2024).    Medications:   Jerman has a current medication list which includes the following prescription(s): amlodipine, cholecalciferol (vitamin d3), ferrous sulfate, hydroxyzine pamoate, and lisinopril.    Allergies:   Review of patient's allergies indicates:  No Known Allergies     Objective     Posture: Fair  Palpation: mild generalized muscle tenderness  Sensation: intact to light touch      Range of Motion/Strength:     Knee Left Right Pain/Dysfunction with Movement   AROM      Knee Flexion (140)  98 °   WFL °     Knee Extension (0)  -5 °   WFL °           RLE 5/5 4+/5 4/5 4-/5 3+/5 3/5 3-/5 2+/5 2/5 2-/5 1/5 0 NT   Hip Flexion  x                   Hip Extension  x                   Hip Abduction  x                   Hip Adduction  x                   Hip Internal Rotation  x                   Hip External Rotation  x                   Knee Flexion  x                   Knee Extension  x                   Ankle Dorsiflexion  x                   Ankle Plantarflexion  x                      LLE 5/5 4+/5 4/5 4-/5 3+/5 3/5 3-/5 2+/5 2/5 2-/5 1/5 0 NT   Hip Flexion  x                        Hip Extension  x                        Hip Abduction  x                        Hip Adduction  x                        Hip Internal Rotation  x                        Hip External Rotation  x                        Knee Flexion          x                Knee Extension          x                Ankle Dorsiflexion  x                        Ankle  Plantarflexion  x                             Flexibility       90/90 Hamstrings  -25 ° -25 °      Gait Analysis   Jerman ambulated 50 feet with rolling walker device with independence assistance. Jerman displays antalgic and slow gait deviation during 1 gait cycle.      Other:   Sit to Stand - 9 Reps. Completed in 30 sec.        Intake Outcome Measure for FOTO Knee Survey    Therapist reviewed FOTO scores for Jerman Miguel on 4/4/2024.   FOTO report -  see Media section or FOTO account episode details.    Limitation Score: 66%           TREATMENT   Treatment Time In: 9:00  Treatment Time Out: 10:00  Total Treatment time (time-based codes) separate from Evaluation: 15 minutes      Jerman received the treatments listed below:      therapeutic exercises to develop strength, endurance, ROM, and flexibility for 15 minutes including:    QS x 30  SLR x 30  LAQs x 30  Seated HR/ Toe Ups x 30  Seated Marches x 30    Home Exercises and Patient Education Provided    Patient Education and Home Exercises     Education provided:   Patient was provided educational information regarding: role of Physical Therapy, short and long term goals, patient/therapist expectations, scheduling, and attendance policy.    Written Home Exercises Provided: yes. Exercises were reviewed and Jerman was able to demonstrate them prior to the end of the session.  Jerman demonstrated good  understanding of the education provided. See EMR under Patient Instructions for exercises provided during therapy sessions.    Assessment     Jerman is a 73 y.o. male referred to outpatient Physical Therapy with a medical diagnosis of Left Knee Pain following a TKA that was performed on 4/2/2024. Pt currently presents with LE   weakness, impaired endurance, impaired functional mobility, gait instability, impaired balance, decreased lower extremity function, pain, decreased ROM, edema, and impaired muscle length. He is a good candidate for OPPT to address  problem list.    Pt prognosis is Good.   Patient will benefit from skilled outpatient Physical Therapy to address the deficits stated above and in the chart below, provide patient /family education, and to maximize patientt's level of independence.     Plan of care discussed with patient: Yes  Patient's spiritual, cultural and educational needs considered and patient is agreeable to the plan of care and goals as stated below:     Anticipated Barriers for therapy: None Identified during initial evaluation     Medical Necessity is demonstrated by the following  History  Co-morbidities and personal factors that may impact the plan of care [] LOW: no personal factors / co-morbidities  [x] MODERATE: 1-2 personal factors / co-morbidities  [] HIGH: 3+ personal factors / co-morbidities    Moderate / High Support Documentation:   Co-morbidities affecting plan of care:   ----------------------------  Arthritis of right foot  HTN (hypertension)      Comment:  2014    Personal Factors:   no deficits     Examination  Body Structures and Functions, activity limitations and participation restrictions that may impact the plan of care [] LOW: addressing 1-2 elements  [x] MODERATE: 3+ elements  [] HIGH: 4+ elements (please support below)    Moderate / High Support Documentation:   ----------------------------  Arthritis of right foot  HTN (hypertension)      Comment:  2014   Clinical Presentation [] LOW: stable  [x] MODERATE: Evolving  [] HIGH: Unstable     Decision Making/ Complexity Score: moderate        Goals:         Short Term Goals: 3 weeks4/25/2024 Goal   Status    Pt. Will improve knee FOTO questionnaire to 85%      Pt. Will improve right knee flexion to 120 degrees     Pt. Will improve HS flexibility by demonstrating -2 degrees of knee extension 90/90 test position      Pt. Will improve LE strength grade to 4+/5 throughout            Long Term Goals: 6 weeks5/16/2024 Goal Status    Pt. Will improve knee FOTO questionnaire  to 100%    Pt. Will improve left knee flexion to WFL / pain free   Pt. Will improve HS flexibility by demonstrating 0 degrees of knee extension 90/90 test position        Pt. Will improve LE strength grade to 5/5 throughout      Pt. Will normalize gait pattern.      Pt. Will return to prior level of function      Pt. Will STS x 15x or better in 30 seconds.          Plan   Plan of care Certification: 4/4/2024 to  5/16/2024     Outpatient Physical Therapy 2 times weekly for 6 weeks to include the following interventions: Electrical Stimulation IFC, Manual Therapy, Moist Heat/ Ice, Neuromuscular Re-ed, Therapeutic Activities, Therapeutic Exercise, and Dry Needling .     Spencer Arriaga, PT,DPT  4/4/2024

## 2024-04-04 NOTE — PLAN OF CARE
[facility]  Physical Therapy Initial Evaluation - Knee    Name: Jerman GALINDO [facility]  Clinic Number: [id number]    Therapy Diagnosis:   Encounter Diagnosis   Name Primary?    Decreased functional mobility and endurance Yes     Physician: Hussain Wynne Jr., *    Physician Orders: PT Eval and Treat Left TKA  Medical Diagnosis from Referral: Left TKA  Evaluation Date: 4/4/2024  Authorization Period Expiration: 12/31/2024  Plan of Care Expiration: 5/16/2024 or 12 Visit  Progress Note Due: 5/4/2024  Visit # / Visits authorized: 1/ 1  Visits Remaining - 0  PTA visit #: 0/6      Eval Visit FOTO-  (4/4/2024 xx%)   5th Visit FOTO   -  (Date/Score)   10th Visit FOTO  -  (Date/Score)   D/C FOTO          -  (Date/Score)     Time In: 9:00  Time Out: 10:00  Total Appointment Time (timed & untimed codes): 60 minutes    Precautions: Standard    Subjective     History of current condition - Jerman is a 73 y.o. year old male who presents to the [facility] PT clinic  with complaint of Left Knee Pain following a TKA that was done on 4/2/2024. He was referred to OPPT upon DC for treatment. Current symptoms include: pain, swelling, decreased ROM, difficulty walking. Aggravating factors: knee movement.Patients presently rates pain 4/10 on pain scale.    Falls: 0    Mechanism of Injury: Surgery  Next MD Visit:  2 weeks        Imaging:X-ray     Prior Therapy: Land based therapy received for current condition   Social History: Jerman lives in a multiple story home with 0 steps to enter and  lives with their family  Assistive Devices Owned: bedside commode, straight cane, and walker   Occupation: Retired   Hobbies/Exercise: none  Hand Dominance: right  Prior Level of Function: Independent  Current Level of Function: Modified Independent secondary to Left TKA    Pain:  Current 4/10, worst 10/10 , best 4/10 (Pain medication and rest reduces pain)  Description: Aching and Dull  Aggravating Factors: Flexing  Easing  Factors: pain medication and rest    Pts goals: Return to prior level of function    Medical History:   Past Medical History:   Diagnosis Date    Arthritis of right foot 10/05/2016    HTN (hypertension) 12/02/2014 2014       Surgical History:   Jerman Miguel  has a past surgical history that includes Spine surgery (1995); Right elbow (1988); Colonoscopy (2014); Esophagogastroduodenoscopy (N/A, 12/27/2023); Colonoscopy (N/A, 12/27/2023); and Esophagogastroduodenoscopy (N/A, 3/26/2024).    Medications:   Jerman has a current medication list which includes the following prescription(s): amlodipine, cholecalciferol (vitamin d3), ferrous sulfate, hydroxyzine pamoate, and lisinopril.    Allergies:   Review of patient's allergies indicates:  No Known Allergies     Objective     Posture: Fair  Palpation: mild generalized muscle tenderness  Sensation: intact to light touch      Range of Motion/Strength:     Knee Left Right Pain/Dysfunction with Movement   AROM      Knee Flexion (140)  98 °   WFL °     Knee Extension (0)  -5 °   WFL °           RLE 5/5 4+/5 4/5 4-/5 3+/5 3/5 3-/5 2+/5 2/5 2-/5 1/5 0 NT   Hip Flexion  x                   Hip Extension  x                   Hip Abduction  x                   Hip Adduction  x                   Hip Internal Rotation  x                   Hip External Rotation  x                   Knee Flexion  x                   Knee Extension  x                   Ankle Dorsiflexion  x                   Ankle Plantarflexion  x                      LLE 5/5 4+/5 4/5 4-/5 3+/5 3/5 3-/5 2+/5 2/5 2-/5 1/5 0 NT   Hip Flexion  x                        Hip Extension  x                        Hip Abduction  x                        Hip Adduction  x                        Hip Internal Rotation  x                        Hip External Rotation  x                        Knee Flexion          x                Knee Extension          x                Ankle Dorsiflexion  x                        Ankle  Plantarflexion  x                             Flexibility       90/90 Hamstrings  -25 ° -25 °      Gait Analysis   Jerman ambulated 50 feet with rolling walker device with independence assistance. Jerman displays antalgic and slow gait deviation during 1 gait cycle.      Other:   Sit to Stand - 9 Reps. Completed in 30 sec.        Intake Outcome Measure for FOTO Knee Survey    Therapist reviewed FOTO scores for Jerman Miguel on 4/4/2024.   FOTO report -  see Media section or FOTO account episode details.    Limitation Score: 66%           TREATMENT   Treatment Time In: 9:00  Treatment Time Out: 10:00  Total Treatment time (time-based codes) separate from Evaluation: 15 minutes      Jerman received the treatments listed below:      therapeutic exercises to develop strength, endurance, ROM, and flexibility for 15 minutes including:    QS x 30  SLR x 30  LAQs x 30  Seated HR/ Toe Ups x 30  Seated Marches x 30    Home Exercises and Patient Education Provided    Patient Education and Home Exercises     Education provided:   Patient was provided educational information regarding: role of Physical Therapy, short and long term goals, patient/therapist expectations, scheduling, and attendance policy.    Written Home Exercises Provided: yes. Exercises were reviewed and Jerman was able to demonstrate them prior to the end of the session.  Jerman demonstrated good  understanding of the education provided. See EMR under Patient Instructions for exercises provided during therapy sessions.    Assessment     Jerman is a 73 y.o. male referred to outpatient Physical Therapy with a medical diagnosis of Left Knee Pain following a TKA that was performed on 4/2/2024. Pt currently presents with LE   weakness, impaired endurance, impaired functional mobility, gait instability, impaired balance, decreased lower extremity function, pain, decreased ROM, edema, and impaired muscle length. He is a good candidate for OPPT to address  problem list.    Pt prognosis is Good.   Patient will benefit from skilled outpatient Physical Therapy to address the deficits stated above and in the chart below, provide patient /family education, and to maximize patientt's level of independence.     Plan of care discussed with patient: Yes  Patient's spiritual, cultural and educational needs considered and patient is agreeable to the plan of care and goals as stated below:     Anticipated Barriers for therapy: None Identified during initial evaluation     Medical Necessity is demonstrated by the following  History  Co-morbidities and personal factors that may impact the plan of care [] LOW: no personal factors / co-morbidities  [x] MODERATE: 1-2 personal factors / co-morbidities  [] HIGH: 3+ personal factors / co-morbidities    Moderate / High Support Documentation:   Co-morbidities affecting plan of care:   ----------------------------  Arthritis of right foot  HTN (hypertension)      Comment:  2014    Personal Factors:   no deficits     Examination  Body Structures and Functions, activity limitations and participation restrictions that may impact the plan of care [] LOW: addressing 1-2 elements  [x] MODERATE: 3+ elements  [] HIGH: 4+ elements (please support below)    Moderate / High Support Documentation:   ----------------------------  Arthritis of right foot  HTN (hypertension)      Comment:  2014   Clinical Presentation [] LOW: stable  [x] MODERATE: Evolving  [] HIGH: Unstable     Decision Making/ Complexity Score: moderate        Goals:         Short Term Goals: 3 weeks4/25/2024 Goal   Status    Pt. Will improve knee FOTO questionnaire to 85%      Pt. Will improve right knee flexion to 120 degrees     Pt. Will improve HS flexibility by demonstrating -2 degrees of knee extension 90/90 test position      Pt. Will improve LE strength grade to 4+/5 throughout            Long Term Goals: 6 weeks5/16/2024 Goal Status    Pt. Will improve knee FOTO questionnaire  to 100%    Pt. Will improve left knee flexion to WFL / pain free   Pt. Will improve HS flexibility by demonstrating 0 degrees of knee extension 90/90 test position        Pt. Will improve LE strength grade to 5/5 throughout      Pt. Will normalize gait pattern.      Pt. Will return to prior level of function      Pt. Will STS x 15x or better in 30 seconds.          Plan   Plan of care Certification: 4/4/2024 to  5/16/2024     Outpatient Physical Therapy 2 times weekly for 6 weeks to include the following interventions: Electrical Stimulation IFC, Manual Therapy, Moist Heat/ Ice, Neuromuscular Re-ed, Therapeutic Activities, Therapeutic Exercise, and Dry Needling.     Spencer Arriaga, PT,DPT  4/4/2024

## 2024-04-08 ENCOUNTER — CLINICAL SUPPORT (OUTPATIENT)
Dept: REHABILITATION | Facility: HOSPITAL | Age: 74
End: 2024-04-08
Payer: MEDICARE

## 2024-04-08 DIAGNOSIS — Z74.09 DECREASED FUNCTIONAL MOBILITY AND ENDURANCE: Primary | ICD-10-CM

## 2024-04-08 PROCEDURE — 97110 THERAPEUTIC EXERCISES: CPT | Mod: PN

## 2024-04-08 NOTE — PROGRESS NOTES
[facility]   Physical Therapy Treatment Note        Name: Jerman GALINDO [facility]  Clinic Number: [id number]    Therapy Diagnosis:   Encounter Diagnosis   Name Primary?    Decreased functional mobility and endurance Yes     Physician: Hussain yWnne Jr., *    Visit Date: 4/8/2024       Physician Orders: PT Eval and Treat Left TKA  Medical Diagnosis from Referral: Left TKA  Evaluation Date: 4/4/2024  Authorization Period Expiration: 12/31/2024  Plan of Care Expiration: 5/16/2024 or 12 Visit  Progress Note Due: 5/4/2024  Visit # / Visits authorized: 1/ 20  Visits Remaining - 14  PTA visit #: 0/6        Eval Visit FOTO-  (4/4/2024 xx%)   5th Visit FOTO   -  (Date/Score)   10th Visit FOTO  -  (Date/Score)   D/C FOTO          -  (Date/Score)     Time In: 2:00  Time Out: 3:00  Billable Time: 30 minutes    Precautions: Standard  Insurance: [facility] [employer] [facility] / Plan: [facility] CHOICES / Product Type: Medicare Advantage /     Subjective     Pt reports: Mild Pain Today.  He was compliant with home exercise program.  Response to previous treatment: 1st visit  Functional change: 1st visit    Pain: 2/10      Objective     Objective Measures Performed - Evaluation     Treatment      Jerman received the treatments listed below (Bold exercise performed during 4/8/2024 visit):        therapeutic exercises to develop strength, endurance, ROM, and flexibility for 60 minutes including:     QS x 30  SLR x 30  LAQs x 30  Seated HR/ Toe Ups x 30  Seated Marches x 30       Warm-up    Bike 10 min    Supine        Seated        Standing      STS x 30  Squats x 30  Marches x 30  Hip Ab. x 30  Hip Ext. X 30      manual therapy techniques: Joint mobilizations were applied to the: left knee for 00 minutes, including:    Manual Therapy  (amplitude and time)                             neuromuscular re-education activities to improve: Balance, Coordination, and Proprioception for 00 minutes. The  following activities were included:  Activities performed   (duration/resistance)                               therapeutic activities to improve functional performance for 00  minutes, including:    Activities performed   (duration/resistance)                             supervised modalities after being cleared for contradictions: IFC Electrical Stimulation:  Jerman received IFC Electrical Stimulation for pain control applied to the left knee.       Home Exercises Provided and Patient Education Provided     Education provided:   - Continue with HEP    Written Home Exercises Provided: yes. Exercises were reviewed and Jerman was able to demonstrate them prior to the end of the session.  Jerman demonstrated good  understanding of the education provided. See EMR under Patient Instructions for exercises provided during therapy sessions    Assessment     Pt did require an increase in verbal and tactile cueing during today's treatment session due to it being his first visit since initial evaluation.  Pt tolerated treatment session well and will continue to benefit from skilled therapy to address deficits.  Pt performed today's therapeutic interventions without adverse events.  Pt educated to continue HEP to improve progression.     Jerman Is progressing well towards his goals.   Pt prognosis is Good.     Pt will continue to benefit from skilled outpatient physical therapy to address the deficits listed in the problem list box on initial evaluation, provide pt/family education and to maximize pt's level of independence in the home and community environment.     Pt's spiritual, cultural and educational needs considered and pt agreeable to plan of care and goals.    Anticipated barriers to physical therapy: none      Goals:         Short Term Goals: 3 weeks4/25/2024 Goal   Status    Pt. Will improve knee FOTO questionnaire to 85%      Pt. Will improve right knee flexion to 120 degrees     Pt. Will improve HS flexibility by  demonstrating -2 degrees of knee extension 90/90 test position      Pt. Will improve LE strength grade to 4+/5 throughout            Long Term Goals: 6 weeks5/16/2024 Goal Status    Pt. Will improve knee FOTO questionnaire to 100%    Pt. Will improve left knee flexion to WFL / pain free   Pt. Will improve HS flexibility by demonstrating 0 degrees of knee extension 90/90 test position        Pt. Will improve LE strength grade to 5/5 throughout      Pt. Will normalize gait pattern.      Pt. Will return to prior level of function      Pt. Will STS x 15x or better in 30 seconds.          Plan     Continued with current POC      Spencer Arriaga, PT ,DPT  4/8/2024

## 2024-04-08 NOTE — PROGRESS NOTES
[facility]  Physical Therapy Initial Evaluation - Knee    Name: Jerman GALINDO [facility]  Clinic Number: [id number]    Therapy Diagnosis:   Encounter Diagnosis   Name Primary?    Decreased functional mobility and endurance Yes     Physician: Hussain Wynne Jr., *    Physician Orders: PT Eval and Treat Left TKA  Medical Diagnosis from Referral: Left TKA  Evaluation Date: 4/8/2024  Authorization Period Expiration: 12/31/2024  Plan of Care Expiration: 5/16/2024 or 12 Visit  Progress Note Due: 5/8/2024  Visit # / Visits authorized: 1/ 1  Visits Remaining - 0  PTA visit #: 0/6      Eval Visit FOTO-  (4/4/2024 xx%)   5th Visit FOTO   -  (Date/Score)   10th Visit FOTO  -  (Date/Score)   D/C FOTO          -  (Date/Score)     Time In: 9:00  Time Out: 10:00  Total Appointment Time (timed & untimed codes): 60 minutes    Precautions: Standard    Subjective     History of current condition - Jerman is a 73 y.o. year old male who presents to the [facility] PT clinic  with complaint of Left Knee Pain following a TKA that was done on 4/2/2024. He was referred to OPPT upon DC for treatment. Current symptoms include: pain, swelling, decreased ROM, difficulty walking. Aggravating factors: knee movement.Patients presently rates pain 4/10 on pain scale.    Falls: 0    Mechanism of Injury: Surgery  Next MD Visit:  2 weeks        Imaging:X-ray     Prior Therapy: Land based therapy received for current condition   Social History: Jerman lives in a multiple story home with 0 steps to enter and  lives with their family  Assistive Devices Owned: bedside commode, straight cane, and walker   Occupation: Retired   Hobbies/Exercise: none  Hand Dominance: right  Prior Level of Function: Independent  Current Level of Function: Modified Independent secondary to Left TKA    Pain:  Current 4/10, worst 10/10 , best 4/10 (Pain medication and rest reduces pain)  Description: Aching and Dull  Aggravating Factors: Flexing  Easing  Factors: pain medication and rest    Pts goals: Return to prior level of function    Medical History:   Past Medical History:   Diagnosis Date    Arthritis of right foot 10/05/2016    HTN (hypertension) 12/02/2014 2014       Surgical History:   Jerman Miguel  has a past surgical history that includes Spine surgery (1995); Right elbow (1988); Colonoscopy (2014); Esophagogastroduodenoscopy (N/A, 12/27/2023); Colonoscopy (N/A, 12/27/2023); and Esophagogastroduodenoscopy (N/A, 3/26/2024).    Medications:   Jerman has a current medication list which includes the following prescription(s): amlodipine, cholecalciferol (vitamin d3), ferrous sulfate, hydroxyzine pamoate, and lisinopril.    Allergies:   Review of patient's allergies indicates:  No Known Allergies     Objective     Posture: Fair  Palpation: mild generalized muscle tenderness  Sensation: intact to light touch      Range of Motion/Strength:     Knee Left Right Pain/Dysfunction with Movement   AROM      Knee Flexion (140)  98 °   WFL °     Knee Extension (0)  -5 °   WFL °           RLE 5/5 4+/5 4/5 4-/5 3+/5 3/5 3-/5 2+/5 2/5 2-/5 1/5 0 NT   Hip Flexion  x                   Hip Extension  x                   Hip Abduction  x                   Hip Adduction  x                   Hip Internal Rotation  x                   Hip External Rotation  x                   Knee Flexion  x                   Knee Extension  x                   Ankle Dorsiflexion  x                   Ankle Plantarflexion  x                      LLE 5/5 4+/5 4/5 4-/5 3+/5 3/5 3-/5 2+/5 2/5 2-/5 1/5 0 NT   Hip Flexion  x                        Hip Extension  x                        Hip Abduction  x                        Hip Adduction  x                        Hip Internal Rotation  x                        Hip External Rotation  x                        Knee Flexion          x                Knee Extension          x                Ankle Dorsiflexion  x                        Ankle  Plantarflexion  x                             Flexibility       90/90 Hamstrings  -25 ° -25 °      Gait Analysis   Jerman ambulated 50 feet with rolling walker device with independence assistance. Jerman displays antalgic and slow gait deviation during 1 gait cycle.      Other:   Sit to Stand - 9 Reps. Completed in 30 sec.        Intake Outcome Measure for FOTO Knee Survey    Therapist reviewed FOTO scores for Jerman Miguel on 4/8/2024.   FOTO report -  see Media section or FOTO account episode details.    Limitation Score: 66%           TREATMENT   Treatment Time In: 9:00  Treatment Time Out: 10:00  Total Treatment time (time-based codes) separate from Evaluation: 15 minutes      Jerman received the treatments listed below:      therapeutic exercises to develop strength, endurance, ROM, and flexibility for 15 minutes including:    QS x 30  SLR x 30  LAQs x 30  Seated HR/ Toe Ups x 30  Seated Marches x 30    Home Exercises and Patient Education Provided    Patient Education and Home Exercises     Education provided:   Patient was provided educational information regarding: role of Physical Therapy, short and long term goals, patient/therapist expectations, scheduling, and attendance policy.    Written Home Exercises Provided: yes. Exercises were reviewed and Jerman was able to demonstrate them prior to the end of the session.  Jerman demonstrated good  understanding of the education provided. See EMR under Patient Instructions for exercises provided during therapy sessions.    Assessment     Jerman is a 73 y.o. male referred to outpatient Physical Therapy with a medical diagnosis of Left Knee Pain following a TKA that was performed on 4/2/2024. Pt currently presents with LE   weakness, impaired endurance, impaired functional mobility, gait instability, impaired balance, decreased lower extremity function, pain, decreased ROM, edema, and impaired muscle length. He is a good candidate for OPPT to address  problem list.    Pt prognosis is Good.   Patient will benefit from skilled outpatient Physical Therapy to address the deficits stated above and in the chart below, provide patient /family education, and to maximize patientt's level of independence.     Plan of care discussed with patient: Yes  Patient's spiritual, cultural and educational needs considered and patient is agreeable to the plan of care and goals as stated below:     Anticipated Barriers for therapy: None Identified during initial evaluation     Medical Necessity is demonstrated by the following  History  Co-morbidities and personal factors that may impact the plan of care [] LOW: no personal factors / co-morbidities  [x] MODERATE: 1-2 personal factors / co-morbidities  [] HIGH: 3+ personal factors / co-morbidities    Moderate / High Support Documentation:   Co-morbidities affecting plan of care:   ----------------------------  Arthritis of right foot  HTN (hypertension)      Comment:  2014    Personal Factors:   no deficits     Examination  Body Structures and Functions, activity limitations and participation restrictions that may impact the plan of care [] LOW: addressing 1-2 elements  [x] MODERATE: 3+ elements  [] HIGH: 4+ elements (please support below)    Moderate / High Support Documentation:   ----------------------------  Arthritis of right foot  HTN (hypertension)      Comment:  2014   Clinical Presentation [] LOW: stable  [x] MODERATE: Evolving  [] HIGH: Unstable     Decision Making/ Complexity Score: moderate        Goals:         Short Term Goals: 3 weeks4/25/2024 Goal   Status    Pt. Will improve knee FOTO questionnaire to 85%      Pt. Will improve right knee flexion to 120 degrees     Pt. Will improve HS flexibility by demonstrating -2 degrees of knee extension 90/90 test position      Pt. Will improve LE strength grade to 4+/5 throughout            Long Term Goals: 6 weeks5/16/2024 Goal Status    Pt. Will improve knee FOTO questionnaire  to 100%    Pt. Will improve left knee flexion to WFL / pain free   Pt. Will improve HS flexibility by demonstrating 0 degrees of knee extension 90/90 test position        Pt. Will improve LE strength grade to 5/5 throughout      Pt. Will normalize gait pattern.      Pt. Will return to prior level of function      Pt. Will STS x 15x or better in 30 seconds.          Plan   Plan of care Certification: 4/8/2024 to  5/20/2024     Outpatient Physical Therapy 2 times weekly for 6 weeks to include the following interventions: Electrical Stimulation IFC, Manual Therapy, Moist Heat/ Ice, Neuromuscular Re-ed, Therapeutic Activities, Therapeutic Exercise, and Dry Needling .     Spencer Arriaga, PT,DPT  4/8/2024

## 2024-04-10 ENCOUNTER — CLINICAL SUPPORT (OUTPATIENT)
Dept: REHABILITATION | Facility: HOSPITAL | Age: 74
End: 2024-04-10
Payer: MEDICARE

## 2024-04-10 DIAGNOSIS — Z74.09 DECREASED FUNCTIONAL MOBILITY AND ENDURANCE: Primary | ICD-10-CM

## 2024-04-10 PROCEDURE — 97110 THERAPEUTIC EXERCISES: CPT | Mod: PN

## 2024-04-10 NOTE — PROGRESS NOTES
"[facility]   Physical Therapy Treatment Note        Name: Jerman GALINDO [facility]  Clinic Number: [id number]    Therapy Diagnosis:   Encounter Diagnosis   Name Primary?    Decreased functional mobility and endurance Yes     Physician: Hussain Wynne Jr., *    Visit Date: 4/10/2024       Physician Orders: PT Eval and Treat Left TKA  Medical Diagnosis from Referral: Left TKA  Evaluation Date: 4/4/2024  Authorization Period Expiration: 12/31/2024  Plan of Care Expiration: 5/16/2024 or 12 Visit  Progress Note Due: 5/4/2024  Visit # / Visits authorized: 2/ 20  Visits Remaining - 13  PTA visit #: 0/6        Eval Visit FOTO-  (4/4/2024 xx%)   5th Visit FOTO   -  (Date/Score)   10th Visit FOTO  -  (Date/Score)   D/C FOTO          -  (Date/Score)     Time In: 7:00  Time Out: 8:00  Billable Time: 30 minutes    Precautions: Standard  Insurance: [facility] [employer] [facility] / Plan: [employer] CHOICES / Product Type: Medicare Advantage /     Subjective     Pt reports: "I walked half a block yesterday".    He was compliant with home exercise program.  Response to previous treatment: positive  Functional change: Pt. has increased walking distance.    Pain: 2/10      Objective     Objective Measures Performed - Evaluation     Treatment      Jerman received the treatments listed below (Bold exercise performed during 4/10/2024 visit):        therapeutic exercises to develop strength, endurance, ROM, and flexibility for 60 minutes including:     QS x 30  SLR x 30  LAQs x 30  Seated HR/ Toe Ups x 30  Seated Marches x 30       Warm-up    Bike 10 min    Supine        Seated    LAQs x 30 3#    Standing      STS x 30  Squats x 30  Marches x 30  HR x 30  Hip Ab. x 30  Hip Ext. x 30  TKEs x 30  HS curls x 30 3#    manual therapy techniques: Joint mobilizations were applied to the: left knee for 00 minutes, including:    Manual Therapy  (amplitude and time)                             neuromuscular " re-education activities to improve: Balance, Coordination, and Proprioception for 00 minutes. The following activities were included:  Activities performed   (duration/resistance)                               therapeutic activities to improve functional performance for 00  minutes, including:    Activities performed   (duration/resistance)                             supervised modalities after being cleared for contradictions: IFC Electrical Stimulation:  Jerman received IFC Electrical Stimulation for pain control applied to the left knee.       Home Exercises Provided and Patient Education Provided     Education provided:   - Continue with HEP    Written Home Exercises Provided: yes. Exercises were reviewed and Jerman was able to demonstrate them prior to the end of the session.  Jerman demonstrated good  understanding of the education provided. See EMR under Patient Instructions for exercises provided during therapy sessions    Assessment     Pt tolerated PT treatment well with no c/o increased pain. Pt. Is improving gait tolerance and says that he is no longer using his RW. Pt will continue to benefit from skilled therapy to address deficits.  Pt performed today's therapeutic interventions without adverse events.  Pt educated to continue HEP to improve progression.     Jerman Is progressing well towards his goals.   Pt prognosis is Good.     Pt will continue to benefit from skilled outpatient physical therapy to address the deficits listed in the problem list box on initial evaluation, provide pt/family education and to maximize pt's level of independence in the home and community environment.     Pt's spiritual, cultural and educational needs considered and pt agreeable to plan of care and goals.    Anticipated barriers to physical therapy: none      Goals:         Short Term Goals: 3 weeks4/25/2024 Goal   Status    Pt. Will improve knee FOTO questionnaire to 85%      Pt. Will improve right knee flexion to 120  degrees     Pt. Will improve HS flexibility by demonstrating -2 degrees of knee extension 90/90 test position      Pt. Will improve LE strength grade to 4+/5 throughout            Long Term Goals: 6 weeks5/16/2024 Goal Status    Pt. Will improve knee FOTO questionnaire to 100%    Pt. Will improve left knee flexion to WFL / pain free   Pt. Will improve HS flexibility by demonstrating 0 degrees of knee extension 90/90 test position        Pt. Will improve LE strength grade to 5/5 throughout      Pt. Will normalize gait pattern.      Pt. Will return to prior level of function      Pt. Will STS x 15x or better in 30 seconds.          Plan     Continued with current POC      Spencer Arriaga, PT ,DPT  4/10/2024

## 2024-04-12 ENCOUNTER — CLINICAL SUPPORT (OUTPATIENT)
Dept: REHABILITATION | Facility: HOSPITAL | Age: 74
End: 2024-04-12
Payer: MEDICARE

## 2024-04-12 DIAGNOSIS — Z74.09 DECREASED FUNCTIONAL MOBILITY AND ENDURANCE: Primary | ICD-10-CM

## 2024-04-12 PROCEDURE — 97110 THERAPEUTIC EXERCISES: CPT | Mod: PN,CQ

## 2024-04-12 NOTE — PROGRESS NOTES
[facility]   Physical Therapy Treatment Note        [facility]  [facility] Number: [id number]    Therapy Diagnosis:   Encounter Diagnosis   Name Primary?    Decreased functional mobility and endurance Yes     Physician: Hussain Wynne Jr., *    Visit Date: 2024       Physician Orders: PT Eval and Treat Left TKA  Medical Diagnosis from Referral: Left TKA  Evaluation Date: 2024  Authorization Period Expiration: 2024  Plan of Care Expiration: 2024 or 12 Visit  Progress Note Due: 2024  Visit # / Visits authorized:   Visits Remaining - 13  PTA visit #: 0/6        Eval Visit FOTO-  (2024 xx%)   5th Visit FOTO   -  (Date/Score)   10th Visit FOTO  -  (Date/Score)   D/C FOTO          -  (Date/Score)     Time In: 1000  Time Out: 1052  Billable Time: 52 minutes; 33 min      Precautions: Standard  Insurance: Payor: [employer] [facility] / Plan: [employer] CHO[employer] / Product Type: Medicare Advantage /     Subjective     Pt states feeling okay with 2/10 pain in left knee     He was compliant with home exercise program.  Response to previous treatment: no adverse effects  Functional change: no change reported     Pain: 2/10      Objective     Objective Measures Performed -    2024:   TU seconds   Sit-Stand: 9 reps   Knee ext PROM 4 deg   Knee flexion PROM 100 deg     Treatment      Jerman received the treatments listed below (Bold exercise performed during 2024 visit):        therapeutic exercises to develop strength, endurance, ROM, and flexibility for 33 minutes 1 on  with PTA including:     QS x 30  SLR 3 x 10  LAQs 30 x  Seated HR/ Toe Ups x 30 4#  Seated Marches x 30       Warm-up    Bike 5 min to increase blood flow and ROM    Supine        Seated    LAQs x 30 3#    Standing      STS x 30  Marches 2 x 10 with two finger touch   TKEs x 30 w/ RTB   Squats 3 x 10  HR x 30  Hip Ab. x 30  Hip Ext. x 30    HS curls x 30  3#    manual therapy techniques: Joint mobilizations were applied to the: left knee for 00 minutes, including:    Manual Therapy  (amplitude and time)                             neuromuscular re-education activities to improve: Balance, Coordination, and Proprioception for 00 minutes. The following activities were included:  Activities performed   (duration/resistance)                               therapeutic activities to improve functional performance for 00  minutes, including:    Activities performed   (duration/resistance)                             supervised modalities after being cleared for contradictions: IFC Electrical Stimulation:  Jerman received IFC Electrical Stimulation for pain control applied to the left knee.       Home Exercises Provided and Patient Education Provided     Education provided:   - Pt edu on proper exercise technique.      Written Home Exercises Provided: yes. Exercises were reviewed and Jerman was able to demonstrate them prior to the end of the session.  Jerman demonstrated good  understanding of the education provided. See EMR under Patient Instructions for exercises provided during therapy sessions    Assessment   Pt showed increased strength and endurance during tx.  Pt cont to lack some knee ext and flexion.  Pt allan tx well.  Pn level remained same prior to and after tx.        Jerman Is progressing well towards his goals.   Pt prognosis is Good.     Pt will continue to benefit from skilled outpatient physical therapy to address the deficits listed in the problem list box on initial evaluation, provide pt/family education and to maximize pt's level of independence in the home and community environment.     Pt's spiritual, cultural and educational needs considered and pt agreeable to plan of care and goals.    Anticipated barriers to physical therapy: none      Goals:         Short Term Goals: 3 weeks4/25/2024 Goal   Status    Pt. Will improve knee FOTO questionnaire to 85%       Pt. Will improve right knee flexion to 120 degrees     Pt. Will improve HS flexibility by demonstrating -2 degrees of knee extension 90/90 test position      Pt. Will improve LE strength grade to 4+/5 throughout            Long Term Goals: 6 weeks5/16/2024 Goal Status    Pt. Will improve knee FOTO questionnaire to 100%    Pt. Will improve left knee flexion to WFL / pain free   Pt. Will improve HS flexibility by demonstrating 0 degrees of knee extension 90/90 test position        Pt. Will improve LE strength grade to 5/5 throughout      Pt. Will normalize gait pattern.      Pt. Will return to prior level of function      Pt. Will STS x 15x or better in 30 seconds.          Plan     Cont to progress towards goals set by PT.  Work to improve knee flex/ ext and quad strength next visit.        Narinder Tierney, PTA  4/12/2024

## 2024-04-15 ENCOUNTER — CLINICAL SUPPORT (OUTPATIENT)
Dept: REHABILITATION | Facility: HOSPITAL | Age: 74
End: 2024-04-15
Payer: MEDICARE

## 2024-04-15 DIAGNOSIS — Z74.09 DECREASED FUNCTIONAL MOBILITY AND ENDURANCE: Primary | ICD-10-CM

## 2024-04-15 PROCEDURE — 97110 THERAPEUTIC EXERCISES: CPT | Mod: PN

## 2024-04-15 NOTE — PROGRESS NOTES
"[facility]   Physical Therapy Treatment Note        Name: Jerman Rogersjennifer  Clinic Number: [id number]    Therapy Diagnosis:   Encounter Diagnosis   Name Primary?    Decreased functional mobility and endurance Yes     Physician: Hussain Wynne Jr., *    Visit Date: 4/15/2024       Physician Orders: PT Eval and Treat Left TKA  Medical Diagnosis from Referral: Left TKA  Evaluation Date: 2024  Authorization Period Expiration: 2024  Plan of Care Expiration: 2024 or 12 Visit  Progress Note Due: 2024  Visit # / Visits authorized:   Visits Remaining - 11  PTA visit #: 0/6        Eval Visit FOTO-  (2024 xx%)   5th Visit FOTO   -  (Date/Score)   10th Visit FOTO  -  (Date/Score)   D/C FOTO          -  (Date/Score)     Time In: 800  Time Out: 900  Billable Time: 60 minutes; 30 min     Precautions: Standard  Insurance: Payor: [employer] [facility] / Plan: [employer] CHOICES / Product Type: Medicare Advantage /     Subjective     Pt notes swelling in left foot but otherwise feels "pretty good"    He was compliant with home exercise program.  Response to previous treatment: no adverse effects  Functional change: no change reported     Pain: 2/10      Objective     Objective Measures Performed -    2024:   TU seconds   Sit-Stand: 9 reps   Knee ext PROM 4 deg   Knee flexion PROM 100 deg     Treatment      Jerman received the treatments listed below (Bold exercise performed during 4/15/2024 visit):        therapeutic exercises to develop strength, endurance, ROM, and flexibility for 60 minutes      QS x 30  SLR 3 x 10  LAQs 30 x  Seated HR/ Toe Ups x 30 4#  Seated Marches x 30       Warm-up    Bike 10 min to increase blood flow and ROM    Supine        Seated    LAQs x 30 3#    Standing      STS x 30  Marches 2 x 10 with two finger touch   TKEs x 30 w/ RTB   Squats 3 x 10  HR x 30  Hip Ab. x 30  Hip Ext. x 30  HS curls x 30 3#    manual therapy " techniques: Joint mobilizations were applied to the: left knee for 00 minutes, including:    Manual Therapy  (amplitude and time)                             neuromuscular re-education activities to improve: Balance, Coordination, and Proprioception for 00 minutes. The following activities were included:  Activities performed   (duration/resistance)                               therapeutic activities to improve functional performance for 00  minutes, including:    Activities performed   (duration/resistance)                             supervised modalities after being cleared for contradictions: IFC Electrical Stimulation:  Jerman received IFC Electrical Stimulation for pain control applied to the left knee.       Home Exercises Provided and Patient Education Provided     Education provided:   - Pt edu on proper exercise technique.      Written Home Exercises Provided: yes. Exercises were reviewed and Jerman was able to demonstrate them prior to the end of the session.  Jerman demonstrated good  understanding of the education provided. See EMR under Patient Instructions for exercises provided during therapy sessions    Assessment   Pt showed increased strength and endurance during tx.  Pt cont to lack some knee ext and flexion.  Pt allan tx well.  Pn level remained same prior to and after tx.        Jerman Is progressing well towards his goals.   Pt prognosis is Good.     Pt will continue to benefit from skilled outpatient physical therapy to address the deficits listed in the problem list box on initial evaluation, provide pt/family education and to maximize pt's level of independence in the home and community environment.     Pt's spiritual, cultural and educational needs considered and pt agreeable to plan of care and goals.    Anticipated barriers to physical therapy: none      Goals:         Short Term Goals: 3 weeks4/25/2024 Goal   Status    Pt. Will improve knee FOTO questionnaire to 85%      Pt. Will improve  right knee flexion to 120 degrees     Pt. Will improve HS flexibility by demonstrating -2 degrees of knee extension 90/90 test position      Pt. Will improve LE strength grade to 4+/5 throughout            Long Term Goals: 6 weeks5/16/2024 Goal Status    Pt. Will improve knee FOTO questionnaire to 100%    Pt. Will improve left knee flexion to WFL / pain free   Pt. Will improve HS flexibility by demonstrating 0 degrees of knee extension 90/90 test position        Pt. Will improve LE strength grade to 5/5 throughout      Pt. Will normalize gait pattern.      Pt. Will return to prior level of function      Pt. Will STS x 15x or better in 30 seconds.          Plan     Cont to progress towards goals set by PT.  Work to improve knee flex/ ext and quad strength next visit.        Spencer Arriaga, PT  4/15/2024

## 2024-04-19 ENCOUNTER — CLINICAL SUPPORT (OUTPATIENT)
Dept: REHABILITATION | Facility: HOSPITAL | Age: 74
End: 2024-04-19
Payer: MEDICARE

## 2024-04-19 DIAGNOSIS — Z74.09 DECREASED FUNCTIONAL MOBILITY AND ENDURANCE: Primary | ICD-10-CM

## 2024-04-19 PROCEDURE — 97110 THERAPEUTIC EXERCISES: CPT | Mod: PN

## 2024-04-19 NOTE — PROGRESS NOTES
"[facility]   Physical Therapy Treatment Note        Name: Jerman Rogersjennifer  Clinic Number: [id number]    Therapy Diagnosis:   Encounter Diagnosis   Name Primary?    Decreased functional mobility and endurance Yes     Physician: Hussain Wynne Jr., *    Visit Date: 2024       Physician Orders: PT Eval and Treat Left TKA  Medical Diagnosis from Referral: Left TKA  Evaluation Date: 2024  Authorization Period Expiration: 2024  Plan of Care Expiration: 2024 or 12 Visit  Progress Note Due: 2024  Visit # / Visits authorized:   Visits Remaining - 11  PTA visit #: 0/6        Eval Visit FOTO-  (2024 xx%)   5th Visit FOTO   -  (Date/Score)   10th Visit FOTO  -  (Date/Score)   D/C FOTO          -  (Date/Score)     Time In: 800  Time Out: 900  Billable Time: 60 minutes; 30 min     Precautions: Standard  Insurance: Payor: [employer] [facility] / Plan: [employer] CHOICES / Product Type: Medicare Advantage /     Subjective     Pt notes swelling in left foot but otherwise feels "pretty good"    He was compliant with home exercise program.  Response to previous treatment: no adverse effects  Functional change: no change reported     Pain: 2/10      Objective     Objective Measures Performed -    2024:   TU seconds   Sit-Stand: 9 reps   Knee ext PROM 4 deg   Knee flexion PROM 100 deg     Treatment      Jerman received the treatments listed below (Bold exercise performed during 2024 visit):        therapeutic exercises to develop strength, endurance, ROM, and flexibility for 60 minutes      QS x 30  SLR 3 x 10  LAQs 30 x  Seated HR/ Toe Ups x 30 4#  Seated Marches x 30       Warm-up    Bike 10 min to increase blood flow and ROM    Supine        Seated    LAQs x 30 3#    Standing      STS x 30  Marches 2 x 10 with two finger touch   TKEs x 30 w/ RTB   Squats 3 x 10  HR x 30  Hip Ab. x 30  Hip Ext. x 30  HS curls x 30 3#    manual therapy " techniques: Joint mobilizations were applied to the: left knee for 00 minutes, including:    Manual Therapy  (amplitude and time)                             neuromuscular re-education activities to improve: Balance, Coordination, and Proprioception for 00 minutes. The following activities were included:  Activities performed   (duration/resistance)                               therapeutic activities to improve functional performance for 00  minutes, including:    Activities performed   (duration/resistance)                             supervised modalities after being cleared for contradictions: IFC Electrical Stimulation:  Jerman received IFC Electrical Stimulation for pain control applied to the left knee.       Home Exercises Provided and Patient Education Provided     Education provided:   - Pt edu on proper exercise technique.      Written Home Exercises Provided: yes. Exercises were reviewed and Jerman was able to demonstrate them prior to the end of the session.  Jerman demonstrated good  understanding of the education provided. See EMR under Patient Instructions for exercises provided during therapy sessions    Assessment   Pt showed increased strength and endurance during tx.  Pt cont to lack some knee ext and flexion.  Pt allan tx well.  Pn level remained same prior to and after tx.        Jerman Is progressing well towards his goals.   Pt prognosis is Good.     Pt will continue to benefit from skilled outpatient physical therapy to address the deficits listed in the problem list box on initial evaluation, provide pt/family education and to maximize pt's level of independence in the home and community environment.     Pt's spiritual, cultural and educational needs considered and pt agreeable to plan of care and goals.    Anticipated barriers to physical therapy: none      Goals:         Short Term Goals: 3 weeks4/25/2024 Goal   Status    Pt. Will improve knee FOTO questionnaire to 85%      Pt. Will improve  right knee flexion to 120 degrees     Pt. Will improve HS flexibility by demonstrating -2 degrees of knee extension 90/90 test position      Pt. Will improve LE strength grade to 4+/5 throughout            Long Term Goals: 6 weeks5/16/2024 Goal Status    Pt. Will improve knee FOTO questionnaire to 100%    Pt. Will improve left knee flexion to WFL / pain free   Pt. Will improve HS flexibility by demonstrating 0 degrees of knee extension 90/90 test position        Pt. Will improve LE strength grade to 5/5 throughout      Pt. Will normalize gait pattern.      Pt. Will return to prior level of function      Pt. Will STS x 15x or better in 30 seconds.          Plan     Cont to progress towards goals set by PT.  Work to improve knee flex/ ext and quad strength next visit.        Melinda Donaldson, PTA  4/19/2024

## 2024-04-19 NOTE — PROGRESS NOTES
[facility]   Physical Therapy Treatment Note        [facility]  [facility] [id number]    Therapy Diagnosis:   Encounter Diagnosis   Name Primary?    Decreased functional mobility and endurance Yes     Physician: Hussain Wynne Jr., *    Visit Date: 2024       Physician Orders: PT Eval and Treat Left TKA  Medical Diagnosis from Referral: Left TKA  Evaluation Date: 2024  Authorization Period Expiration: 2024  Plan of Care Expiration: 2024 or 12 Visit  Progress Note Due: 2024  Visit # / Visits authorized:   Visits Remaining - 9  PTA visit #: 0/6        Eval Visit FOTO-  (2024 xx%)   5th Visit FOTO   -  (Date/Score)   10th Visit FOTO  -  (Date/Score)   D/C FOTO          -  (Date/Score)     Time In: 800  Time Out: 900  Billable Time: 60 minutes; 60 min     Precautions: Standard  Insurance: Payor: [employer] [facility] / Plan: [employer] / Product Type: Medicare Advantage /     Subjective     Pt notes swelling and pain in left knee following his last PT visit.     He was compliant with home exercise program.  Response to previous treatment: no adverse effects  Functional change: no change reported     Pain: 6/10      Objective     Objective Measures Performed -    2024:   TU seconds   Sit-Stand: 9 reps   Knee ext PROM 4 deg   Knee flexion PROM 100 deg     Treatment      Jerman received the treatments listed below (Bold exercise performed during 2024 visit):        therapeutic exercises to develop strength, endurance, ROM, and flexibility for 60 minutes      QS x 30  SLR 3 x 10  LAQs 30 x  Seated HR/ Toe Ups x 30 4#  Seated Marches x 30       Warm-up    Bike 10 min to increase blood flow and ROM    Supine        Seated    LAQs x 30 3#    Standing      STS x 30  Marches 2 x 10 with two finger touch   TKEs x 30 w/ RTB   Squats 3 x 10  HR x 30  Hip Ab. x 30  Hip Ext. x 30  HS curls x 30 3#    manual therapy  techniques: Joint mobilizations were applied to the: left knee for 00 minutes, including:    Manual Therapy  (amplitude and time)                             neuromuscular re-education activities to improve: Balance, Coordination, and Proprioception for 00 minutes. The following activities were included:  Activities performed   (duration/resistance)                               therapeutic activities to improve functional performance for 00  minutes, including:    Activities performed   (duration/resistance)                             supervised modalities after being cleared for contradictions: IFC Electrical Stimulation:  Jerman received IFC Electrical Stimulation for pain control applied to the left knee.       Home Exercises Provided and Patient Education Provided     Education provided:   - Pt edu on proper exercise technique.      Written Home Exercises Provided: yes. Exercises were reviewed and Jerman was able to demonstrate them prior to the end of the session.  Jerman demonstrated good  understanding of the education provided. See EMR under Patient Instructions for exercises provided during therapy sessions    Assessment   Pt presented today w/ significant increase in left knee pain and swelling following his last PT visit. Pt. demonstrated Mod antalgic gait pattern. Pt. OPPT routine was modified per pt symptoms.      Jerman Is progressing well towards his goals.   Pt prognosis is Good.     Pt will continue to benefit from skilled outpatient physical therapy to address the deficits listed in the problem list box on initial evaluation, provide pt/family education and to maximize pt's level of independence in the home and community environment.     Pt's spiritual, cultural and educational needs considered and pt agreeable to plan of care and goals.    Anticipated barriers to physical therapy: none      Goals:         Short Term Goals: 3 weeks4/25/2024 Goal   Status    Pt. Will improve knee FOTO  questionnaire to 85%      Pt. Will improve right knee flexion to 120 degrees     Pt. Will improve HS flexibility by demonstrating -2 degrees of knee extension 90/90 test position      Pt. Will improve LE strength grade to 4+/5 throughout            Long Term Goals: 6 weeks5/16/2024 Goal Status    Pt. Will improve knee FOTO questionnaire to 100%    Pt. Will improve left knee flexion to WFL / pain free   Pt. Will improve HS flexibility by demonstrating 0 degrees of knee extension 90/90 test position        Pt. Will improve LE strength grade to 5/5 throughout      Pt. Will normalize gait pattern.      Pt. Will return to prior level of function      Pt. Will STS x 15x or better in 30 seconds.          Plan     Cont to progress towards goals set by PT.  Work to improve knee flex/ ext and quad strength next visit.        Spencer Arriaga, PT  4/19/2024

## 2024-04-22 ENCOUNTER — CLINICAL SUPPORT (OUTPATIENT)
Dept: REHABILITATION | Facility: HOSPITAL | Age: 74
End: 2024-04-22
Payer: MEDICARE

## 2024-04-22 DIAGNOSIS — M25.562 CHRONIC PAIN OF LEFT KNEE: ICD-10-CM

## 2024-04-22 DIAGNOSIS — G89.29 CHRONIC PAIN OF LEFT KNEE: ICD-10-CM

## 2024-04-22 DIAGNOSIS — Z74.09 DECREASED FUNCTIONAL MOBILITY AND ENDURANCE: Primary | ICD-10-CM

## 2024-04-22 PROCEDURE — 97110 THERAPEUTIC EXERCISES: CPT | Mod: PN,CQ

## 2024-04-22 NOTE — PROGRESS NOTES
[facility]   Physical Therapy Treatment Note        [facility]  [facility] Number: [id number]    Therapy Diagnosis:   Encounter Diagnoses   Name Primary?    Decreased functional mobility and endurance Yes    Chronic pain of left knee      Physician: Hussain Wynne Jr., *    Visit Date: 2024       Physician Orders: PT Eval and Treat Left TKA  Medical Diagnosis from Referral: Left TKA  Evaluation Date: 2024  Authorization Period Expiration: 2024  Plan of Care Expiration: 2024 or 12 Visit  Progress Note Due: 2024  Visit # / Visits authorized:   Visits Remaining - 8  PTA visit #:         Eval Visit FOTO-  (2024 xx%)   5th Visit FOTO   -  (Date/Score)   10th Visit FOTO  -  (Date/Score)   D/C FOTO          -  (Date/Score)     Time In: 800  Time Out: 900  Billable Time: 60 minutes; 30 min     Precautions: Standard  Insurance: Payor: [employer] [facility]GD [facility] / Plan: [employer] / Product Type: Medicare Advantage /     Subjective     Pt notes swelling and pain in left knee following his last PT visit.     He was compliant with home exercise program.  Response to previous treatment: no adverse effects  Functional change: no change reported     Pain: 6/10      Objective     Objective Measures Performed -    2024:   TU seconds   Sit-Stand: 9 reps   Knee ext PROM 4 deg   Knee flexion PROM 100 deg     Treatment      Jerman received the treatments listed below (Bold exercise performed during 2024 visit):        therapeutic exercises to develop strength, endurance, ROM, and flexibility for 60 minutes      QS w/ heel propx 30  Heel prop w/ #5 for knee ext - 5'  Long sitting HS stretch 3x1'  SLR 3 x 10  LAQs 30 x  Seated HR/ Toe Ups x 30 4#  Seated Marches x 30       Warm-up    Bike 10 min to increase blood flow and ROM    Supine        Seated    LAQs x 30 3#    Standing      STS x 30  Marches 2 x 10 with two finger touch   TKEs  x 30 w/ RTB   Squats 3 x 10  HR x 30  Hip Ab. x 30  Hip Ext. x 30  HS curls x 30 3#  Calf stretch 3x1'    manual therapy techniques: Joint mobilizations were applied to the: left knee for 00 minutes, including:    Manual Therapy  (amplitude and time)                             neuromuscular re-education activities to improve: Balance, Coordination, and Proprioception for 00 minutes. The following activities were included:  Activities performed   (duration/resistance)                               therapeutic activities to improve functional performance for 00  minutes, including:    Activities performed   (duration/resistance)                             supervised modalities after being cleared for contradictions: IFC Electrical Stimulation:  Jerman received IFC Electrical Stimulation for pain control applied to the left knee.       Home Exercises Provided and Patient Education Provided     Education provided:   - Pt edu on proper exercise technique.      Written Home Exercises Provided: yes. Exercises were reviewed and Jerman was able to demonstrate them prior to the end of the session.  Jerman demonstrated good  understanding of the education provided. See EMR under Patient Instructions for exercises provided during therapy sessions    Assessment     Pt tolerated session well. Session focused on improving knee ext for ambulation. Heel prop challenging due to hamstring tightness. Ambulation improved at end of session with reduced gait deviation. Jerman had no adverse effects w/ exercises today and will continue to benefit from skilled therapy.     Jerman Is progressing well towards his goals.   Pt prognosis is Good.     Pt will continue to benefit from skilled outpatient physical therapy to address the deficits listed in the problem list box on initial evaluation, provide pt/family education and to maximize pt's level of independence in the home and community environment.     Pt's spiritual, cultural and  educational needs considered and pt agreeable to plan of care and goals.    Anticipated barriers to physical therapy: none      Goals:         Short Term Goals: 3 weeks4/25/2024 Goal   Status    Pt. Will improve knee FOTO questionnaire to 85%      Pt. Will improve right knee flexion to 120 degrees     Pt. Will improve HS flexibility by demonstrating -2 degrees of knee extension 90/90 test position      Pt. Will improve LE strength grade to 4+/5 throughout            Long Term Goals: 6 weeks5/16/2024 Goal Status    Pt. Will improve knee FOTO questionnaire to 100%    Pt. Will improve left knee flexion to WFL / pain free   Pt. Will improve HS flexibility by demonstrating 0 degrees of knee extension 90/90 test position        Pt. Will improve LE strength grade to 5/5 throughout      Pt. Will normalize gait pattern.      Pt. Will return to prior level of function      Pt. Will STS x 15x or better in 30 seconds.          Plan     Cont to progress towards goals set by PT.  Work to improve knee flex/ ext and quad strength next visit.        Melinda Donaldson, PTA  4/22/2024

## 2024-04-24 ENCOUNTER — CLINICAL SUPPORT (OUTPATIENT)
Dept: REHABILITATION | Facility: HOSPITAL | Age: 74
End: 2024-04-24
Payer: MEDICARE

## 2024-04-24 DIAGNOSIS — Z74.09 DECREASED FUNCTIONAL MOBILITY AND ENDURANCE: Primary | ICD-10-CM

## 2024-04-24 PROCEDURE — 97110 THERAPEUTIC EXERCISES: CPT | Mod: PN

## 2024-04-24 NOTE — PROGRESS NOTES
"[facility] OUTPATIENT THERAPY AND WELLNESS   Physical Therapy Treatment Note        Name: Jerman Rogersjennifer  Clinic Number: [id number]    Therapy Diagnosis:   Encounter Diagnosis   Name Primary?    Decreased functional mobility and endurance Yes     Physician: Hussain Wynne Jr., *    Visit Date: 2024       Physician Orders: PT Eval and Treat Left TKA  Medical Diagnosis from Referral: Left TKA  Evaluation Date: 2024  Authorization Period Expiration: 2024  Plan of Care Expiration: 2024 or 12 Visit  Progress Note Due: 2024  Visit # / Visits authorized:   Visits Remaining - 7  PTA visit #: 0/6        Eval Visit FOTO-  (2024 xx%)   5th Visit FOTO   -  (Date/Score)   10th Visit FOTO  -  (Date/Score)   D/C FOTO          -  (Date/Score)     Time In: 800  Time Out: 900  Billable Time: 60 minutes; 30 min     Precautions: Standard  Insurance: [facility] [employer] [facility] / Plan: [employer] / Product Type: Medicare Advantage /     Subjective     " Everything feels OK today"    He was compliant with home exercise program.  Response to previous treatment: no adverse effects  Functional change: no change reported     Pain: 2/10      Objective     Objective Measures Performed -    2024:   TU seconds   Sit-Stand: 9 reps   Knee ext PROM 4 deg   Knee flexion PROM 100 deg     Treatment      Jerman received the treatments listed below (Bold exercise performed during 2024 visit):        therapeutic exercises to develop strength, endurance, ROM, and flexibility for 60 minutes      QS w/ heel prop 30  Heel prop w/ #5 for knee ext - 5'  Long sitting HS stretch 3x1'  SLR 3 x 10  LAQs 30 x  Seated HR/ Toe Ups x 30 4#  Seated Marches x 30       Warm-up    Bike 10 min to increase blood flow and ROM    Supine        Seated    LAQs x 30 3#    Standing      STS x 30  Marches 2 x 10 with two finger touch   TKEs x 30 w/ RTB   Squats 3 x 10  HR x 30  Hip Ab. x 30  Hip Ext. x 30  HS " curls x 30 3#  Calf stretch 3x1'    manual therapy techniques: Joint mobilizations were applied to the: left knee for 00 minutes, including:    Manual Therapy  (amplitude and time)                             neuromuscular re-education activities to improve: Balance, Coordination, and Proprioception for 00 minutes. The following activities were included:  Activities performed   (duration/resistance)                               therapeutic activities to improve functional performance for 00  minutes, including:    Activities performed   (duration/resistance)                             supervised modalities after being cleared for contradictions: IFC Electrical Stimulation:  Jerman received IFC Electrical Stimulation for pain control applied to the left knee.       Home Exercises Provided and Patient Education Provided     Education provided:   - Pt edu on proper exercise technique.      Written Home Exercises Provided: yes. Exercises were reviewed and Jerman was able to demonstrate them prior to the end of the session.  Jerman demonstrated good  understanding of the education provided. See EMR under Patient Instructions for exercises provided during therapy sessions    Assessment     Pt tolerated session well. Heel prop remains challenging due to hamstring tightness. Pt. Appears to be responding well to OPPT treatments per Pt. Subjective Pain Rating. Jerman had no adverse effects w/ exercises today and will continue to benefit from skilled therapy.     Jerman Is progressing well towards his goals.   Pt prognosis is Good.     Pt will continue to benefit from skilled outpatient physical therapy to address the deficits listed in the problem list box on initial evaluation, provide pt/family education and to maximize pt's level of independence in the home and community environment.     Pt's spiritual, cultural and educational needs considered and pt agreeable to plan of care and goals.    Anticipated barriers to  physical therapy: none      Goals:         Short Term Goals: 3 weeks4/25/2024 Goal   Status    Pt. Will improve knee FOTO questionnaire to 85%      Pt. Will improve right knee flexion to 120 degrees     Pt. Will improve HS flexibility by demonstrating -2 degrees of knee extension 90/90 test position      Pt. Will improve LE strength grade to 4+/5 throughout            Long Term Goals: 6 weeks5/16/2024 Goal Status    Pt. Will improve knee FOTO questionnaire to 100%    Pt. Will improve left knee flexion to WFL / pain free   Pt. Will improve HS flexibility by demonstrating 0 degrees of knee extension 90/90 test position        Pt. Will improve LE strength grade to 5/5 throughout      Pt. Will normalize gait pattern.      Pt. Will return to prior level of function      Pt. Will STS x 15x or better in 30 seconds.          Plan     Cont to progress towards goals set by PT.  Work to improve knee flex/ ext and quad strength next visit.        Spencer Arriaga, PT  4/24/2024

## 2024-04-29 ENCOUNTER — CLINICAL SUPPORT (OUTPATIENT)
Dept: REHABILITATION | Facility: HOSPITAL | Age: 74
End: 2024-04-29
Payer: MEDICARE

## 2024-04-29 DIAGNOSIS — Z74.09 DECREASED FUNCTIONAL MOBILITY AND ENDURANCE: Primary | ICD-10-CM

## 2024-04-29 PROCEDURE — 97110 THERAPEUTIC EXERCISES: CPT | Mod: PN,CQ

## 2024-04-29 NOTE — PROGRESS NOTES
[facility]   Physical Therapy Treatment Note        Name: Jerman Miguel  Clinic Number: [id number]    Therapy Diagnosis:   Encounter Diagnosis   Name Primary?    Decreased functional mobility and endurance Yes     Physician: Hussain Wynne Jr., *    Visit Date: 2024       Physician Orders: PT Eval and Treat Left TKA  Medical Diagnosis from Referral: Left TKA  Evaluation Date: 2024  Authorization Period Expiration: 2024  Plan of Care Expiration: 2024 or 12 Visit  Progress Note Due: 2024  Visit # / Visits authorized:   Visits Remaining - 6  PTA visit #:         Eval Visit FOTO-  (2024 xx%)   5th Visit FOTO   -  (Date/Score)   10th Visit FOTO  -  (Date/Score)   D/C FOTO          -  (Date/Score)     Time In: 800  Time Out: 900  Billable Time: 60 minutes; 30 min     Precautions: Standard  Insurance: Payor: [employer] [facility] / Plan: [employer] CHOICES / Product Type: Medicare Advantage /     Subjective     Pt Stated: He's feeling better today, medication made him dizzy last week    He was compliant with home exercise program.  Response to previous treatment: no adverse effects  Functional change: no change reported     Pain: 2/10      Objective     Objective Measures Performed -    2024:   TU seconds   Sit-Stand: 9 reps   Knee ext PROM 4 deg   Knee flexion PROM 100 deg     Treatment      Jerman received the treatments listed below (Bold exercise performed during 2024 visit):        therapeutic exercises to develop strength, endurance, ROM, and flexibility for 60 minutes      QS w/ heel prop 30  Heel slides - 5'  Heel prop for knee ext - 5'  Long sitting HS stretch 3x1'  SLR 3 x 10  LAQs 30 x  Seated HR/ Toe Ups x 30 4#  Seated Marches x 30   Shuttle #[id number]  Shuttle heel raises #25 2x10  Single leg shuttle #12      Warm-up    Bike 10 min to increase blood flow and ROM    Supine        Seated    LAQs x 30 3#    Standing       STS x 30  Marches 2 x 10 with two finger touch   TKEs x 30 w/ RTB   Squats 3 x 10  HR x 30  Hip Ab. x 30  Hip Ext. x 30  HS curls x 30 3#  Calf stretch 3x1'    manual therapy techniques: Joint mobilizations were applied to the: left knee for 00 minutes, including:    Manual Therapy  (amplitude and time)                             neuromuscular re-education activities to improve: Balance, Coordination, and Proprioception for 00 minutes. The following activities were included:  Activities performed   (duration/resistance)                               therapeutic activities to improve functional performance for 00  minutes, including:    Activities performed   (duration/resistance)                             supervised modalities after being cleared for contradictions: IFC Electrical Stimulation:  Jerman received IFC Electrical Stimulation for pain control applied to the left knee.       Home Exercises Provided and Patient Education Provided     Education provided:   - Pt edu on proper exercise technique.      Written Home Exercises Provided: yes. Exercises were reviewed and Jerman was able to demonstrate them prior to the end of the session.  Jerman demonstrated good  understanding of the education provided. See EMR under Patient Instructions for exercises provided during therapy sessions    Assessment     Pt tolerated session well. Shuttle added to strengthen quad and improve Closed Chain knee ext. Pt reports improved tolerance w/  heel prop. No adverse effects w/ new exercises, Jerman will continue to benefit from skilled therapy.     Jerman Is progressing well towards his goals.   Pt prognosis is Good.     Pt will continue to benefit from skilled outpatient physical therapy to address the deficits listed in the problem list box on initial evaluation, provide pt/family education and to maximize pt's level of independence in the home and community environment.     Pt's spiritual, cultural and educational needs  considered and pt agreeable to plan of care and goals.    Anticipated barriers to physical therapy: none      Goals:         Short Term Goals: 3 weeks4/25/2024 Goal   Status    Pt. Will improve knee FOTO questionnaire to 85%      Pt. Will improve right knee flexion to 120 degrees     Pt. Will improve HS flexibility by demonstrating -2 degrees of knee extension 90/90 test position      Pt. Will improve LE strength grade to 4+/5 throughout            Long Term Goals: 6 weeks5/16/2024 Goal Status    Pt. Will improve knee FOTO questionnaire to 100%    Pt. Will improve left knee flexion to WFL / pain free   Pt. Will improve HS flexibility by demonstrating 0 degrees of knee extension 90/90 test position        Pt. Will improve LE strength grade to 5/5 throughout      Pt. Will normalize gait pattern.      Pt. Will return to prior level of function      Pt. Will STS x 15x or better in 30 seconds.          Plan     Cont to progress towards goals set by PT.  Work to improve knee flex/ ext and quad strength next visit.        Melinda Donaldson, PTA  4/29/2024

## 2024-05-01 ENCOUNTER — CLINICAL SUPPORT (OUTPATIENT)
Dept: REHABILITATION | Facility: HOSPITAL | Age: 74
End: 2024-05-01
Payer: MEDICARE

## 2024-05-01 DIAGNOSIS — Z74.09 DECREASED FUNCTIONAL MOBILITY AND ENDURANCE: Primary | ICD-10-CM

## 2024-05-01 PROCEDURE — 97110 THERAPEUTIC EXERCISES: CPT | Mod: PN

## 2024-05-01 NOTE — PROGRESS NOTES
"[facility]PAT[facility] THERAPY [facility] WELL[facility]   Physical Therapy Treatment Note        [facility]  [facility] Number: [id number]    Therapy Diagnosis:   Encounter Diagnosis   Name Primary?    Decreased functional mobility and endurance Yes     Physician: Hussain Wynne Jr., *    Visit Date: 2024       Physician Orders: PT Eval and Treat Left TKA  Medical Diagnosis from Referral: Left TKA  Evaluation Date: 2024  Authorization Period Expiration: 2024  Plan of Care Expiration: 2024 or 12 Visit  Progress Note Due: 2024  Visit # / Visits authorized:   Visits Remaining - 4  PTA visit #: 0/6        Eval Visit FOTO-  (2024 xx%)   5th Visit FOTO   -  (Date/Score)   10th Visit FOTO  -  (Date/Score)   D/C FOTO          -  (Date/Score)     Time In: 800  Time Out: 900  Billable Time: 60 minutes; 30 min     Precautions: Standard  Insurance: Payor: [employer] [facility] / Plan: [employer] CHOICES / Product Type: Medicare Advantage /     Subjective     Pt Stated: "I feel good and have no pain"    He was compliant with home exercise program.  Response to previous treatment: no adverse effects  Functional change: no change reported     Pain: 0/10      Objective     Objective Measures Performed -    2024:   TU seconds   Sit-Stand: 9 reps   Knee ext PROM 4 deg   Knee flexion PROM 100 deg     Treatment      Jerman received the treatments listed below (Bold exercise performed during 2024 visit):        therapeutic exercises to develop strength, endurance, ROM, and flexibility for 60 minutes      QS w/ heel prop 30  Heel slides - 5'  Heel prop for knee ext - 5'  Long sitting HS stretch 3x1'  SLR 3 x 10  LAQs 30 x  Seated HR/ Toe Ups x 30 4#  Seated Marches x 30   Shuttle #[id number]  Shuttle heel raises #25 2x10  Single leg shuttle #12      Warm-up    Bike 10 min to increase blood flow and ROM    Supine        Seated    LAQs x 30 3#    Standing      STS x 30  Marches 2 x 10 with " two finger touch   TKEs x 30 w/ RTB   Squats 3 x 10  HR x 30  Hip Ab. x 30  Hip Ext. x 30  HS curls x 30 3#  Calf stretch 3x1'    manual therapy techniques: Joint mobilizations were applied to the: left knee for 00 minutes, including:    Manual Therapy  (amplitude and time)                             neuromuscular re-education activities to improve: Balance, Coordination, and Proprioception for 00 minutes. The following activities were included:  Activities performed   (duration/resistance)                               therapeutic activities to improve functional performance for 00  minutes, including:    Activities performed   (duration/resistance)                             supervised modalities after being cleared for contradictions: IFC Electrical Stimulation:  Jerman received IFC Electrical Stimulation for pain control applied to the left knee.       Home Exercises Provided and Patient Education Provided     Education provided:   - Pt edu on proper exercise technique.      Written Home Exercises Provided: yes. Exercises were reviewed and Jerman was able to demonstrate them prior to the end of the session.  Jerman demonstrated good  understanding of the education provided. See EMR under Patient Instructions for exercises provided during therapy sessions    Assessment     Pt tolerated session well and reported a 0/10 pain rating. Pt reports improved tolerance w/  heel prop. No adverse effects w/ new exercises, Jerman will continue to benefit from skilled therapy.     Jerman Is progressing well towards his goals.   Pt prognosis is Good.     Pt will continue to benefit from skilled outpatient physical therapy to address the deficits listed in the problem list box on initial evaluation, provide pt/family education and to maximize pt's level of independence in the home and community environment.     Pt's spiritual, cultural and educational needs considered and pt agreeable to plan of care and goals.    Anticipated  barriers to physical therapy: none      Goals:         Short Term Goals: 3 weeks4/25/2024 Goal   Status    Pt. Will improve knee FOTO questionnaire to 85%      Pt. Will improve right knee flexion to 120 degrees     Pt. Will improve HS flexibility by demonstrating -2 degrees of knee extension 90/90 test position      Pt. Will improve LE strength grade to 4+/5 throughout            Long Term Goals: 6 weeks5/16/2024 Goal Status    Pt. Will improve knee FOTO questionnaire to 100%    Pt. Will improve left knee flexion to WFL / pain free   Pt. Will improve HS flexibility by demonstrating 0 degrees of knee extension 90/90 test position        Pt. Will improve LE strength grade to 5/5 throughout      Pt. Will normalize gait pattern.      Pt. Will return to prior level of function      Pt. Will STS x 15x or better in 30 seconds.          Plan     Cont to progress towards goals set by PT.  Work to improve knee flex/ ext and quad strength next visit.        Spencer Arriaga, PT  5/1/2024

## 2024-05-06 ENCOUNTER — CLINICAL SUPPORT (OUTPATIENT)
Dept: REHABILITATION | Facility: HOSPITAL | Age: 74
End: 2024-05-06
Payer: MEDICARE

## 2024-05-06 DIAGNOSIS — Z74.09 DECREASED FUNCTIONAL MOBILITY AND ENDURANCE: Primary | ICD-10-CM

## 2024-05-06 PROCEDURE — 97110 THERAPEUTIC EXERCISES: CPT | Mod: PN

## 2024-05-06 NOTE — PROGRESS NOTES
"[facility]   Physical Therapy Treatment Note        [facility]  [facility] Number: [id number]    Therapy Diagnosis:   No diagnosis found.    Physician: Hussain Wynne Jr., *    Visit Date: 2024       Physician Orders: PT Eval and Treat Left TKA  Medical Diagnosis from Referral: Left TKA  Evaluation Date: 2024  Authorization Period Expiration: 2024  Plan of Care Expiration: 2024 or 12 Visit  Progress Note Due: 2024  Visit # / Visits authorized:   Visits Remaining - 4  PTA visit #: 0/6        Eval Visit FOTO-  (2024 xx%)   5th Visit FOTO   -  (Date/Score)   10th Visit FOTO  -  (Date/Score)   D/C FOTO          -  (Date/Score)     Time In: 800  Time Out: 900  Billable Time: 60 minutes; 30 min     Precautions: Standard  Insurance: Payor: [employer] [facility] / Plan: [employer] CHOICES / Product Type: Medicare Advantage /     Subjective     Pt Stated: "I may have overdone it a little bit over the weekend".    He was compliant with home exercise program.  Response to previous treatment: no adverse effects  Functional change: no change reported     Pain: 4/10      Objective     Objective Measures Performed -    2024:   TU seconds   Sit-Stand: 9 reps   Knee ext PROM 4 deg   Knee flexion PROM 100 deg     Treatment      Jerman received the treatments listed below (Bold exercise performed during 2024 visit):        therapeutic exercises to develop strength, endurance, ROM, and flexibility for 60 minutes      QS w/ heel prop 30  Heel slides - 5'  Heel prop for knee ext - 5'  Long sitting HS stretch 3x1'  SLR 3 x 10  LAQs 30 x  Seated HR/ Toe Ups x 30 4#  Seated Marches x 30   Shuttle #50 3x10  Shuttle heel raises #25 2x10  Single leg shuttle #12      Warm-up    Bike 10 min to increase blood flow and ROM    Supine        Seated    LAQs x 30 5#    Standing      STS x 30  Marches 2 x 10 with two finger touch   TKEs x 30 w/ RTB "   Squats 3 x 10  HR x 30  Hip Ab. x 30   Hip Ext. x 30  HS curls x 30 5#  Calf stretch 3x1'    manual therapy techniques: Joint mobilizations were applied to the: left knee for 00 minutes, including:    Manual Therapy  (amplitude and time)                             neuromuscular re-education activities to improve: Balance, Coordination, and Proprioception for 00 minutes. The following activities were included:  Activities performed   (duration/resistance)                               therapeutic activities to improve functional performance for 00  minutes, including:    Activities performed   (duration/resistance)                             supervised modalities after being cleared for contradictions: IFC Electrical Stimulation:  Jerman received IFC Electrical Stimulation for pain control applied to the left knee.       Home Exercises Provided and Patient Education Provided     Education provided:   - Pt edu on proper exercise technique.      Written Home Exercises Provided: yes. Exercises were reviewed and Jerman was able to demonstrate them prior to the end of the session.  Jerman demonstrated good  understanding of the education provided. See EMR under Patient Instructions for exercises provided during therapy sessions    Assessment     Pt tolerated session well with no c/o increased pain. Pt. Continues to improve gait pattern/ gait tolerance. Pt reports improved tolerance w/  heel prop. No adverse effects w/ new exercises, Jerman will continue to benefit from skilled therapy.     Jerman Is progressing well towards his goals.   Pt prognosis is Good.     Pt will continue to benefit from skilled outpatient physical therapy to address the deficits listed in the problem list box on initial evaluation, provide pt/family education and to maximize pt's level of independence in the home and community environment.     Pt's spiritual, cultural and educational needs considered and pt agreeable to plan of care and  goals.    Anticipated barriers to physical therapy: none      Goals:         Short Term Goals: 3 weeks4/25/2024 Goal   Status    Pt. Will improve knee FOTO questionnaire to 85%      Pt. Will improve right knee flexion to 120 degrees     Pt. Will improve HS flexibility by demonstrating -2 degrees of knee extension 90/90 test position      Pt. Will improve LE strength grade to 4+/5 throughout            Long Term Goals: 6 weeks5/16/2024 Goal Status    Pt. Will improve knee FOTO questionnaire to 100%    Pt. Will improve left knee flexion to WFL / pain free   Pt. Will improve HS flexibility by demonstrating 0 degrees of knee extension 90/90 test position        Pt. Will improve LE strength grade to 5/5 throughout      Pt. Will normalize gait pattern.      Pt. Will return to prior level of function      Pt. Will STS x 15x or better in 30 seconds.          Plan     Cont to progress towards goals set by PT.  Work to improve knee flex/ ext and quad strength next visit.        Spencer Arriaga, PT  5/6/2024

## 2024-05-08 ENCOUNTER — CLINICAL SUPPORT (OUTPATIENT)
Dept: REHABILITATION | Facility: HOSPITAL | Age: 74
End: 2024-05-08
Payer: MEDICARE

## 2024-05-08 DIAGNOSIS — Z74.09 DECREASED FUNCTIONAL MOBILITY AND ENDURANCE: Primary | ICD-10-CM

## 2024-05-08 PROCEDURE — 97530 THERAPEUTIC ACTIVITIES: CPT | Mod: KX,PN,CQ

## 2024-05-08 PROCEDURE — 97110 THERAPEUTIC EXERCISES: CPT | Mod: KX,PN,CQ

## 2024-05-08 NOTE — PROGRESS NOTES
[facility]   Physical Therapy Treatment Note        [facility]  [facility] Number: [id number]    Therapy Diagnosis:   Encounter Diagnosis   Name Primary?    Decreased functional mobility and endurance Yes       Physician: Hussain Wynne Jr., *    Visit Date: 2024       Physician Orders: PT Eval and Treat Left TKA  Medical Diagnosis from Referral: Left TKA  Evaluation Date: 2024  Authorization Period Expiration: 2024  Plan of Care Expiration: 2024 or 12 Visit  Progress Note Due: 2024  Visit # / Visits authorized:   Visits Remaining - 3  PTA visit #:         Eval Visit FOTO-  (2024 xx%)   5th Visit FOTO   -  (Date/Score)   10th Visit FOTO  -  (Date/Score)   D/C FOTO          -  (Date/Score)     Time In: 800  Time Out: 900  Billable Time: 60 minutes; 30 min     Precautions: Standard  Insurance: [facility] [employer] [facility] / Plan: [employer] / Product Type: Medicare Advantage /     Subjective     Pt Stated: Knee is okay, some anterior pain.     He was compliant with home exercise program.  Response to previous treatment: no adverse effects  Functional change: no change reported     Pain: 4/10      Objective     Objective Measures Performed -    2024:   TU seconds   Sit-Stand: 9 reps   Knee ext PROM 4 deg   Knee flexion PROM 100 deg     Treatment      Jerman received the treatments listed below (Bold exercise performed during 2024 visit):        therapeutic exercises to develop strength, endurance, ROM, and flexibility for 60 minutes    QS w/ ball 3x10  QS w/ heel prop 30  Heel slides - 5'  Heel prop for knee ext - 5'  Long sitting HS stretch 3x1'  SLR 2x10 (focus on quad control)  LAQs 30 x  Seated HR/ Toe Ups x 30 4#  Seated Marches x 30   Shuttle #50 3x10  Shuttle heel raises #25 2x10  Single leg shuttle #12      Warm-up    Bike 10 min to increase blood flow and ROM    Supine    Sidelying hip abduction  "3x10    Seated    LAQs x 30 #5 (GTB used today)    Standing      Marches 2 x 10 with two finger touch   TKEs x 30 w/ RTB   Squats 3 x 10  HR x 30  Hip Ab. x 30   Hip Ext. x 30  HS curls x 30 5#  Calf stretch 3x1'      therapeutic activities to improve functional performance for 15  minutes, including:    Activities performed   (duration/resistance)     Step-ups 6"step Done   Long Arc Quad w/ ball  Done   Sit <>stands NP               supervised modalities after being cleared for contradictions: IFC Electrical Stimulation:  Jerman received IFC Electrical Stimulation for pain control applied to the left knee.       Home Exercises Provided and Patient Education Provided     Education provided:   - Pt edu on proper exercise technique.      Written Home Exercises Provided: yes. Exercises were reviewed and Jerman was able to demonstrate them prior to the end of the session.  Jerman demonstrated good  understanding of the education provided. See EMR under Patient Instructions for exercises provided during therapy sessions    Assessment     Pt tolerated session well. Session focused on improving knee ext, pt displays good passive knee ext but struggles to fully extend knee during ambulation. Step-ups added and GTB used with Long Arc Quad to improve quad strength. Pt had no adverse effects w/ exercises. He will benefit from gait training to improve knee extension w/ ambulation.    Jerman Is progressing well towards his goals.   Pt prognosis is Good.     Pt will continue to benefit from skilled outpatient physical therapy to address the deficits listed in the problem list box on initial evaluation, provide pt/family education and to maximize pt's level of independence in the home and community environment.     Pt's spiritual, cultural and educational needs considered and pt agreeable to plan of care and goals.    Anticipated barriers to physical therapy: none      Goals:         Short Term Goals: 3 weeks4/25/2024 Goal "   Status    Pt. Will improve knee FOTO questionnaire to 85%      Pt. Will improve right knee flexion to 120 degrees     Pt. Will improve HS flexibility by demonstrating -2 degrees of knee extension 90/90 test position      Pt. Will improve LE strength grade to 4+/5 throughout            Long Term Goals: 6 weeks5/16/2024 Goal Status    Pt. Will improve knee FOTO questionnaire to 100%    Pt. Will improve left knee flexion to WFL / pain free   Pt. Will improve HS flexibility by demonstrating 0 degrees of knee extension 90/90 test position        Pt. Will improve LE strength grade to 5/5 throughout      Pt. Will normalize gait pattern.      Pt. Will return to prior level of function      Pt. Will STS x 15x or better in 30 seconds.          Plan     Cont to progress towards goals set by PT.  Work to improve knee flex/ ext and quad strength next visit.        Melinda Donaldson, PTA  5/8/2024

## 2024-05-10 ENCOUNTER — DOCUMENTATION ONLY (OUTPATIENT)
Dept: REHABILITATION | Facility: HOSPITAL | Age: 74
End: 2024-05-10
Payer: MEDICARE

## 2024-05-10 NOTE — PROGRESS NOTES
PT/PTA met face to face to discuss pt's treatment plan and progress towards established goals. Pt will be seen by a physical therapist minimally every 6th visit or every 30 days.    Please see Updated Plan of Care for changes and updated goals.       Melinda Donaldson, PTA

## 2024-06-07 ENCOUNTER — PATIENT OUTREACH (OUTPATIENT)
Dept: ADMINISTRATIVE | Facility: HOSPITAL | Age: 74
End: 2024-06-07
Payer: MEDICARE

## 2024-11-15 ENCOUNTER — TELEPHONE (OUTPATIENT)
Dept: INTERNAL MEDICINE | Facility: CLINIC | Age: 74
End: 2024-11-15
Payer: MEDICARE

## 2024-11-15 DIAGNOSIS — H91.90 HEARING LOSS, UNSPECIFIED HEARING LOSS TYPE, UNSPECIFIED LATERALITY: Primary | ICD-10-CM

## 2024-11-15 NOTE — TELEPHONE ENCOUNTER
----- Message from Devendra sent at 11/14/2024  4:17 PM CST -----  Contact: Pt  .Type:  Patient Requesting Referral    Who Called:pt  Does the patient already have the specialty appointment scheduled?:  If yes, what is the date of that appointment?:  Referral to What Specialty:Audiologist  Reason for Referral: hearing test  Does the patient want the referral with a specific physician?:  [facility]  Patient Requesting a Response?:Yes  Would the patient rather a call back or a response via [facility]?  Call back  Best Call Back Number:[phone]  Additional Information: Pt. Needs to have a hearing test and [facility] require a referral.

## 2024-12-12 ENCOUNTER — LAB VISIT (OUTPATIENT)
Dept: LAB | Facility: OTHER | Age: 74
End: 2024-12-12
Attending: INTERNAL MEDICINE
Payer: MEDICARE

## 2024-12-12 ENCOUNTER — OFFICE VISIT (OUTPATIENT)
Dept: INTERNAL MEDICINE | Facility: CLINIC | Age: 74
End: 2024-12-12
Attending: INTERNAL MEDICINE
Payer: MEDICARE

## 2024-12-12 VITALS
HEIGHT: 71 IN | SYSTOLIC BLOOD PRESSURE: 128 MMHG | BODY MASS INDEX: 26.32 KG/M2 | OXYGEN SATURATION: 99 % | HEART RATE: 65 BPM | WEIGHT: 188 LBS | DIASTOLIC BLOOD PRESSURE: 76 MMHG

## 2024-12-12 DIAGNOSIS — I10 ESSENTIAL HYPERTENSION: Primary | ICD-10-CM

## 2024-12-12 DIAGNOSIS — D50.8 OTHER IRON DEFICIENCY ANEMIA: ICD-10-CM

## 2024-12-12 DIAGNOSIS — R79.89 OTHER SPECIFIED ABNORMAL FINDINGS OF BLOOD CHEMISTRY: ICD-10-CM

## 2024-12-12 DIAGNOSIS — H25.012 CORTICAL AGE-RELATED CATARACT OF LEFT EYE: ICD-10-CM

## 2024-12-12 DIAGNOSIS — Z00.01 ENCOUNTER FOR GENERAL ADULT MEDICAL EXAMINATION WITH ABNORMAL FINDINGS: ICD-10-CM

## 2024-12-12 DIAGNOSIS — E78.9 DISORDER OF LIPID METABOLISM: Primary | ICD-10-CM

## 2024-12-12 DIAGNOSIS — D51.0 PERNICIOUS ANEMIA: ICD-10-CM

## 2024-12-12 DIAGNOSIS — I70.0 AORTIC ATHEROSCLEROSIS: ICD-10-CM

## 2024-12-12 DIAGNOSIS — E78.9 DISORDER OF LIPID METABOLISM: ICD-10-CM

## 2024-12-12 DIAGNOSIS — E78.5 DYSLIPIDEMIA: ICD-10-CM

## 2024-12-12 DIAGNOSIS — E55.9 VITAMIN D DEFICIENCY: ICD-10-CM

## 2024-12-12 DIAGNOSIS — Z01.818 PRE-OP EVALUATION: ICD-10-CM

## 2024-12-12 DIAGNOSIS — D62 ACUTE BLOOD LOSS ANEMIA: ICD-10-CM

## 2024-12-12 DIAGNOSIS — E03.9 HYPOTHYROIDISM, UNSPECIFIED TYPE: ICD-10-CM

## 2024-12-12 DIAGNOSIS — Z12.5 SCREENING FOR PROSTATE CANCER: ICD-10-CM

## 2024-12-12 LAB
25(OH)D3+25(OH)D2 SERPL-MCNC: 41 NG/ML (ref 30–96)
ALBUMIN SERPL BCP-MCNC: 4.3 G/DL (ref 3.5–5.2)
ALP SERPL-CCNC: 65 U/L (ref 40–150)
ALT SERPL W/O P-5'-P-CCNC: 16 U/L (ref 10–44)
ANION GAP SERPL CALC-SCNC: 9 MMOL/L (ref 8–16)
AST SERPL-CCNC: 20 U/L (ref 10–40)
BASOPHILS # BLD AUTO: 0.08 K/UL (ref 0–0.2)
BASOPHILS NFR BLD: 1.1 % (ref 0–1.9)
BILIRUB SERPL-MCNC: 0.5 MG/DL (ref 0.1–1)
BUN SERPL-MCNC: 18 MG/DL (ref 8–23)
CALCIUM SERPL-MCNC: 9.7 MG/DL (ref 8.7–10.5)
CHLORIDE SERPL-SCNC: 104 MMOL/L (ref 95–110)
CHOLEST SERPL-MCNC: 211 MG/DL (ref 120–199)
CHOLEST/HDLC SERPL: 4.8 {RATIO} (ref 2–5)
CO2 SERPL-SCNC: 25 MMOL/L (ref 23–29)
COMPLEXED PSA SERPL-MCNC: 1.4 NG/ML (ref 0–4)
CREAT SERPL-MCNC: 1.1 MG/DL (ref 0.5–1.4)
DIFFERENTIAL METHOD BLD: ABNORMAL
EOSINOPHIL # BLD AUTO: 0.1 K/UL (ref 0–0.5)
EOSINOPHIL NFR BLD: 1.5 % (ref 0–8)
ERYTHROCYTE [DISTWIDTH] IN BLOOD BY AUTOMATED COUNT: 16.4 % (ref 11.5–14.5)
EST. GFR  (NO RACE VARIABLE): >60 ML/MIN/1.73 M^2
ESTIMATED AVG GLUCOSE: 108 MG/DL (ref 68–131)
GLUCOSE SERPL-MCNC: 107 MG/DL (ref 70–110)
HBA1C MFR BLD: 5.4 % (ref 4–5.6)
HCT VFR BLD AUTO: 40.1 % (ref 40–54)
HDLC SERPL-MCNC: 44 MG/DL (ref 40–75)
HDLC SERPL: 20.9 % (ref 20–50)
HGB BLD-MCNC: 12.8 G/DL (ref 14–18)
IMM GRANULOCYTES # BLD AUTO: 0.02 K/UL (ref 0–0.04)
IMM GRANULOCYTES NFR BLD AUTO: 0.3 % (ref 0–0.5)
IRON SERPL-MCNC: 42 UG/DL (ref 45–160)
LDLC SERPL CALC-MCNC: 147.4 MG/DL (ref 63–159)
LYMPHOCYTES # BLD AUTO: 1.2 K/UL (ref 1–4.8)
LYMPHOCYTES NFR BLD: 16 % (ref 18–48)
MCH RBC QN AUTO: 26.1 PG (ref 27–31)
MCHC RBC AUTO-ENTMCNC: 31.9 G/DL (ref 32–36)
MCV RBC AUTO: 82 FL (ref 82–98)
MONOCYTES # BLD AUTO: 0.6 K/UL (ref 0.3–1)
MONOCYTES NFR BLD: 8.1 % (ref 4–15)
NEUTROPHILS # BLD AUTO: 5.3 K/UL (ref 1.8–7.7)
NEUTROPHILS NFR BLD: 73 % (ref 38–73)
NONHDLC SERPL-MCNC: 167 MG/DL
NRBC BLD-RTO: 0 /100 WBC
PLATELET # BLD AUTO: 242 K/UL (ref 150–450)
PMV BLD AUTO: 11.7 FL (ref 9.2–12.9)
POTASSIUM SERPL-SCNC: 4.7 MMOL/L (ref 3.5–5.1)
PROT SERPL-MCNC: 7.7 G/DL (ref 6–8.4)
RBC # BLD AUTO: 4.9 M/UL (ref 4.6–6.2)
SATURATED IRON: 10 % (ref 20–50)
SODIUM SERPL-SCNC: 138 MMOL/L (ref 136–145)
TOTAL IRON BINDING CAPACITY: 435 UG/DL (ref 250–450)
TRANSFERRIN SERPL-MCNC: 294 MG/DL (ref 200–375)
TRIGL SERPL-MCNC: 98 MG/DL (ref 30–150)
TSH SERPL DL<=0.005 MIU/L-ACNC: 1.22 UIU/ML (ref 0.4–4)
VIT B12 SERPL-MCNC: 371 PG/ML (ref 210–950)
WBC # BLD AUTO: 7.26 K/UL (ref 3.9–12.7)

## 2024-12-12 PROCEDURE — 85025 COMPLETE CBC W/AUTO DIFF WBC: CPT | Performed by: INTERNAL MEDICINE

## 2024-12-12 PROCEDURE — 84443 ASSAY THYROID STIM HORMONE: CPT | Performed by: INTERNAL MEDICINE

## 2024-12-12 PROCEDURE — 84153 ASSAY OF PSA TOTAL: CPT | Performed by: INTERNAL MEDICINE

## 2024-12-12 PROCEDURE — 80053 COMPREHEN METABOLIC PANEL: CPT | Performed by: INTERNAL MEDICINE

## 2024-12-12 PROCEDURE — 80061 LIPID PANEL: CPT | Performed by: INTERNAL MEDICINE

## 2024-12-12 PROCEDURE — 82306 VITAMIN D 25 HYDROXY: CPT | Performed by: INTERNAL MEDICINE

## 2024-12-12 PROCEDURE — 83036 HEMOGLOBIN GLYCOSYLATED A1C: CPT | Performed by: INTERNAL MEDICINE

## 2024-12-12 PROCEDURE — 82607 VITAMIN B-12: CPT | Performed by: INTERNAL MEDICINE

## 2024-12-12 PROCEDURE — 84466 ASSAY OF TRANSFERRIN: CPT | Performed by: INTERNAL MEDICINE

## 2024-12-12 NOTE — PROGRESS NOTES
Subjective     Patient ID: Jerman Miguel is a 74 y.o. male.    Chief Complaint: Follow-up (Labs?) and Pre-op Exam (L cataract surgery 12/23 will bring paper work in after apt (in car))    Annual Wellness Visit and Health Risk Assessement (HRA)    Cognitive Impairment: None    Mental Conditions/Psychosocial Risks: None    Behavioral Risks (alcohol, tobacco, sedentary, malnutrition, etc.): See Social history    How often do you use opioids? Rarely    Functional Ability:    Steady gait: Yes  Self-reliant: Yes   Safe home: Yes  Hearing Difficulties: No   Vision Difficulties: yes    Physical Impairment: None    Functional assessment:  Able to do all ADL's (including dressing, feeding, toileting, grooming, bathing, and ambulating).  Fall Risk: Low  Energy level: Good  Mental well-being: Good    Living Will: See Patient Demographics  Information on how to obtain a [employer] form is included in the patient information which prints out under Patient instructions.  I did not directly discuss advanced directives/end of life care planning with the patient unless I have included a summary of the discussion in my assessment and plan.    HEIDS Measure screening dates:  BMI: See Vital signs      DEXA:               See Health Maintenance Report  colon screen:    See Health Maintenance Report      Mammogram:   See Health Maintenance Report                   Glaucoma screen:  per [facility]            A list of current providers and suppliers is in The Problem list under the relevant problem.      Vaccines: See Health Maintenance Report  Flu:            Pneumovax:  Shingrix:         The patient's current health status is: Good   Patient was educated on all medical problems. See A/P from note dated today.    A written Personal Prevention Plan (screening and health advice) was printed for the patient and is listed under Patient Instructions.    Screening: The patient was screened for depression with the PHQ2 questionnaire and possible  health consequences were discussed with the patient, who understands (15 minutes spent). The patient was screened for opioid use (the ANIL screen) by asking how often do you use opioids? The patient was screened for the misuse of alcohol, by asking the number of drinks per average week, and if pt has had more than 4 drinks (more than 3 for women and elderly) in 1 day within the past year. The health and legal consequences of misuse were discussed (15 minutes spent). The patient was screened for obesity (BMI>30), If the current BMI > 30, then the possible consequences of obesity, as well as the benefits of diet, exercise, and weight loss were discussed. Any behavioral risks were identified, and methods to achieve appropriate treatment goals were discussed (15 minutes spent).                                 Review of Systems   Constitutional: Negative.    Eyes:  Positive for visual disturbance.   Respiratory: Negative.     Cardiovascular: Negative.           Objective     Physical Exam  Vitals and nursing note reviewed.   HENT:      Head: Normocephalic and atraumatic.   Eyes:      Pupils: Pupils are equal, round, and reactive to light.   Neurological:      Mental Status: He is alert.            Assessment and Plan     1. Essential hypertension  Overview:  2014    Orders:  -     influenza (adjuvanted) (Fluad) 45 mcg/0.5 mL IM vaccine (> or = 66 yo) 0.5 mL    2. Dyslipidemia    3. Acute blood loss anemia    4. Aortic atherosclerosis    5. Encounter for general adult medical examination with abnormal findings    6. Cortical age-related cataract of left eye    7. Pre-op evaluation        Plan:  Per orders and D/C instructions.  Check routine labs.  Flu shot today.      Follow up in about 6 months (around 6/12/2025).

## 2024-12-12 NOTE — PROGRESS NOTES
Subjective     Patient ID: Jerman Miguel is a 74 y.o. male.    Chief Complaint: Follow-up (Labs?) and Pre-op Exam (L cataract surgery 12/23 will bring paper work in after apt (in car))    The patient presents today for an annual wellness exam and also has specific complaints and medical problems to be addressed and managed.     He is going to have left cataract surgery on December 23, 2024.  He will bring me the preop form later today.      Hypertension  This is a chronic problem. The current episode started more than 1 year ago. The problem is controlled.           Follow-up  This is a chronic problem. The current episode started more than 1 year ago. The problem has been unchanged.     Review of Systems   Constitutional: Negative.    Eyes:  Positive for visual disturbance.   Respiratory: Negative.     Cardiovascular: Negative.           Objective     Physical Exam  Vitals and nursing note reviewed.   Constitutional:       Appearance: He is well-developed.   HENT:      Head: Normocephalic and atraumatic.   Eyes:      Pupils: Pupils are equal, round, and reactive to light.   Cardiovascular:      Rate and Rhythm: Normal rate and regular rhythm.      Heart sounds: Murmur heard.      Crescendo decrescendo systolic murmur is present with a grade of 1/6.      Comments: Murmur at right upper sternal border  Pulmonary:      Effort: Pulmonary effort is normal.   Neurological:      Mental Status: He is alert.            Assessment and Plan     1. Essential hypertension  Overview:  2014    Orders:  -     influenza (adjuvanted) (Fluad) 45 mcg/0.5 mL IM vaccine (> or = 66 yo) 0.5 mL    2. Dyslipidemia    3. Acute blood loss anemia    4. Aortic atherosclerosis    5. Encounter for general adult medical examination with abnormal findings    6. Cortical age-related cataract of left eye    7. Pre-op evaluation        PLAN:  Per orders and D/C instructions.  Continue diet and/or meds for aortic atherosclerosis, history of  anemia, HTN, and high cholesterol, which are stable.  Check routine labs.  Flu shot today.  Preop form will be filled out and sent to ophthalmologist for left cataract surgery for which he is medically optimized.       Follow up in about 6 months (around 6/12/2025).

## 2024-12-13 RX ORDER — ROSUVASTATIN CALCIUM 5 MG/1
5 TABLET, COATED ORAL DAILY
Qty: 90 TABLET | Refills: 3 | Status: SHIPPED | OUTPATIENT
Start: 2024-12-13 | End: 2025-12-13

## 2025-01-23 RX ORDER — AMLODIPINE BESYLATE 5 MG/1
TABLET ORAL
Qty: 90 TABLET | Refills: 2 | Status: SHIPPED | OUTPATIENT
Start: 2025-01-23

## 2025-04-22 ENCOUNTER — OFFICE VISIT (OUTPATIENT)
Dept: INTERNAL MEDICINE | Facility: CLINIC | Age: 75
End: 2025-04-22
Attending: INTERNAL MEDICINE
Payer: MEDICARE

## 2025-04-22 VITALS
DIASTOLIC BLOOD PRESSURE: 80 MMHG | HEIGHT: 71 IN | WEIGHT: 193 LBS | SYSTOLIC BLOOD PRESSURE: 116 MMHG | HEART RATE: 82 BPM | OXYGEN SATURATION: 97 % | BODY MASS INDEX: 27.02 KG/M2

## 2025-04-22 DIAGNOSIS — E78.5 DYSLIPIDEMIA: ICD-10-CM

## 2025-04-22 DIAGNOSIS — R42 DIZZY SPELLS: ICD-10-CM

## 2025-04-22 DIAGNOSIS — I10 ESSENTIAL HYPERTENSION: Primary | ICD-10-CM

## 2025-04-22 DIAGNOSIS — M25.511 RIGHT SHOULDER PAIN, UNSPECIFIED CHRONICITY: ICD-10-CM

## 2025-04-22 DIAGNOSIS — R51.9 NONINTRACTABLE HEADACHE, UNSPECIFIED CHRONICITY PATTERN, UNSPECIFIED HEADACHE TYPE: ICD-10-CM

## 2025-04-22 DIAGNOSIS — Z13.39 SCREENING FOR ALCOHOLISM: ICD-10-CM

## 2025-04-22 DIAGNOSIS — Z13.31 SCREENING FOR DEPRESSION: ICD-10-CM

## 2025-04-22 RX ORDER — FLUTICASONE PROPIONATE 50 MCG
2 SPRAY, SUSPENSION (ML) NASAL DAILY
Qty: 16 G | Refills: 5 | Status: SHIPPED | OUTPATIENT
Start: 2025-04-22

## 2025-04-22 NOTE — PROGRESS NOTES
Subjective     Patient ID: Jreman Miguel is a 74 y.o. male.    Chief Complaint: Follow-up (Headaches, dizzy spells, pain in R shoulder with limited mobility with clicking inside the shoulder when moves )    For the past 3 weeks he sometimes wakes up with a mild headache.  It gets better after taking 1 aspirin.  He does feel sinus and nasal congestion in the morning.    Occasionally he has been getting dizzy lately.  It is not associated with standing or with turning his head.      Hypertension  This is a chronic problem. The current episode started more than 1 year ago. The problem is controlled.       His right shoulder hurts and pumps with specific movements.      Review of Systems   Constitutional: Negative.    HENT:  Positive for nasal congestion and sinus pressure/congestion.    Respiratory: Negative.     Cardiovascular: Negative.    Musculoskeletal:  Positive for arthralgias.   Neurological:  Positive for dizziness and headaches.          Objective     Physical Exam  Vitals and nursing note reviewed.   Constitutional:       Appearance: He is well-developed.   HENT:      Head: Normocephalic and atraumatic.   Eyes:      Extraocular Movements:      Right eye: Normal extraocular motion and no nystagmus.      Left eye: Normal extraocular motion and no nystagmus.      Pupils: Pupils are equal, round, and reactive to light.   Cardiovascular:      Rate and Rhythm: Normal rate and regular rhythm.      Heart sounds: Murmur heard.      Crescendo decrescendo systolic murmur is present with a grade of 1/6.      Comments: Murmur at right upper sternal border  Pulmonary:      Effort: Pulmonary effort is normal.   Musculoskeletal:      Right shoulder: Crepitus present. Decreased range of motion.   Neurological:      Mental Status: He is alert.            Assessment and Plan     1. Essential hypertension  Overview:  2014      2. Dyslipidemia  Overview:  12/24 [id number]=[id number] The 10-year ASCVD risk score (Murray RAMANA, et al.,  2019) is: 23.2%    Values used to calculate the score:      Age: 74 years      Sex: Male      Is Non- : Yes      Diabetic: No      Tobacco smoker: No      Systolic Blood Pressure: 128 mmHg      Is BP treated: Yes      HDL Cholesterol: 44 mg/dL      Total Cholesterol: 211 mg/dL       3. Nonintractable headache, unspecified chronicity pattern, unspecified headache type    4. Dizzy spells    5. Right shoulder pain, unspecified chronicity    Other orders  -     fluticasone propionate (FLONASE) 50 mcg/actuation nasal spray; 2 sprays (100 mcg total) by Each Nostril route once daily.  Dispense: 16 g; Refill: 5        PLAN:  Per orders and D/C instructions.  Continue diet and/or meds for HTN, and high cholesterol, which are stable.  He will do the pendulum stretch for right shoulder tendinitis.  He will use Flonase for headaches and dizziness.    Screening: The patient was screened for depression with the PHQ2 questionnaire and possible health consequences were discussed with the patient, who understands (15 minutes spent).       The patient was screened for the misuse of alcohol, by asking the number of drinks per average week, and if pt has had more than 4 drinks (more than 3 for women and elderly) in 1 day within the past year. The health and legal consequences of misuse were discussed (15 minutes spent).        Follow up in 8 weeks (on 6/19/2025).

## 2025-04-23 RX ORDER — LISINOPRIL 20 MG/1
20 TABLET ORAL
Qty: 90 TABLET | Refills: 3 | Status: SHIPPED | OUTPATIENT
Start: 2025-04-23

## 2025-05-30 ENCOUNTER — DOCUMENTATION ONLY (OUTPATIENT)
Dept: INTERNAL MEDICINE | Facility: CLINIC | Age: 75
End: 2025-05-30
Payer: MEDICARE

## 2025-05-30 DIAGNOSIS — B96.81 HELICOBACTER PYLORI GASTRITIS: ICD-10-CM

## 2025-05-30 DIAGNOSIS — D62 ACUTE BLOOD LOSS ANEMIA: ICD-10-CM

## 2025-05-30 DIAGNOSIS — I10 ESSENTIAL HYPERTENSION: Primary | ICD-10-CM

## 2025-05-30 DIAGNOSIS — Z78.9 KNOWN MEDICAL PROBLEMS: ICD-10-CM

## 2025-05-30 DIAGNOSIS — K29.70 HELICOBACTER PYLORI GASTRITIS: ICD-10-CM

## 2025-06-25 RX ORDER — LISINOPRIL 20 MG/1
20 TABLET ORAL DAILY
Qty: 90 TABLET | Refills: 3 | Status: SHIPPED | OUTPATIENT
Start: 2025-06-25

## 2025-08-06 ENCOUNTER — DOCUMENTATION ONLY (OUTPATIENT)
Dept: INTERNAL MEDICINE | Facility: CLINIC | Age: 75
End: 2025-08-06
Payer: MEDICARE

## 2025-08-06 DIAGNOSIS — Z78.9 KNOWN MEDICAL PROBLEMS: ICD-10-CM

## 2025-08-06 DIAGNOSIS — I10 ESSENTIAL HYPERTENSION: Primary | ICD-10-CM

## 2025-08-06 DIAGNOSIS — K52.9 COLITIS: ICD-10-CM

## 2025-08-06 DIAGNOSIS — H91.90 HEARING LOSS, UNSPECIFIED HEARING LOSS TYPE, UNSPECIFIED LATERALITY: ICD-10-CM

## 2025-08-06 PROCEDURE — G0557 PR ADVANCED PRIMARY CARE MGMT, LVL 2: HCPCS | Mod: S$GLB,,, | Performed by: INTERNAL MEDICINE

## 2025-08-06 NOTE — PROGRESS NOTES
Advanced primary care management (APCM) billing requirements have been met for this month.  Written consent was obtained and scanned in the electronic medical record.    My office is prepared and capable of delivering all the following APCM service elements to this Medicare beneficiary:     The patient is aware that only one practitioner can furnish and be paid for the service during a calendar month; that they have the right to stop the services at any time; and that cost sharing may apply.  Provide 24/7 access to care team/practitioner for urgent needs.  Provide continuity of care with the PCP, whom the patient can schedule successive routine appointments.  Deliver care in alternative ways to traditional office visits to best meet the patient's needs, such as Telemedecine visits.  There is overall comprehensive care management, systematic needs assessment (medical and psychosocial), and system-based approaches to ensure receipt of preventive services.    Perform medication management, reconciliation, and oversight of self-management.  Ensure timely follow-up communication with the patient and/or caregiver after an emergency department visit, discharge from hospital, skilled nursing facility, or other health care facility, within 7 calendar days of discharge.  Provide coordination of care transitions between and among health care facilities.  Ensure timely exchange of electronic health information with other practitioners and providers to support continuity of care.  Provide ongoing communication and coordination with other practitioners and other health care facilities, and document communication regarding the patient's psychosocial needs, functional deficits, goals, preferences, and desired outcomes, including cultural and linguistic factors, in the patient's medical record.  Provide enhanced opportunities for the patient or caregiver to communicate with the care team regarding the patient's care using  non-face-to-face consultation methods, such as secure messaging, patient portal, or other patient-initiated digital communications that require a clinical decision.  Analyze patient population data to identify gaps in care and offer interventions.  Risk stratify the practice population based on defined diagnoses, claims, or other electronic data to identify and target services to patients.  Be assessed through performance measurement of primary care quality, total cost of care, and meaningful use of Certified EHR Technology.  Development, implementation, revision, and maintenance of an electronic patient-centered comprehensive care plan.  The patient centered comprehensive care plan is listed under Patient Instructions for the 1st visit (or occasionally the 2nd visit) with me each calendar year. A copy is printed for the patient and it can be accessed at any time through the patient portal.  A typical comprehensive care plan includes the following recommendations:  1. Exercise  Exercise aerobically with a target heart rate of (220-age) x 0.8  Exercise 30-45 minutes on most days of the week  2. Diet and Supplements- All supplements can be obtained through a varied, healthy diet  Calcium: 1,000 - 1,200 mg each day  8 oz milk or Calcium fortified O.J. = 300, 8 oz Yogurt = 400 mg, 1 oz of cheese =100-200 mg              8 oz Oatmeal = 215 mg, 3 oz Whitney = 240 mg  Vitamin D: 800 iu each day- Can probably be obtained by 30 min. of direct sunlight    each day             3 oz. Whitney = 800 iu,  3 oz. Tuna =150 iu, Milk or fortified O.J. = 120 iu  Fish oil: 1-2 grams each day or about 840 mg of EPA and DHA (Omega-3 fatty acids) each day             3 oz. Whitney=2 grams,  3 oz. Tuna=1.3 grams,  3 oz. drained light Tuna= 0.25 grams  Folic acid 800 mcg each day for all women planning or capable of pregnancy  3. Lifestyle  Alcohol: 1 drink = 12 oz. domestic beer, 4 oz. wine, or 1 oz. hard (80 proof) liquor             Males:  </= 14 drinks per week with no more than 4 in any one day             Females: </= 7 drinks per week with no more than 3 in any one day  Salt: 1.2 - 3 grams of Sodium each day.  Tobacco: Don't smoke, or quit smoking (discuss with your doctor)  Depression: If you feel depressed discuss with your doctor  Weight: Maintain a healthy body weight. Stay within 10% of:             Males: 106 lbs. + 6 lbs per inch height above 5 feet             Females: 100 lbs + 5 lbs per inch height above 5 feet  4. Routine tests  Blood pressure check at each visit, or at least once each year  HIV screening (one time) if less than 65 years old  Hepatitis C screen (one time) if less than 80 years old  Cholesterol screening every 3 years starting at age 21  Glucose/Hemaglobin A1C check every 3 years starting at age 35.  TSH (thyroid screen) every 2 years starting at age 50  Colonoscopy at age 45, and repeat every 10 years until age 75. Consider screening until age 85. DNA stool test (Cologuard) every 3 years is an acceptable alternative.  Vision screen at age 65  Females:  Gyn exam with cervical HPV test every 3 years or Pap smear every three years starting at age 25                  Stop screening at age 65 if past 3 exams were normal                  No screening for women who have had a hysterectomy with removal of cervix  Mammogram every 1-2 years starting at age 40 until age 75  Consider continuing Mammograms every other year for those older than 75 with a life expectancy of more than 10 years  Bone density scan at about age 65  Males:  PSA screening annually at age 50, age 45 for  Americans, until age 75. Consider annual screening after age 75          5. Immunizations  Influenza vaccine every year in the fall, especially if >50 or with a chronic disease  Consider getting a Covid booster vaccine annually  Tetanus/Diphtheria/Pertussis (Tdap) vaccine once (after the age of 18), then Tetanus/Diphtheria (Td) or Tdap vaccine every  10 years  Shingles (Shingrix) vaccine after age 50 and get a 2nd dose after 2-6 months  RSV (respiratory syncytial virus) vaccine after age 60  Pneumonia vaccine (Prevnar-20) at age 65    6. Advanced Directive/End of life care planning  You should consider having a signed document which informs physicians and family of your end of life care wishes.  You can go to [employer]DNR/[place]. Under Step 1 click download AdobePDF and print.  This is the [place] physician order for scope of Treatment ([employer]) form.  You can also request a blank copy of the [employer] form from my office.  Bring a copy of the signed document to my office so we can scan it in your medical chart.